# Patient Record
Sex: FEMALE | URBAN - METROPOLITAN AREA
[De-identification: names, ages, dates, MRNs, and addresses within clinical notes are randomized per-mention and may not be internally consistent; named-entity substitution may affect disease eponyms.]

---

## 2019-04-15 ENCOUNTER — PREP FOR SURGERY (OUTPATIENT)
Dept: OTHER | Facility: HOSPITAL | Age: 74
End: 2019-04-15

## 2019-08-07 ENCOUNTER — LAB REQUISITION (OUTPATIENT)
Dept: LAB | Facility: HOSPITAL | Age: 74
End: 2019-08-07

## 2019-08-07 DIAGNOSIS — Z00.00 ROUTINE GENERAL MEDICAL EXAMINATION AT A HEALTH CARE FACILITY: ICD-10-CM

## 2019-08-07 LAB
ALBUMIN SERPL-MCNC: 3.3 G/DL (ref 3.5–5.2)
ALBUMIN/GLOB SERPL: 0.9 G/DL
ALP SERPL-CCNC: 187 U/L (ref 39–117)
ALT SERPL W P-5'-P-CCNC: 13 U/L (ref 1–33)
ANION GAP SERPL CALCULATED.3IONS-SCNC: 15 MMOL/L (ref 5–15)
AST SERPL-CCNC: 20 U/L (ref 1–32)
BACTERIA UR QL AUTO: ABNORMAL /HPF
BASOPHILS # BLD AUTO: 0.07 10*3/MM3 (ref 0–0.2)
BASOPHILS NFR BLD AUTO: 0.2 % (ref 0–1.5)
BILIRUB SERPL-MCNC: 0.2 MG/DL (ref 0.2–1.2)
BILIRUB UR QL STRIP: NEGATIVE
BUN BLD-MCNC: 22 MG/DL (ref 8–23)
BUN/CREAT SERPL: 27.5 (ref 7–25)
CALCIUM SPEC-SCNC: 8.9 MG/DL (ref 8.6–10.5)
CHLORIDE SERPL-SCNC: 94 MMOL/L (ref 98–107)
CLARITY UR: CLEAR
CO2 SERPL-SCNC: 24 MMOL/L (ref 22–29)
COLOR UR: YELLOW
CREAT BLD-MCNC: 0.8 MG/DL (ref 0.57–1)
DEPRECATED RDW RBC AUTO: 47.1 FL (ref 37–54)
EOSINOPHIL # BLD AUTO: 0.06 10*3/MM3 (ref 0–0.4)
EOSINOPHIL NFR BLD AUTO: 0.2 % (ref 0.3–6.2)
ERYTHROCYTE [DISTWIDTH] IN BLOOD BY AUTOMATED COUNT: 15.6 % (ref 12.3–15.4)
GFR SERPL CREATININE-BSD FRML MDRD: 71 ML/MIN/1.73
GFR SERPL CREATININE-BSD FRML MDRD: 85 ML/MIN/1.73
GLOBULIN UR ELPH-MCNC: 3.7 GM/DL
GLUCOSE BLD-MCNC: 92 MG/DL (ref 65–99)
GLUCOSE UR STRIP-MCNC: NEGATIVE MG/DL
HCT VFR BLD AUTO: 33.4 % (ref 34–46.6)
HGB BLD-MCNC: 9.8 G/DL (ref 12–15.9)
HGB UR QL STRIP.AUTO: ABNORMAL
HYALINE CASTS UR QL AUTO: ABNORMAL /LPF
IMM GRANULOCYTES # BLD AUTO: 0.92 10*3/MM3 (ref 0–0.05)
IMM GRANULOCYTES NFR BLD AUTO: 2.5 % (ref 0–0.5)
KETONES UR QL STRIP: NEGATIVE
LEUKOCYTE ESTERASE UR QL STRIP.AUTO: ABNORMAL
LYMPHOCYTES # BLD AUTO: 2.18 10*3/MM3 (ref 0.7–3.1)
LYMPHOCYTES NFR BLD AUTO: 5.8 % (ref 19.6–45.3)
MCH RBC QN AUTO: 24 PG (ref 26.6–33)
MCHC RBC AUTO-ENTMCNC: 29.3 G/DL (ref 31.5–35.7)
MCV RBC AUTO: 81.9 FL (ref 79–97)
MONOCYTES # BLD AUTO: 2.1 10*3/MM3 (ref 0.1–0.9)
MONOCYTES NFR BLD AUTO: 5.6 % (ref 5–12)
NEUTROPHILS # BLD AUTO: 32.22 10*3/MM3 (ref 1.7–7)
NEUTROPHILS NFR BLD AUTO: 85.7 % (ref 42.7–76)
NITRITE UR QL STRIP: NEGATIVE
NRBC BLD AUTO-RTO: 0 /100 WBC (ref 0–0.2)
PH UR STRIP.AUTO: <=5 [PH] (ref 5–8)
PLATELET # BLD AUTO: 633 10*3/MM3 (ref 140–450)
PMV BLD AUTO: 9.1 FL (ref 6–12)
POTASSIUM BLD-SCNC: 3.3 MMOL/L (ref 3.5–5.2)
PROT SERPL-MCNC: 7 G/DL (ref 6–8.5)
PROT UR QL STRIP: ABNORMAL
RBC # BLD AUTO: 4.08 10*6/MM3 (ref 3.77–5.28)
RBC # UR: ABNORMAL /HPF
REF LAB TEST METHOD: ABNORMAL
SODIUM BLD-SCNC: 133 MMOL/L (ref 136–145)
SP GR UR STRIP: 1.02 (ref 1–1.03)
SQUAMOUS #/AREA URNS HPF: ABNORMAL /HPF
UROBILINOGEN UR QL STRIP: ABNORMAL
WBC NRBC COR # BLD: 37.55 10*3/MM3 (ref 3.4–10.8)
WBC UR QL AUTO: ABNORMAL /HPF
YEAST URNS QL MICRO: ABNORMAL /HPF

## 2019-08-07 PROCEDURE — 81001 URINALYSIS AUTO W/SCOPE: CPT

## 2019-08-07 PROCEDURE — 80053 COMPREHEN METABOLIC PANEL: CPT

## 2019-08-07 PROCEDURE — 85025 COMPLETE CBC W/AUTO DIFF WBC: CPT

## 2024-02-02 ENCOUNTER — HOSPITAL ENCOUNTER (OUTPATIENT)
Facility: HOSPITAL | Age: 79
Setting detail: OBSERVATION
Discharge: NURSING, REHAB OR LONG-TERM CARE | End: 2024-02-03
Attending: EMERGENCY MEDICINE | Admitting: INTERNAL MEDICINE
Payer: MEDICARE

## 2024-02-02 ENCOUNTER — APPOINTMENT (OUTPATIENT)
Dept: GENERAL RADIOLOGY | Facility: HOSPITAL | Age: 79
End: 2024-02-02
Payer: MEDICARE

## 2024-02-02 DIAGNOSIS — J44.1 COPD EXACERBATION: Primary | ICD-10-CM

## 2024-02-02 PROBLEM — K21.9 GERD WITHOUT ESOPHAGITIS: Status: ACTIVE | Noted: 2024-02-02

## 2024-02-02 PROBLEM — E83.51 HYPOCALCEMIA: Status: ACTIVE | Noted: 2024-02-02

## 2024-02-02 PROBLEM — E03.9 HYPOTHYROIDISM: Status: ACTIVE | Noted: 2024-02-02

## 2024-02-02 PROBLEM — I10 ESSENTIAL HYPERTENSION: Status: ACTIVE | Noted: 2024-02-02

## 2024-02-02 PROBLEM — J44.9 COPD (CHRONIC OBSTRUCTIVE PULMONARY DISEASE): Status: ACTIVE | Noted: 2024-02-02

## 2024-02-02 PROBLEM — D64.9 ANEMIA: Status: ACTIVE | Noted: 2024-02-02

## 2024-02-02 LAB
ALBUMIN SERPL-MCNC: 3.5 G/DL (ref 3.5–5.2)
ALBUMIN/GLOB SERPL: 1.1 G/DL
ALP SERPL-CCNC: 116 U/L (ref 39–117)
ALT SERPL W P-5'-P-CCNC: 11 U/L (ref 1–33)
ANION GAP SERPL CALCULATED.3IONS-SCNC: 8 MMOL/L (ref 5–15)
AST SERPL-CCNC: 24 U/L (ref 1–32)
BASOPHILS # BLD AUTO: 0.05 10*3/MM3 (ref 0–0.2)
BASOPHILS NFR BLD AUTO: 0.5 % (ref 0–1.5)
BILIRUB SERPL-MCNC: <0.2 MG/DL (ref 0–1.2)
BUN SERPL-MCNC: 19 MG/DL (ref 8–23)
BUN/CREAT SERPL: 26 (ref 7–25)
CALCIUM SPEC-SCNC: 8.5 MG/DL (ref 8.6–10.5)
CHLORIDE SERPL-SCNC: 101 MMOL/L (ref 98–107)
CO2 SERPL-SCNC: 33 MMOL/L (ref 22–29)
CREAT SERPL-MCNC: 0.73 MG/DL (ref 0.57–1)
D-LACTATE SERPL-SCNC: 0.8 MMOL/L (ref 0.5–2)
DEPRECATED RDW RBC AUTO: 47.7 FL (ref 37–54)
EGFRCR SERPLBLD CKD-EPI 2021: 84.8 ML/MIN/1.73
EOSINOPHIL # BLD AUTO: 0.48 10*3/MM3 (ref 0–0.4)
EOSINOPHIL NFR BLD AUTO: 4.5 % (ref 0.3–6.2)
ERYTHROCYTE [DISTWIDTH] IN BLOOD BY AUTOMATED COUNT: 12.9 % (ref 12.3–15.4)
FLUAV SUBTYP SPEC NAA+PROBE: NOT DETECTED
FLUBV RNA ISLT QL NAA+PROBE: NOT DETECTED
GLOBULIN UR ELPH-MCNC: 3.2 GM/DL
GLUCOSE SERPL-MCNC: 81 MG/DL (ref 65–99)
HCT VFR BLD AUTO: 33.6 % (ref 34–46.6)
HGB BLD-MCNC: 10 G/DL (ref 12–15.9)
HOLD SPECIMEN: NORMAL
IMM GRANULOCYTES # BLD AUTO: 0.05 10*3/MM3 (ref 0–0.05)
IMM GRANULOCYTES NFR BLD AUTO: 0.5 % (ref 0–0.5)
LYMPHOCYTES # BLD AUTO: 2.3 10*3/MM3 (ref 0.7–3.1)
LYMPHOCYTES NFR BLD AUTO: 21.6 % (ref 19.6–45.3)
MCH RBC QN AUTO: 29.9 PG (ref 26.6–33)
MCHC RBC AUTO-ENTMCNC: 29.8 G/DL (ref 31.5–35.7)
MCV RBC AUTO: 100.6 FL (ref 79–97)
MONOCYTES # BLD AUTO: 1.07 10*3/MM3 (ref 0.1–0.9)
MONOCYTES NFR BLD AUTO: 10 % (ref 5–12)
NEUTROPHILS NFR BLD AUTO: 6.71 10*3/MM3 (ref 1.7–7)
NEUTROPHILS NFR BLD AUTO: 62.9 % (ref 42.7–76)
NRBC BLD AUTO-RTO: 0 /100 WBC (ref 0–0.2)
NT-PROBNP SERPL-MCNC: 258.7 PG/ML (ref 0–1800)
PLATELET # BLD AUTO: 367 10*3/MM3 (ref 140–450)
PMV BLD AUTO: 9.4 FL (ref 6–12)
POTASSIUM SERPL-SCNC: 4.6 MMOL/L (ref 3.5–5.2)
PROT SERPL-MCNC: 6.7 G/DL (ref 6–8.5)
RBC # BLD AUTO: 3.34 10*6/MM3 (ref 3.77–5.28)
SARS-COV-2 RNA RESP QL NAA+PROBE: NOT DETECTED
SODIUM SERPL-SCNC: 142 MMOL/L (ref 136–145)
TROPONIN T SERPL HS-MCNC: 10 NG/L
WBC NRBC COR # BLD AUTO: 10.66 10*3/MM3 (ref 3.4–10.8)
WHOLE BLOOD HOLD COAG: NORMAL
WHOLE BLOOD HOLD SPECIMEN: NORMAL

## 2024-02-02 PROCEDURE — 94664 DEMO&/EVAL PT USE INHALER: CPT

## 2024-02-02 PROCEDURE — G0378 HOSPITAL OBSERVATION PER HR: HCPCS

## 2024-02-02 PROCEDURE — 93005 ELECTROCARDIOGRAM TRACING: CPT | Performed by: EMERGENCY MEDICINE

## 2024-02-02 PROCEDURE — 85045 AUTOMATED RETICULOCYTE COUNT: CPT | Performed by: PHYSICIAN ASSISTANT

## 2024-02-02 PROCEDURE — 94640 AIRWAY INHALATION TREATMENT: CPT

## 2024-02-02 PROCEDURE — 82607 VITAMIN B-12: CPT | Performed by: PHYSICIAN ASSISTANT

## 2024-02-02 PROCEDURE — 25810000003 SODIUM CHLORIDE 0.9 % SOLUTION 250 ML FLEX CONT

## 2024-02-02 PROCEDURE — 36415 COLL VENOUS BLD VENIPUNCTURE: CPT

## 2024-02-02 PROCEDURE — 85025 COMPLETE CBC W/AUTO DIFF WBC: CPT | Performed by: EMERGENCY MEDICINE

## 2024-02-02 PROCEDURE — 83880 ASSAY OF NATRIURETIC PEPTIDE: CPT | Performed by: EMERGENCY MEDICINE

## 2024-02-02 PROCEDURE — 84439 ASSAY OF FREE THYROXINE: CPT | Performed by: PHYSICIAN ASSISTANT

## 2024-02-02 PROCEDURE — 85025 COMPLETE CBC W/AUTO DIFF WBC: CPT | Performed by: PHYSICIAN ASSISTANT

## 2024-02-02 PROCEDURE — 84466 ASSAY OF TRANSFERRIN: CPT | Performed by: PHYSICIAN ASSISTANT

## 2024-02-02 PROCEDURE — 71045 X-RAY EXAM CHEST 1 VIEW: CPT

## 2024-02-02 PROCEDURE — 25010000002 METHYLPREDNISOLONE PER 125 MG

## 2024-02-02 PROCEDURE — 84443 ASSAY THYROID STIM HORMONE: CPT | Performed by: PHYSICIAN ASSISTANT

## 2024-02-02 PROCEDURE — 84484 ASSAY OF TROPONIN QUANT: CPT | Performed by: EMERGENCY MEDICINE

## 2024-02-02 PROCEDURE — 82330 ASSAY OF CALCIUM: CPT | Performed by: PHYSICIAN ASSISTANT

## 2024-02-02 PROCEDURE — 96374 THER/PROPH/DIAG INJ IV PUSH: CPT

## 2024-02-02 PROCEDURE — 99285 EMERGENCY DEPT VISIT HI MDM: CPT

## 2024-02-02 PROCEDURE — 94799 UNLISTED PULMONARY SVC/PX: CPT

## 2024-02-02 PROCEDURE — 82746 ASSAY OF FOLIC ACID SERUM: CPT | Performed by: PHYSICIAN ASSISTANT

## 2024-02-02 PROCEDURE — 99222 1ST HOSP IP/OBS MODERATE 55: CPT | Performed by: PHYSICIAN ASSISTANT

## 2024-02-02 PROCEDURE — 83605 ASSAY OF LACTIC ACID: CPT

## 2024-02-02 PROCEDURE — 82728 ASSAY OF FERRITIN: CPT | Performed by: PHYSICIAN ASSISTANT

## 2024-02-02 PROCEDURE — 80053 COMPREHEN METABOLIC PANEL: CPT | Performed by: EMERGENCY MEDICINE

## 2024-02-02 PROCEDURE — 83540 ASSAY OF IRON: CPT | Performed by: PHYSICIAN ASSISTANT

## 2024-02-02 PROCEDURE — 93005 ELECTROCARDIOGRAM TRACING: CPT

## 2024-02-02 PROCEDURE — 87636 SARSCOV2 & INF A&B AMP PRB: CPT

## 2024-02-02 PROCEDURE — 87040 BLOOD CULTURE FOR BACTERIA: CPT

## 2024-02-02 RX ORDER — ALBUTEROL SULFATE 90 UG/1
2 AEROSOL, METERED RESPIRATORY (INHALATION) EVERY 6 HOURS PRN
COMMUNITY

## 2024-02-02 RX ORDER — METHYLPREDNISOLONE SODIUM SUCCINATE 125 MG/2ML
125 INJECTION, POWDER, LYOPHILIZED, FOR SOLUTION INTRAMUSCULAR; INTRAVENOUS ONCE
Status: COMPLETED | OUTPATIENT
Start: 2024-02-02 | End: 2024-02-02

## 2024-02-02 RX ORDER — CHOLECALCIFEROL (VITAMIN D3) 125 MCG
5 CAPSULE ORAL NIGHTLY PRN
Status: DISCONTINUED | OUTPATIENT
Start: 2024-02-02 | End: 2024-02-03 | Stop reason: HOSPADM

## 2024-02-02 RX ORDER — LEVOTHYROXINE SODIUM 0.03 MG/1
25 TABLET ORAL
Status: DISCONTINUED | OUTPATIENT
Start: 2024-02-03 | End: 2024-02-03 | Stop reason: HOSPADM

## 2024-02-02 RX ORDER — ONDANSETRON 2 MG/ML
4 INJECTION INTRAMUSCULAR; INTRAVENOUS EVERY 6 HOURS PRN
Status: DISCONTINUED | OUTPATIENT
Start: 2024-02-02 | End: 2024-02-03 | Stop reason: HOSPADM

## 2024-02-02 RX ORDER — PANTOPRAZOLE SODIUM 40 MG/1
40 TABLET, DELAYED RELEASE ORAL DAILY
Status: DISCONTINUED | OUTPATIENT
Start: 2024-02-03 | End: 2024-02-03 | Stop reason: HOSPADM

## 2024-02-02 RX ORDER — ACETAMINOPHEN 325 MG/1
650 TABLET ORAL 2 TIMES DAILY
COMMUNITY

## 2024-02-02 RX ORDER — LANOLIN ALCOHOL/MO/W.PET/CERES
6 CREAM (GRAM) TOPICAL NIGHTLY
COMMUNITY

## 2024-02-02 RX ORDER — BACLOFEN 10 MG/1
10 TABLET ORAL 3 TIMES DAILY PRN
COMMUNITY

## 2024-02-02 RX ORDER — CARVEDILOL 12.5 MG/1
12.5 TABLET ORAL 2 TIMES DAILY WITH MEALS
COMMUNITY

## 2024-02-02 RX ORDER — SODIUM CHLORIDE 0.9 % (FLUSH) 0.9 %
10 SYRINGE (ML) INJECTION AS NEEDED
Status: DISCONTINUED | OUTPATIENT
Start: 2024-02-02 | End: 2024-02-03 | Stop reason: HOSPADM

## 2024-02-02 RX ORDER — ALPRAZOLAM 0.25 MG/1
0.25 TABLET ORAL 2 TIMES DAILY
COMMUNITY

## 2024-02-02 RX ORDER — ONDANSETRON 4 MG/1
4 TABLET, ORALLY DISINTEGRATING ORAL EVERY 6 HOURS PRN
Status: DISCONTINUED | OUTPATIENT
Start: 2024-02-02 | End: 2024-02-03 | Stop reason: HOSPADM

## 2024-02-02 RX ORDER — MIRTAZAPINE 15 MG/1
7.5 TABLET, FILM COATED ORAL NIGHTLY
COMMUNITY

## 2024-02-02 RX ORDER — VITAMIN E 268 MG
400 CAPSULE ORAL DAILY
COMMUNITY

## 2024-02-02 RX ORDER — FLUTICASONE PROPIONATE 50 MCG
2 SPRAY, SUSPENSION (ML) NASAL DAILY
COMMUNITY

## 2024-02-02 RX ORDER — IPRATROPIUM BROMIDE AND ALBUTEROL SULFATE 2.5; .5 MG/3ML; MG/3ML
3 SOLUTION RESPIRATORY (INHALATION)
Status: DISCONTINUED | OUTPATIENT
Start: 2024-02-02 | End: 2024-02-03 | Stop reason: HOSPADM

## 2024-02-02 RX ORDER — MELATONIN
2000 DAILY
COMMUNITY

## 2024-02-02 RX ORDER — SODIUM CHLORIDE 9 MG/ML
75 INJECTION, SOLUTION INTRAVENOUS CONTINUOUS
Status: DISCONTINUED | OUTPATIENT
Start: 2024-02-03 | End: 2024-02-03

## 2024-02-02 RX ORDER — SODIUM CHLORIDE 0.9 % (FLUSH) 0.9 %
10 SYRINGE (ML) INJECTION EVERY 12 HOURS SCHEDULED
Status: DISCONTINUED | OUTPATIENT
Start: 2024-02-03 | End: 2024-02-03 | Stop reason: HOSPADM

## 2024-02-02 RX ORDER — ASPIRIN 81 MG/1
81 TABLET ORAL DAILY
COMMUNITY

## 2024-02-02 RX ORDER — BUSPIRONE HYDROCHLORIDE 5 MG/1
7.5 TABLET ORAL 2 TIMES DAILY
Status: DISCONTINUED | OUTPATIENT
Start: 2024-02-03 | End: 2024-02-03

## 2024-02-02 RX ORDER — PANTOPRAZOLE SODIUM 40 MG/1
40 TABLET, DELAYED RELEASE ORAL DAILY
COMMUNITY

## 2024-02-02 RX ORDER — GABAPENTIN 300 MG/1
300 CAPSULE ORAL 3 TIMES DAILY
COMMUNITY

## 2024-02-02 RX ORDER — MAGNESIUM OXIDE 400 MG/1
400 TABLET ORAL DAILY
COMMUNITY

## 2024-02-02 RX ORDER — SERTRALINE HYDROCHLORIDE 100 MG/1
100 TABLET, FILM COATED ORAL DAILY
Status: DISCONTINUED | OUTPATIENT
Start: 2024-02-03 | End: 2024-02-03 | Stop reason: HOSPADM

## 2024-02-02 RX ORDER — ACETAMINOPHEN 325 MG/1
650 TABLET ORAL EVERY 4 HOURS PRN
Status: DISCONTINUED | OUTPATIENT
Start: 2024-02-02 | End: 2024-02-03 | Stop reason: HOSPADM

## 2024-02-02 RX ORDER — MIRTAZAPINE 15 MG/1
7.5 TABLET, FILM COATED ORAL NIGHTLY
Status: DISCONTINUED | OUTPATIENT
Start: 2024-02-03 | End: 2024-02-03 | Stop reason: HOSPADM

## 2024-02-02 RX ORDER — ASPIRIN 81 MG/1
81 TABLET ORAL DAILY
Status: DISCONTINUED | OUTPATIENT
Start: 2024-02-03 | End: 2024-02-03 | Stop reason: HOSPADM

## 2024-02-02 RX ORDER — IPRATROPIUM BROMIDE AND ALBUTEROL SULFATE 2.5; .5 MG/3ML; MG/3ML
3 SOLUTION RESPIRATORY (INHALATION) 3 TIMES DAILY PRN
COMMUNITY

## 2024-02-02 RX ORDER — TRAMADOL HYDROCHLORIDE 50 MG/1
50 TABLET ORAL EVERY 6 HOURS PRN
COMMUNITY

## 2024-02-02 RX ORDER — BUSPIRONE HYDROCHLORIDE 5 MG/1
7.5 TABLET ORAL 2 TIMES DAILY
Status: ON HOLD | COMMUNITY
End: 2024-02-03 | Stop reason: SDUPTHER

## 2024-02-02 RX ORDER — SODIUM CHLORIDE 9 MG/ML
40 INJECTION, SOLUTION INTRAVENOUS AS NEEDED
Status: DISCONTINUED | OUTPATIENT
Start: 2024-02-02 | End: 2024-02-03 | Stop reason: HOSPADM

## 2024-02-02 RX ORDER — CARVEDILOL 12.5 MG/1
12.5 TABLET ORAL 2 TIMES DAILY WITH MEALS
Status: DISCONTINUED | OUTPATIENT
Start: 2024-02-03 | End: 2024-02-03 | Stop reason: HOSPADM

## 2024-02-02 RX ORDER — FLUTICASONE FUROATE AND VILANTEROL 200; 25 UG/1; UG/1
1 POWDER RESPIRATORY (INHALATION)
COMMUNITY

## 2024-02-02 RX ORDER — LOPERAMIDE HYDROCHLORIDE 2 MG/1
2 CAPSULE ORAL 4 TIMES DAILY PRN
COMMUNITY

## 2024-02-02 RX ORDER — IPRATROPIUM BROMIDE AND ALBUTEROL SULFATE 2.5; .5 MG/3ML; MG/3ML
3 SOLUTION RESPIRATORY (INHALATION) ONCE
Status: COMPLETED | OUTPATIENT
Start: 2024-02-02 | End: 2024-02-02

## 2024-02-02 RX ORDER — SERTRALINE HYDROCHLORIDE 100 MG/1
100 TABLET, FILM COATED ORAL DAILY
COMMUNITY

## 2024-02-02 RX ORDER — ONDANSETRON 4 MG/1
4 TABLET, FILM COATED ORAL 4 TIMES DAILY PRN
COMMUNITY

## 2024-02-02 RX ORDER — DEXTRAN 70, GLYCERIN, HYPROMELLOSE 1; 2; 3 MG/ML; MG/ML; MG/ML
2 SOLUTION/ DROPS OPHTHALMIC 3 TIMES DAILY
COMMUNITY

## 2024-02-02 RX ORDER — LEVOTHYROXINE SODIUM 0.03 MG/1
25 TABLET ORAL
COMMUNITY

## 2024-02-02 RX ADMIN — IPRATROPIUM BROMIDE AND ALBUTEROL SULFATE 3 ML: .5; 2.5 SOLUTION RESPIRATORY (INHALATION) at 21:08

## 2024-02-02 RX ADMIN — METHYLPREDNISOLONE SODIUM SUCCINATE 125 MG: 125 INJECTION INTRAMUSCULAR; INTRAVENOUS at 21:31

## 2024-02-02 RX ADMIN — DOXYCYCLINE 100 MG: 100 INJECTION, POWDER, LYOPHILIZED, FOR SOLUTION INTRAVENOUS at 21:32

## 2024-02-02 NOTE — Clinical Note
Level of Care: Telemetry [5]   Diagnosis: COPD (chronic obstructive pulmonary disease) [649381]   Admitting Physician: PÉREZ GALEANO [[id number]]   Attending Physician: PÉREZ GALEANO [[id number]]

## 2024-02-03 VITALS
BODY MASS INDEX: 18.25 KG/M2 | OXYGEN SATURATION: 94 % | TEMPERATURE: 98.1 F | HEIGHT: 63 IN | SYSTOLIC BLOOD PRESSURE: 136 MMHG | HEART RATE: 93 BPM | WEIGHT: 103 LBS | RESPIRATION RATE: 16 BRPM | DIASTOLIC BLOOD PRESSURE: 87 MMHG

## 2024-02-03 PROBLEM — F41.0 PANIC ATTACK: Status: ACTIVE | Noted: 2024-02-03

## 2024-02-03 PROBLEM — R31.9 HEMATURIA: Status: ACTIVE | Noted: 2024-02-03

## 2024-02-03 LAB
ANION GAP SERPL CALCULATED.3IONS-SCNC: 9 MMOL/L (ref 5–15)
ANION GAP SERPL CALCULATED.3IONS-SCNC: 9 MMOL/L (ref 5–15)
BACTERIA UR QL AUTO: ABNORMAL /HPF
BASOPHILS # BLD AUTO: 0.01 10*3/MM3 (ref 0–0.2)
BASOPHILS # BLD AUTO: 0.03 10*3/MM3 (ref 0–0.2)
BASOPHILS NFR BLD AUTO: 0.1 % (ref 0–1.5)
BASOPHILS NFR BLD AUTO: 0.4 % (ref 0–1.5)
BILIRUB UR QL STRIP: NEGATIVE
BUN SERPL-MCNC: 16 MG/DL (ref 8–23)
BUN SERPL-MCNC: 19 MG/DL (ref 8–23)
BUN/CREAT SERPL: 26.2 (ref 7–25)
BUN/CREAT SERPL: 30.2 (ref 7–25)
CA-I SERPL ISE-MCNC: 1.22 MMOL/L (ref 1.12–1.32)
CALCIUM SPEC-SCNC: 8.6 MG/DL (ref 8.6–10.5)
CALCIUM SPEC-SCNC: 8.9 MG/DL (ref 8.6–10.5)
CHLORIDE SERPL-SCNC: 102 MMOL/L (ref 98–107)
CHLORIDE SERPL-SCNC: 103 MMOL/L (ref 98–107)
CLARITY UR: ABNORMAL
CO2 SERPL-SCNC: 29 MMOL/L (ref 22–29)
CO2 SERPL-SCNC: 30 MMOL/L (ref 22–29)
COLOR UR: YELLOW
CREAT SERPL-MCNC: 0.61 MG/DL (ref 0.57–1)
CREAT SERPL-MCNC: 0.63 MG/DL (ref 0.57–1)
DEPRECATED RDW RBC AUTO: 45.5 FL (ref 37–54)
DEPRECATED RDW RBC AUTO: 46.5 FL (ref 37–54)
EGFRCR SERPLBLD CKD-EPI 2021: 91.5 ML/MIN/1.73
EGFRCR SERPLBLD CKD-EPI 2021: 92.2 ML/MIN/1.73
EOSINOPHIL # BLD AUTO: 0 10*3/MM3 (ref 0–0.4)
EOSINOPHIL # BLD AUTO: 0.18 10*3/MM3 (ref 0–0.4)
EOSINOPHIL NFR BLD AUTO: 0 % (ref 0.3–6.2)
EOSINOPHIL NFR BLD AUTO: 2.2 % (ref 0.3–6.2)
ERYTHROCYTE [DISTWIDTH] IN BLOOD BY AUTOMATED COUNT: 12.9 % (ref 12.3–15.4)
ERYTHROCYTE [DISTWIDTH] IN BLOOD BY AUTOMATED COUNT: 13.1 % (ref 12.3–15.4)
FERRITIN SERPL-MCNC: 35.95 NG/ML (ref 13–150)
FOLATE SERPL-MCNC: 3.52 NG/ML (ref 4.78–24.2)
GLUCOSE SERPL-MCNC: 141 MG/DL (ref 65–99)
GLUCOSE SERPL-MCNC: 93 MG/DL (ref 65–99)
GLUCOSE UR STRIP-MCNC: NEGATIVE MG/DL
HBV SURFACE AG SERPL QL IA: NORMAL
HCT VFR BLD AUTO: 32.4 % (ref 34–46.6)
HCT VFR BLD AUTO: 33.4 % (ref 34–46.6)
HCV AB SER DONR QL: NORMAL
HGB BLD-MCNC: 10.3 G/DL (ref 12–15.9)
HGB BLD-MCNC: 9.9 G/DL (ref 12–15.9)
HGB UR QL STRIP.AUTO: ABNORMAL
HIV 1+2 AB+HIV1 P24 AG SERPL QL IA: NORMAL
HYALINE CASTS UR QL AUTO: ABNORMAL /LPF
IMM GRANULOCYTES # BLD AUTO: 0.02 10*3/MM3 (ref 0–0.05)
IMM GRANULOCYTES # BLD AUTO: 0.06 10*3/MM3 (ref 0–0.05)
IMM GRANULOCYTES NFR BLD AUTO: 0.2 % (ref 0–0.5)
IMM GRANULOCYTES NFR BLD AUTO: 0.7 % (ref 0–0.5)
IRON 24H UR-MRATE: 19 MCG/DL (ref 37–145)
IRON SATN MFR SERPL: 6 % (ref 20–50)
KETONES UR QL STRIP: ABNORMAL
LEUKOCYTE ESTERASE UR QL STRIP.AUTO: ABNORMAL
LYMPHOCYTES # BLD AUTO: 1.27 10*3/MM3 (ref 0.7–3.1)
LYMPHOCYTES # BLD AUTO: 1.63 10*3/MM3 (ref 0.7–3.1)
LYMPHOCYTES NFR BLD AUTO: 15.3 % (ref 19.6–45.3)
LYMPHOCYTES NFR BLD AUTO: 20 % (ref 19.6–45.3)
MCH RBC QN AUTO: 30.1 PG (ref 26.6–33)
MCH RBC QN AUTO: 30.3 PG (ref 26.6–33)
MCHC RBC AUTO-ENTMCNC: 30.6 G/DL (ref 31.5–35.7)
MCHC RBC AUTO-ENTMCNC: 30.8 G/DL (ref 31.5–35.7)
MCV RBC AUTO: 97.7 FL (ref 79–97)
MCV RBC AUTO: 99.1 FL (ref 79–97)
MONOCYTES # BLD AUTO: 0.04 10*3/MM3 (ref 0.1–0.9)
MONOCYTES # BLD AUTO: 0.25 10*3/MM3 (ref 0.1–0.9)
MONOCYTES NFR BLD AUTO: 0.5 % (ref 5–12)
MONOCYTES NFR BLD AUTO: 3.1 % (ref 5–12)
NEUTROPHILS NFR BLD AUTO: 6.06 10*3/MM3 (ref 1.7–7)
NEUTROPHILS NFR BLD AUTO: 6.91 10*3/MM3 (ref 1.7–7)
NEUTROPHILS NFR BLD AUTO: 74.1 % (ref 42.7–76)
NEUTROPHILS NFR BLD AUTO: 83.4 % (ref 42.7–76)
NITRITE UR QL STRIP: NEGATIVE
NRBC BLD AUTO-RTO: 0 /100 WBC (ref 0–0.2)
NRBC BLD AUTO-RTO: 0 /100 WBC (ref 0–0.2)
PH UR STRIP.AUTO: 7 [PH] (ref 5–8)
PLATELET # BLD AUTO: 319 10*3/MM3 (ref 140–450)
PLATELET # BLD AUTO: 332 10*3/MM3 (ref 140–450)
PMV BLD AUTO: 9.3 FL (ref 6–12)
PMV BLD AUTO: 9.4 FL (ref 6–12)
POTASSIUM SERPL-SCNC: 4.2 MMOL/L (ref 3.5–5.2)
POTASSIUM SERPL-SCNC: 4.2 MMOL/L (ref 3.5–5.2)
PROT UR QL STRIP: ABNORMAL
QT INTERVAL: 392 MS
QTC INTERVAL: 435 MS
RBC # BLD AUTO: 3.27 10*6/MM3 (ref 3.77–5.28)
RBC # BLD AUTO: 3.42 10*6/MM3 (ref 3.77–5.28)
RBC # UR STRIP: ABNORMAL /HPF
REF LAB TEST METHOD: ABNORMAL
RETICS # AUTO: 0.03 10*6/MM3 (ref 0.02–0.13)
RETICS/RBC NFR AUTO: 1.02 % (ref 0.7–1.9)
SODIUM SERPL-SCNC: 141 MMOL/L (ref 136–145)
SODIUM SERPL-SCNC: 141 MMOL/L (ref 136–145)
SP GR UR STRIP: 1.01 (ref 1–1.03)
SQUAMOUS #/AREA URNS HPF: ABNORMAL /HPF
T4 FREE SERPL-MCNC: 1.36 NG/DL (ref 0.93–1.7)
TIBC SERPL-MCNC: 308 MCG/DL (ref 298–536)
TRANSFERRIN SERPL-MCNC: 207 MG/DL (ref 200–360)
TSH SERPL DL<=0.05 MIU/L-ACNC: 0.79 UIU/ML (ref 0.27–4.2)
UROBILINOGEN UR QL STRIP: ABNORMAL
VIT B12 BLD-MCNC: 314 PG/ML (ref 211–946)
WBC # UR STRIP: ABNORMAL /HPF
WBC NRBC COR # BLD AUTO: 8.17 10*3/MM3 (ref 3.4–10.8)
WBC NRBC COR # BLD AUTO: 8.29 10*3/MM3 (ref 3.4–10.8)

## 2024-02-03 PROCEDURE — G0378 HOSPITAL OBSERVATION PER HR: HCPCS

## 2024-02-03 PROCEDURE — 63710000001 PREDNISONE PER 1 MG: Performed by: INTERNAL MEDICINE

## 2024-02-03 PROCEDURE — 80048 BASIC METABOLIC PNL TOTAL CA: CPT | Performed by: INTERNAL MEDICINE

## 2024-02-03 PROCEDURE — 94799 UNLISTED PULMONARY SVC/PX: CPT

## 2024-02-03 PROCEDURE — 94664 DEMO&/EVAL PT USE INHALER: CPT

## 2024-02-03 PROCEDURE — 96361 HYDRATE IV INFUSION ADD-ON: CPT

## 2024-02-03 PROCEDURE — 25810000003 SODIUM CHLORIDE 0.9 % SOLUTION: Performed by: PHYSICIAN ASSISTANT

## 2024-02-03 PROCEDURE — 81001 URINALYSIS AUTO W/SCOPE: CPT | Performed by: INTERNAL MEDICINE

## 2024-02-03 PROCEDURE — 94761 N-INVAS EAR/PLS OXIMETRY MLT: CPT

## 2024-02-03 PROCEDURE — 87086 URINE CULTURE/COLONY COUNT: CPT | Performed by: INTERNAL MEDICINE

## 2024-02-03 PROCEDURE — 99239 HOSP IP/OBS DSCHRG MGMT >30: CPT | Performed by: INTERNAL MEDICINE

## 2024-02-03 PROCEDURE — 85025 COMPLETE CBC W/AUTO DIFF WBC: CPT | Performed by: INTERNAL MEDICINE

## 2024-02-03 RX ORDER — BUSPIRONE HYDROCHLORIDE 5 MG/1
10 TABLET ORAL 2 TIMES DAILY
Qty: 60 TABLET | Refills: 0 | Status: SHIPPED | OUTPATIENT
Start: 2024-02-03 | End: 2024-02-03 | Stop reason: SDUPTHER

## 2024-02-03 RX ORDER — BUSPIRONE HYDROCHLORIDE 5 MG/1
10 TABLET ORAL 2 TIMES DAILY
Qty: 60 TABLET | Refills: 0 | Status: SHIPPED | OUTPATIENT
Start: 2024-02-03

## 2024-02-03 RX ORDER — ALPRAZOLAM 0.25 MG/1
0.25 TABLET ORAL 2 TIMES DAILY PRN
Status: DISCONTINUED | OUTPATIENT
Start: 2024-02-03 | End: 2024-02-03 | Stop reason: HOSPADM

## 2024-02-03 RX ORDER — BUSPIRONE HYDROCHLORIDE 10 MG/1
10 TABLET ORAL EVERY 8 HOURS SCHEDULED
Status: DISCONTINUED | OUTPATIENT
Start: 2024-02-03 | End: 2024-02-03 | Stop reason: HOSPADM

## 2024-02-03 RX ORDER — NITROFURANTOIN 25; 75 MG/1; MG/1
100 CAPSULE ORAL 2 TIMES DAILY
Qty: 6 CAPSULE | Refills: 0 | Status: SHIPPED | OUTPATIENT
Start: 2024-02-03 | End: 2024-02-06

## 2024-02-03 RX ORDER — GABAPENTIN 300 MG/1
300 CAPSULE ORAL 3 TIMES DAILY
Status: DISCONTINUED | OUTPATIENT
Start: 2024-02-03 | End: 2024-02-03 | Stop reason: HOSPADM

## 2024-02-03 RX ORDER — PREDNISONE 20 MG/1
40 TABLET ORAL
Status: DISCONTINUED | OUTPATIENT
Start: 2024-02-03 | End: 2024-02-03

## 2024-02-03 RX ORDER — DOXYCYCLINE 100 MG/1
100 CAPSULE ORAL EVERY 12 HOURS SCHEDULED
Qty: 10 CAPSULE | Refills: 0 | Status: DISCONTINUED | OUTPATIENT
Start: 2024-02-03 | End: 2024-02-03

## 2024-02-03 RX ORDER — FERROUS SULFATE 325(65) MG
325 TABLET ORAL
Status: DISCONTINUED | OUTPATIENT
Start: 2024-02-03 | End: 2024-02-03 | Stop reason: HOSPADM

## 2024-02-03 RX ADMIN — IPRATROPIUM BROMIDE AND ALBUTEROL SULFATE 3 ML: 2.5; .5 SOLUTION RESPIRATORY (INHALATION) at 03:38

## 2024-02-03 RX ADMIN — SERTRALINE HYDROCHLORIDE 100 MG: 100 TABLET ORAL at 09:06

## 2024-02-03 RX ADMIN — PANTOPRAZOLE SODIUM 40 MG: 40 TABLET, DELAYED RELEASE ORAL at 09:05

## 2024-02-03 RX ADMIN — FERROUS SULFATE TAB 325 MG (65 MG ELEMENTAL FE) 325 MG: 325 (65 FE) TAB at 09:06

## 2024-02-03 RX ADMIN — MIRTAZAPINE 7.5 MG: 15 TABLET, FILM COATED ORAL at 00:14

## 2024-02-03 RX ADMIN — ALPRAZOLAM 0.25 MG: 0.25 TABLET ORAL at 11:58

## 2024-02-03 RX ADMIN — PREDNISONE 40 MG: 20 TABLET ORAL at 09:05

## 2024-02-03 RX ADMIN — LEVOTHYROXINE SODIUM 25 MCG: 25 TABLET ORAL at 05:22

## 2024-02-03 RX ADMIN — Medication 10 ML: at 00:13

## 2024-02-03 RX ADMIN — IPRATROPIUM BROMIDE AND ALBUTEROL SULFATE 3 ML: 2.5; .5 SOLUTION RESPIRATORY (INHALATION) at 07:13

## 2024-02-03 RX ADMIN — ACETAMINOPHEN 650 MG: 325 TABLET ORAL at 10:31

## 2024-02-03 RX ADMIN — Medication 10 ML: at 09:06

## 2024-02-03 RX ADMIN — CARVEDILOL 12.5 MG: 12.5 TABLET, FILM COATED ORAL at 09:06

## 2024-02-03 RX ADMIN — DOXYCYCLINE 100 MG: 100 CAPSULE ORAL at 09:06

## 2024-02-03 RX ADMIN — SODIUM CHLORIDE 75 ML/HR: 9 INJECTION, SOLUTION INTRAVENOUS at 00:14

## 2024-02-03 RX ADMIN — BUSPIRONE HYDROCHLORIDE 7.5 MG: 5 TABLET ORAL at 00:14

## 2024-02-03 RX ADMIN — IPRATROPIUM BROMIDE AND ALBUTEROL SULFATE 3 ML: 2.5; .5 SOLUTION RESPIRATORY (INHALATION) at 12:30

## 2024-02-03 RX ADMIN — ASPIRIN 81 MG: 81 TABLET, COATED ORAL at 09:05

## 2024-02-03 RX ADMIN — GABAPENTIN 300 MG: 300 CAPSULE ORAL at 09:14

## 2024-02-03 NOTE — ED PROVIDER NOTES
Subjective   History of Present Illness patient is a 78-year-old female who presents via EMS from her living arrangement at a medical shelter facility where she has lived for nearly 10 years, and complained of today becoming increasingly short of breath and needing oxygen but unable to receive that at the facility she lives.  Patient reports she has COPD presumably from growing up in a home with secondhand smoke, smoking herself until her early 30s but has not since then.  Patient reports she has a rescue inhaler which she used last yesterday, and that the facility may have difficulty administering oxygen to patient's when it needed or breathing treatments.    Review of Systems   Constitutional: Negative.    HENT: Negative.     Respiratory:  Positive for shortness of breath.    Cardiovascular:  Negative for chest pain.   Gastrointestinal: Negative.    Genitourinary: Negative.    Musculoskeletal: Negative.    Skin: Negative.    Neurological: Negative.    Psychiatric/Behavioral:  The patient is nervous/anxious.        No past medical history on file.    No Known Allergies    No past surgical history on file.    No family history on file.    Social History     Socioeconomic History    Marital status: Unknown           Objective   Physical Exam  Constitutional:       Appearance: She is well-developed. She is ill-appearing.   HENT:      Head: Normocephalic and atraumatic.   Eyes:      Extraocular Movements: Extraocular movements intact.      Pupils: Pupils are equal, round, and reactive to light.   Cardiovascular:      Rate and Rhythm: Normal rate.   Pulmonary:      Effort: Tachypnea present.      Breath sounds: Examination of the left-lower field reveals decreased breath sounds.   Chest:      Chest wall: No mass, deformity or tenderness.   Musculoskeletal:         General: Normal range of motion.      Cervical back: Normal range of motion.   Skin:     General: Skin is warm.      Capillary Refill: Capillary refill takes  less than 2 seconds.      Findings: Erythema present.      Comments: She has an erythematous area overlying her fourth distal toe, with a little bit of dried blood which she attributes to uncomfortable shoes and has a similar pattern on her left fourth toe.   Neurological:      General: No focal deficit present.      Mental Status: She is alert.   Psychiatric:         Mood and Affect: Mood is anxious.         Procedures           ED Course  ED Course as of 02/02/24 2128 Fri Feb 02, 2024 2007 Patient initially evaluated ED room 2. []   2128 Hospitalist accepted patient for admission. []      ED Course User Index  [] Yusuf Kent APRN                                             Medical Decision Making  Given the patient's presenting symptoms and reported history, differential diagnosis including abdomen include COPD exacerbation, cannot exclude pneumonia, acute coronary syndrome, congestive heart failure exacerbation.  Patient was serum screening labs, plain film imaging of her chest, twelve-lead EKG.  Results to be communicated disposition considered.    Problems Addressed:  COPD exacerbation: complicated acute illness or injury    Amount and/or Complexity of Data Reviewed  Labs: ordered.  Radiology: ordered.  ECG/medicine tests: ordered.    Risk  Prescription drug management.  Decision regarding hospitalization.        Final diagnoses:   COPD exacerbation       ED Disposition  ED Disposition       ED Disposition   Decision to Admit    Condition   --    Comment   Level of Care: Telemetry [5]   Diagnosis: COPD (chronic obstructive pulmonary disease) [824257]   Admitting Physician: PÉREZ GALEANO [[id number]]   Attending Physician: PÉREZ GALEANO [[id number]]                 No follow-up provider specified.       Medication List      No changes were made to your prescriptions during this visit.            Yusuf Kent APRN  02/02/24 [id number]

## 2024-02-03 NOTE — CASE MANAGEMENT/SOCIAL WORK
Case Management Discharge Note      Final Note: I spoke with the daughter of this patient regarding her being discharged back to the [facility] today. She was in agreement with this plan. [facility] has been scheduled to transport her today at 1300. Please escribe pertinent medications to [facility] in [facility]. CM will fax the D/C Summary when it becomes available. RN to please call report to . The daughter denies having any further discharge planning needs at this time. MD and RN aware of the discharge.  [facility] called, and they will be about 30-45 minutes later for transport. RN notified.         Selected Continued Care - Admitted Since 2/2/2024       Destination Coordination complete.      Service Provider Selected Services Address Phone Fax Patient Preferred    [facility] [place] [address] [phone] --              Durable Medical Equipment    No services have been selected for the patient.                Dialysis/Infusion    No services have been selected for the patient.                Home Medical Care    No services have been selected for the patient.                Therapy    No services have been selected for the patient.                Community Resources    No services have been selected for the patient.                Community & DME    No services have been selected for the patient.                         Final Discharge Disposition Code: 04 - intermediate care facility

## 2024-02-03 NOTE — H&P
[facility] Medicine Services  HISTORY AND PHYSICAL    Patient Name: Beckie Collazo  : 1945  MRN: [id number]  Primary Care Physician: Provider, No Known  Date of admission: 2024    Subjective   Subjective     Chief Complaint:  SOB    HPI:  Beckie Collazo is a 78 y.o. female with a history of COPD (on 2L NC chronically), HTN, anxiety/depression, GERD, and hypothyroidism who presents to [facility] ED from [facility] for complaint of worsening shortness of breath. She states that she has been short of breath at baseline for a very long time, however over the last few days it seems to be more severe. She denies fever, chills, chest pain, cough, nausea or vomiting.         Review of Systems   Constitutional:  Positive for fatigue. Negative for chills, fever and unexpected weight change.   HENT:  Negative for nosebleeds, sore throat and trouble swallowing.    Eyes:  Negative for photophobia and visual disturbance.   Respiratory:  Positive for shortness of breath. Negative for cough and wheezing.    Cardiovascular:  Negative for chest pain and palpitations.   Gastrointestinal:  Negative for abdominal pain, diarrhea, nausea and vomiting.   Genitourinary:  Negative for dysuria and hematuria.   Musculoskeletal:  Negative for arthralgias and myalgias.   Skin: Negative.    Neurological:  Positive for weakness (Generalized). Negative for tremors, syncope and speech difficulty.   Psychiatric/Behavioral:  Negative for confusion. The patient is not nervous/anxious.               Personal History     Past Medical History:   Diagnosis Date    Age-related physical debility     Allergic rhinitis     Anemia     Atherosclerotic heart disease of native coronary artery without angina pectoris     Cognitive communication deficit     Contact with and (suspected) exposure to covid-19     COPD (chronic obstructive pulmonary disease)     Cough     Depression     Diarrhea, unspecified     Disease  of thyroid gland     Flatulence     GERD (gastroesophageal reflux disease)     Hyperlipidemia     Hypertension     Insomnia     Major depressive disorder with single episode     Nausea with vomiting     Other specified anxiety disorders     Paroxysmal A-fib     Polyneuropathy     Sciatica of left side     Sciatica of right side     Unspecified protein-calorie malnutrition              History reviewed. No pertinent surgical history.    Family History:  family history includes No Known Problems in her father and mother.     Social History:  reports that she has quit smoking. Her smoking use included cigarettes. She has never used smokeless tobacco. She reports that she does not drink alcohol and does not use drugs.  Social History     Social History Narrative    Not on file       Medications:  ALPRAZolam, Diclofenac Sodium, Fluticasone Furoate-Vilanterol, GenTeal Tears, Loratadine, Potassium & Sodium Phosphates, acetaminophen, albuterol sulfate HFA, aspirin, baclofen, busPIRone, carvedilol, cholecalciferol, fluticasone, gabapentin, guaiFENesin, ipratropium-albuterol, levothyroxine, loperamide, magnesium oxide, melatonin, mirtazapine, ondansetron, pantoprazole, sertraline, traMADol, and vitamin E    Allergies   Allergen Reactions    Codeine Unknown - Low Severity    Zanaflex [Tizanidine Hcl] Other (See Comments)     Low blood pressure       Objective   Objective     Vital Signs:   Temp:  [98 °F (36.7 °C)] 98 °F (36.7 °C)  Heart Rate:  [73-92] 92  Resp:  [16-22] 16  BP: (123-153)/(53-89) 123/75  Flow (L/min):  [2] 2    Physical Exam  Constitutional:       General: She is not in acute distress.     Appearance: Normal appearance.   HENT:      Head: Atraumatic.      Right Ear: External ear normal.      Left Ear: External ear normal.      Nose: Nose normal.   Eyes:      Extraocular Movements: Extraocular movements intact.      Conjunctiva/sclera: Conjunctivae normal.      Pupils: Pupils are equal, round, and reactive to  light.   Cardiovascular:      Rate and Rhythm: Normal rate and regular rhythm.      Pulses: Normal pulses.      Heart sounds: Normal heart sounds. No murmur heard.  Pulmonary:      Effort: Pulmonary effort is normal. No respiratory distress.      Breath sounds: Normal breath sounds. Wheezing (Faint wheeze heard bilaterally.) present. No rhonchi or rales.   Abdominal:      General: Bowel sounds are normal. There is no distension.      Tenderness: There is no abdominal tenderness. There is no guarding or rebound.   Musculoskeletal:         General: Normal range of motion.      Cervical back: No rigidity.      Right lower leg: No edema.      Left lower leg: No edema.   Skin:     General: Skin is warm and dry.      Coloration: Skin is not jaundiced.      Findings: No lesion or rash.   Neurological:      General: No focal deficit present.      Mental Status: She is alert.      Comments: Alert. Oriented to self and location. Confused on date and birthday.   Psychiatric:         Attention and Perception: Attention normal.         Mood and Affect: Mood normal.         Behavior: Behavior normal.         Thought Content: Thought content normal.          Result Review:  I have personally reviewed the results from the time of this admission to 2/2/2024 23:12 EST and agree with these findings:  [x]  Laboratory list / accordion  []  Microbiology  [x]  Radiology  [x]  EKG/Telemetry   []  Cardiology/Vascular   []  Pathology  [x]  Old records  []  Other:      LAB RESULTS:      Lab 02/02/24 1938   WBC 10.66   HEMOGLOBIN 10.0*   HEMATOCRIT 33.6*   PLATELETS 367   NEUTROS ABS 6.71   IMMATURE GRANS (ABS) 0.05   LYMPHS ABS 2.30   MONOS ABS 1.07*   EOS ABS 0.48*   .6*   LACTATE 0.8         Lab 02/02/24 1938   SODIUM 142   POTASSIUM 4.6   CHLORIDE 101   CO2 33.0*   ANION GAP 8.0   BUN 19   CREATININE 0.73   EGFR 84.8   GLUCOSE 81   CALCIUM 8.5*         Lab 02/02/24 1938   TOTAL PROTEIN 6.7   ALBUMIN 3.5   GLOBULIN 3.2   ALT  (SGPT) 11   AST (SGOT) 24   BILIRUBIN <0.2   ALK PHOS 116         Lab 02/02/24  1938   PROBNP 258.7   HSTROP T 10                 Brief Urine Lab Results       None          Microbiology Results (last 10 days)       Procedure Component Value - Date/Time    COVID-19 and FLU A/B PCR, 1 HR TAT - Swab, Nasopharynx [[id number]]  (Normal) Collected: 02/02/24 2000    Lab Status: Final result Specimen: Swab from Nasopharynx Updated: 02/02/24 2033     COVID19 Not Detected     Influenza A PCR Not Detected     Influenza B PCR Not Detected    Narrative:      Fact sheet for providers: https://www.fda.gov/media/619967/download    Fact sheet for patients: https://www.fda.gov/media/435161/download    Test performed by PCR.            XR Chest 1 View    Result Date: 2/2/2024  XR CHEST 1 VW Date of Exam: 2/2/2024 7:11 PM EST Indication: SOA triage protocol Comparison: None available. Findings: Chronic emphysematous changes are apparent, with some asymmetric opacities noted in the left perihilar region concerning for underlying airspace disease. Suspect trace left pleural effusion. There is no distinct pneumothorax. Unremarkable heart and mediastinal contours.     Impression: Impression: Opacities are present on the left which could represent acute airspace disease or some chronic interstitial process. Electronically Signed: Brandt Webster MD  2/2/2024 7:42 PM EST  Workstation ID: [id number]         Assessment & Plan   Assessment & Plan       COPD (chronic obstructive pulmonary disease)    Essential hypertension    Hypocalcemia    Anemia    Hypothyroidism    GERD without esophagitis      Beckie Collazo is a 78 y.o. female with a history of COPD (on 2L NC chronically), HTN, anxiety/depression, GERD, and hypothyroidism who presents to [facility] ED from [facility] for complaint of worsening shortness of breath.     COPD w/ acute exacerbation  Chronic Respiratory Failure w/ Hypoxia  -Currently on 2L NC (her baseline).   -CXR shows  opacities are present on the left which could represent acute airspace disease or some chronic interstitial process.  -Resp panel PCR pending  -Duo Nebs q4  -Received dose of Solumedrol in the ED, which seems to have provided some relief.   -Empiric Doxycycline for now.   -Incentive spirometer    HTN  -Takes Coreg. Continue    Hypothyroidism  -Check TSH, T4  -Continue home synthroid    Hypocalcemia  -Ca2+ 8.5  -Check ionized calcium  -Repeat bmp in the am    Anemia  -Hgb 10.0, Hct 33.6  -Check B12, Folate, Ferratin, Retic count, and iron studies  -Repeat cbc in the am        DVT prophylaxis:  SCDS    CODE STATUS:  Full Code per patient.        Expected Discharge 1-2 days       This note has been completed as part of a split-shared workflow.     Signature: Electronically signed by Anne Ace PA-C, 02/02/24, 11:17 PM EST

## 2024-02-03 NOTE — ED NOTES
Beckie Collazo    Nursing Report ED to Floor:  Mental status: A/Ox3  Ambulatory status: Assist x2  Oxygen Therapy:  2L NC   Cardiac Rhythm: NSR  Admitted from: ED- [facility]   Safety Concerns:  Fall risk   Social Issues: NA  ED Room #:  2    ED Nurse Phone Extension - [phone] or may call [phone].      HPI:   Chief Complaint   Patient presents with    Shortness of Breath       Past Medical History:  No past medical history on file.     Past Surgical History:  No past surgical history on file.     Admitting Doctor:   Otoniel Modi MD    Consulting Provider(s):  Consults       No orders found from 1/4/2024 to 2/3/2024.             Admitting Diagnosis:   The encounter diagnosis was COPD exacerbation.    Most Recent Vitals:   Vitals:    02/02/24 2028 02/02/24 2101 02/02/24 2108 02/02/24 2132   BP: 153/89 133/62  123/75   BP Location:       Patient Position:       Pulse: 86 75 86 92   Resp:   16    Temp:       TempSrc:       SpO2: 99% 99% 96% 94%   Weight:       Height:           Active LDAs/IV Access:   Lines, Drains & Airways       Active LDAs       Name Placement date Placement time Site Days    Peripheral IV 02/02/24 Anterior;Left Forearm 02/02/24  --  Forearm  less than 1                    Labs (abnormal labs have a star):   Labs Reviewed   COMPREHENSIVE METABOLIC PANEL - Abnormal; Notable for the following components:       Result Value    CO2 33.0 (*)     Calcium 8.5 (*)     BUN/Creatinine Ratio 26.0 (*)     All other components within normal limits    Narrative:     GFR Normal >60  Chronic Kidney Disease <60  Kidney Failure <15    The GFR formula is only valid for adults with stable renal function between ages 18 and 70.   CBC WITH AUTO DIFFERENTIAL - Abnormal; Notable for the following components:    RBC 3.34 (*)     Hemoglobin 10.0 (*)     Hematocrit 33.6 (*)     .6 (*)     MCHC 29.8 (*)     Monocytes, Absolute 1.07 (*)     Eosinophils, Absolute 0.48 (*)     All other components within normal limits    COVID-19 AND FLU A/B, NP SWAB IN TRANSPORT MEDIA 1 HR TAT - Normal    Narrative:     Fact sheet for providers: https://www.fda.gov/media/825499/download    Fact sheet for patients: https://www.fda.gov/media/712943/download    Test performed by PCR.   BNP (IN-HOUSE) - Normal    Narrative:     This assay is used as an aid in the diagnosis of individuals suspected of having heart failure. It can be used as an aid in the diagnosis of acute decompensated heart failure (ADHF) in patients presenting with signs and symptoms of ADHF to the emergency department (ED). In addition, NT-proBNP of <300 pg/mL indicates ADHF is not likely.    Age Range Result Interpretation  NT-proBNP Concentration (pg/mL:      <50             Positive            >450                   Gray                 300-450                    Negative             <300    50-75           Positive            >900                  Gray                300-900                  Negative            <300      >75             Positive            >1800                  Gray                300-1800                  Negative            <300   SINGLE HSTROPONIN T - Normal    Narrative:     High Sensitive Troponin T Reference Range:  <14.0 ng/L- Negative Female for AMI  <22.0 ng/L- Negative Male for AMI  >=14 - Abnormal Female indicating possible myocardial injury.  >=22 - Abnormal Male indicating possible myocardial injury.   Clinicians would have to utilize clinical acumen, EKG, Troponin, and serial changes to determine if it is an Acute Myocardial Infarction or myocardial injury due to an underlying chronic condition.        LACTIC ACID, PLASMA - Normal   BLOOD CULTURE   BLOOD CULTURE   RAINBOW DRAW    Narrative:     The following orders were created for panel order [place] Draw.  Procedure                               Abnormality         Status                     ---------                               -----------         ------                     [facility]  (Gel)[[id number]]                                  Final result               Lavender Top[[id number]]                                     Final result               Gold Top - SST[[id number]]                                   Final result               Gray Top[[id number]]                                         In process                 Light Blue Top[[id number]]                                   Final result                 Please view results for these tests on the individual orders.   CBC AND DIFFERENTIAL    Narrative:     The following orders were created for panel order CBC & Differential.  Procedure                               Abnormality         Status                     ---------                               -----------         ------                     CBC Auto Differential[[id number]]        Abnormal            Final result                 Please view results for these tests on the individual orders.   GREEN TOP   LAVENDER TOP   GOLD TOP - SST   LIGHT BLUE TOP   GRAY TOP       Meds Given in ED:   Medications   sodium chloride 0.9 % flush 10 mL (has no administration in time range)   doxycycline (VIBRAMYCIN) 100 mg in sodium chloride 0.9 % 100 mL IVPB (100 mg Intravenous New Bag 2/2/24 2132)   methylPREDNISolone sodium succinate (SOLU-Medrol) injection 125 mg (125 mg Intravenous Given 2/2/24 2131)   ipratropium-albuterol (DUO-NEB) nebulizer solution 3 mL (3 mL Nebulization Given 2/2/24 2108)

## 2024-02-03 NOTE — NURSING NOTE
I contacted the [facility] at [facility] to give report on the patient. I gave a full report to Zaira, a nurse at the facility. Patient is off the floor and in route to the [facility].

## 2024-02-03 NOTE — DISCHARGE PLACEMENT REQUEST
"Beckie Johnson (78 y.o. Female)   [facility]  [facility]       Date of Birth   1945    Social Security Number       Address   [address] [facility] [address]    Home Phone   [phone]    MRN   [id number]       Yarsani   None    Marital Status   Unknown                            Admission Date   2/2/24    Admission Type   Emergency    Admitting Provider   Florence Davis MD    Attending Provider   Florence Davis MD    Department, Room/Bed   [address], S524/1       Discharge Date       Discharge Disposition   Long Term Care (DC - External)    Discharge Destination                                 Attending Provider: Florence Davis MD    Allergies: Codeine, Zanaflex [Tizanidine Hcl]    Isolation: None   Infection: COVID (rule out) (02/02/24)   Code Status: CPR    Ht: 160 cm (63\")   Wt: 46.7 kg (103 lb)    Admission Cmt: None   Principal Problem: Panic attack [F41.0]                   Active Insurance as of 2/2/2024       Primary Coverage       Payor Plan Insurance Group Employer/Plan Group    MEDICARE MEDICARE A & B        Payor Plan Address Payor Plan Phone Number Payor Plan Fax Number Effective Dates    [address] [phone]  11/1/2012 - None Entered    [facility] [id number]         Subscriber Name Subscriber Birth Date Member ID       BECKIE JOHNSON 1945 [id number]               Secondary Coverage       Payor Plan Insurance Group Employer/Plan Group    [place]        Payor Plan Address Payor Plan Phone Number Payor Plan Fax Number Effective Dates    [address] [phone]  1/1/2024 - None Entered    [place] [address]         Subscriber Name Subscriber Birth Date Member ID       BECKIE JOHNSON 1945 [id number]                     Emergency Contacts        (Rel.) Home Phone Work Phone Mobile Phone    Jessica Marlow (Daughter) -- -- [phone]              Insurance Information                  " MEDICARE/MEDICARE A & B Phone: [phone]    Subscriber: Beckie Collazo Subscriber#: [id number]    Group#: -- Precert#: --        [place] Phone: [phone]    Subscriber: Beckie Collazo Subscriber#: [id number]    Group#: -- Precert#: --             Discharge Summary        Florence Davis MD at 24 0908              [facility]  DISCHARGE SUMMARY    Patient Name: Beckie Collazo  : 1945  MRN: [id number]    Date of Admission: 2024  7:03 PM  Date of Discharge:  2/3  Primary Care Physician: Provider, No Known    Consults       No orders found for last 30 day(s).            Hospital Course     Presenting Problem: panic attack    Active Hospital Problems    Diagnosis  POA    **Panic attack [F41.0]  Yes    Hematuria [R31.9]  Yes    COPD (chronic obstructive pulmonary disease) [J44.9]  Yes    Hypocalcemia [E83.51]  Yes    Anemia [D64.9]  Yes    Hypothyroidism [E03.9]  Yes    Essential hypertension [I10]  Yes    GERD without esophagitis [K21.9]  Yes      Resolved Hospital Problems   No resolved problems to display.          Hospital Course:  Beckie Collazo is a 78 y.o. female w COPD on 2 liters n/c, uncontrolled anxiety who presented from the [place] with worsening shortness of breath and symptoms such as a panic attack. Neighbor called 911 before reported to staff in [facility] and presented to our ER.    Patient reports this was c/w prior panic attacks with uncontrolled anxiety. D/w [place] and patient being seen regularly by psychiatry there with titration of medications. Seen as recently as 3 weeks ago.     No cough, no increased dyspnea now on 2 liters n/c outside of during panic attack as above so will stop COPD exacerbation treatments given this    D/w patient, daughter and [place] - will return back today. Will increase buspar to 10 mg BID and then let further follow up with OP psychiatrist.    Gross hematuria  reported to nursing after my exam - u/a performed, will f/u culture but treat empirically w macrobid for now despite minimal symptoms. Recommend follow-up u/a outpatient w evaluation of hematuria if persistent    Discharge Follow Up Recommendations for outpatient labs/diagnostics:   F/u psychiatrist as already scheduled   F/u PCP 1-2 weeks with repeat u/a, +RBCs    Day of Discharge     HPI:   Patient feeling fine, no issues with breathing  Complaints about her living situation but denies abuse or feeling unsafe    Vital Signs:   Temp:  [97.7 °F (36.5 °C)-98 °F (36.7 °C)] 97.9 °F (36.6 °C)  Heart Rate:  [73-96] 78  Resp:  [16-22] 16  BP: (123-159)/(53-89) 159/70  Flow (L/min):  [1-2] 1      Physical Exam:  Constitutional: No acute distress, awake, alert frail older female sitting in bed  HENT: NCAT, mucous membranes moist, poor dentition  Respiratory: Clear to auscultation bilaterally, respiratory effort normal normal sats on 2 liters n/c (home amount)  Cardiovascular: RRR, no murmurs, rubs, or gallops  Gastrointestinal: Soft, nontender, nondistended  Musculoskeletal: Muscle tone within normal limits, no joint effusions appreciated  Psychiatric: Anxious affect, cooperative. Denies SI  Neurologic: Alert and oriented, facial movements symmetric and spontaneous movement of all 4 extremities grossly equal bilaterally, speech clear  Skin: No rashes    Pertinent  and/or Most Recent Results     LAB RESULTS:      Lab 02/03/24  0742 02/02/24 2358 02/02/24 1938   WBC 8.29 8.17 10.66   HEMOGLOBIN 10.3* 9.9* 10.0*   HEMATOCRIT 33.4* 32.4* 33.6*   PLATELETS 332 319 367   NEUTROS ABS 6.91 6.06 6.71   IMMATURE GRANS (ABS) 0.06* 0.02 0.05   LYMPHS ABS 1.27 1.63 2.30   MONOS ABS 0.04* 0.25 1.07*   EOS ABS 0.00 0.18 0.48*   MCV 97.7* 99.1* 100.6*   LACTATE  --   --  0.8         Lab 02/03/24  0742 02/02/24 2358 02/02/24 1938   SODIUM 141 141 142   POTASSIUM 4.2 4.2 4.6   CHLORIDE 103 102 101   CO2 29.0 30.0* 33.0*   ANION GAP 9.0  9.0 8.0   BUN 16 19 19   CREATININE 0.61 0.63 0.73   EGFR 92.2 91.5 84.8   GLUCOSE 141* 93 81   CALCIUM 8.9 8.6 8.5*   IONIZED CALCIUM  --  1.22  --    TSH  --  0.789  --          Lab 02/02/24 1938   TOTAL PROTEIN 6.7   ALBUMIN 3.5   GLOBULIN 3.2   ALT (SGPT) 11   AST (SGOT) 24   BILIRUBIN <0.2   ALK PHOS 116         Lab 02/02/24  1938   PROBNP 258.7   HSTROP T 10             Lab 02/02/24  2358   IRON 19*   IRON SATURATION (TSAT) 6*   TIBC 308   TRANSFERRIN 207   FERRITIN 35.95         Brief Urine Lab Results  (Last result in the past 365 days)        Color   Clarity   Blood   Leuk Est   Nitrite   Protein   CREAT   Urine HCG        02/03/24 1031 Yellow   Cloudy   Large (3+)   Large (3+)   Negative   30 mg/dL (1+)                 Microbiology Results (last 10 days)       Procedure Component Value - Date/Time    COVID-19 and FLU A/B PCR, 1 HR TAT - Swab, Nasopharynx [[id number]]  (Normal) Collected: 02/02/24 2000    Lab Status: Final result Specimen: Swab from Nasopharynx Updated: 02/02/24 2033     COVID19 Not Detected     Influenza A PCR Not Detected     Influenza B PCR Not Detected    Narrative:      Fact sheet for providers: https://www.fda.gov/media/328973/download    Fact sheet for patients: https://www.fda.gov/media/499977/download    Test performed by PCR.            XR Chest 1 View    Result Date: 2/2/2024  XR CHEST 1 VW Date of Exam: 2/2/2024 7:11 PM EST Indication: SOA triage protocol Comparison: None available. Findings: Chronic emphysematous changes are apparent, with some asymmetric opacities noted in the left perihilar region concerning for underlying airspace disease. Suspect trace left pleural effusion. There is no distinct pneumothorax. Unremarkable heart and mediastinal contours.     Impression: Opacities are present on the left which could represent acute airspace disease or some chronic interstitial process. Electronically Signed: Brandt Webster MD  2/2/2024 7:42 PM EST  Workstation ID: [id number]                  Plan for Follow-up of Pending Labs/Results: ngtd  Pending Labs       Order Current Status    Blood Culture - Blood, Arm, Right In process    Blood Culture - Blood, Wrist, Right In process    Folate In process    Urine Culture - Urine, Straight Cath In process    Vitamin B12 In process          Discharge Details        Discharge Medications        New Medications        Instructions Start Date   nitrofurantoin (macrocrystal-monohydrate) 100 MG capsule  Commonly known as: Macrobid   100 mg, Oral, 2 Times Daily             Changes to Medications        Instructions Start Date   busPIRone 5 MG tablet  Commonly known as: BUSPAR  What changed: how much to take   10 mg, Oral, 2 Times Daily             Continue These Medications        Instructions Start Date   acetaminophen 325 MG tablet  Commonly known as: TYLENOL   650 mg, Oral, 2 Times Daily      albuterol sulfate  (90 Base) MCG/ACT inhaler  Commonly known as: PROVENTIL HFA;VENTOLIN HFA;PROAIR HFA   2 puffs, Inhalation, Every 6 Hours PRN      ALPRAZolam 0.25 MG tablet  Commonly known as: XANAX   0.25 mg, Oral, 2 Times Daily      aspirin 81 MG EC tablet   81 mg, Oral, Daily      baclofen 10 MG tablet  Commonly known as: LIORESAL   10 mg, Oral, 3 Times Daily PRN      Breo Ellipta 200-25 MCG/ACT inhaler  Generic drug: Fluticasone Furoate-Vilanterol   1 puff, Inhalation, Daily - RT      carvedilol 12.5 MG tablet  Commonly known as: COREG   12.5 mg, Oral, 2 Times Daily With Meals, Hold if BP less than 110/60 or HR <60/      cholecalciferol 25 MCG (1000 UT) tablet  Commonly known as: VITAMIN D3   2,000 Units, Oral, Daily      CLARITIN PO   10 mg, Oral, Nightly      Diclofenac Sodium 1 % gel gel  Commonly known as: VOLTAREN   2 g, Topical, 4 Times Daily PRN      fluticasone 50 MCG/ACT nasal spray  Commonly known as: FLONASE   2 sprays, Nasal, Daily      gabapentin 300 MG capsule  Commonly known as: NEURONTIN   300 mg, Oral, 3 Times Daily      GenTeal  Tears 0.1-0.2-0.3 % solution   2 drops, Ophthalmic, 3 Times Daily, 1-2 drops in bilateral eyes      GUAIFENESIN ER PO   600 mg, Oral, 2 Times Daily      ipratropium-albuterol 0.5-2.5 mg/3 ml nebulizer  Commonly known as: DUO-NEB   3 mL, Nebulization, 3 Times Daily PRN      levothyroxine 25 MCG tablet  Commonly known as: SYNTHROID, LEVOTHROID   25 mcg, Oral, Every Early Morning      loperamide 2 MG capsule  Commonly known as: IMODIUM   2 mg, Oral, 4 Times Daily PRN, Give 2 tabs after 1st loose stool; give 1 tablet for any loose stools after. Max dose 4 tablets within a 24 hr period.      magnesium oxide 400 MG tablet  Commonly known as: MAG-OX   400 mg, Oral, Daily      melatonin 3 MG tablet   6 mg, Oral, Nightly      mirtazapine 15 MG tablet  Commonly known as: REMERON   7.5 mg, Oral, Nightly      ondansetron 4 MG tablet  Commonly known as: ZOFRAN   4 mg, Oral, 4 Times Daily PRN      pantoprazole 40 MG EC tablet  Commonly known as: PROTONIX   40 mg, Oral, Daily      POTASSIUM & SODIUM PHOSPHATES PO   250 mg, Oral, Daily, 250mg - 45 mg - 298 mg tablet      sertraline 100 MG tablet  Commonly known as: ZOLOFT   100 mg, Oral, Daily      traMADol 50 MG tablet  Commonly known as: ULTRAM   50 mg, Oral, Every 6 Hours PRN      VITAMIN E 400 UNIT capsule  Generic drug: vitamin E   400 Units, Oral, Daily               Allergies   Allergen Reactions    Codeine Unknown - Low Severity    Zanaflex [Tizanidine Hcl] Other (See Comments)     Low blood pressure         Discharge Disposition: back to [facility]  Long [place] Care ([place] - External)    Diet:  Hospital:  Diet Order   Procedures    Diet: Cardiac Diets; Healthy Heart (2-3 Na+); Texture: Regular Texture (IDDSI 7); Fluid Consistency: Thin (IDDSI 0)            Activity:  prior level    Restrictions or Other Recommendations:  none       CODE STATUS:    Code Status and Medical Interventions:   Ordered at: 02/02/24 7080     Code Status (Patient has no pulse  and is not breathing):    CPR (Attempt to Resuscitate)     Medical Interventions (Patient has pulse or is breathing):    Full Support       No future appointments.    Additional Instructions for the Follow-ups that You Need to Schedule       Discharge Follow-up with Specified Provider: OP psychiatrist as planned   As directed      To: OP psychiatrist as planned                Florence Davis MD  02/03/24      Time Spent on Discharge:  I spent 40 minutes on this discharge activity which included: face-to-face encounter with the patient, reviewing the data in the system, coordination of the care with the nursing staff as well as consultants, documentation, and entering orders.            Electronically signed by Florence Davis MD at 02/03/24 1111

## 2024-02-03 NOTE — DISCHARGE SUMMARY
[facility]  DISCHARGE SUMMARY    Patient Name: Beckie Collazo  : 1945  MRN: [id number]    Date of Admission: 2024  7:03 PM  Date of Discharge:  2/3  Primary Care Physician: Provider, No Known    Consults       No orders found for last 30 day(s).            Hospital Course     Presenting Problem: panic attack    Active Hospital Problems    Diagnosis  POA    **Panic attack [F41.0]  Yes    Hematuria [R31.9]  Yes    COPD (chronic obstructive pulmonary disease) [J44.9]  Yes    Hypocalcemia [E83.51]  Yes    Anemia [D64.9]  Yes    Hypothyroidism [E03.9]  Yes    Essential hypertension [I10]  Yes    GERD without esophagitis [K21.9]  Yes      Resolved Hospital Problems   No resolved problems to display.          Hospital Course:  Beckie Collazo is a 78 y.o. female w COPD on 2 liters n/c, uncontrolled anxiety who presented from the [place] with worsening shortness of breath and symptoms such as a panic attack. Neighbor called 911 before reported to staff in [facility] and presented to our ER.    Patient reports this was c/w prior panic attacks with uncontrolled anxiety. D/w [place] and patient being seen regularly by psychiatry there with titration of medications. Seen as recently as 3 weeks ago.     No cough, no increased dyspnea now on 2 liters n/c outside of during panic attack as above so will stop COPD exacerbation treatments given this    D/w patient, daughter and [place] - will return back today. Will increase buspar to 10 mg BID and then let further follow up with OP psychiatrist.    Gross hematuria reported to nursing after my exam - u/a performed, will f/u culture but treat empirically w macrobid for now despite minimal symptoms. Recommend follow-up u/a outpatient w evaluation of hematuria if persistent    Discharge Follow Up Recommendations for outpatient labs/diagnostics:   F/u psychiatrist as already scheduled   F/u PCP 1-2 weeks with repeat u/a,  +RBCs    Day of Discharge     HPI:   Patient feeling fine, no issues with breathing  Complaints about her living situation but denies abuse or feeling unsafe    Vital Signs:   Temp:  [97.7 °F (36.5 °C)-98 °F (36.7 °C)] 97.9 °F (36.6 °C)  Heart Rate:  [73-96] 78  Resp:  [16-22] 16  BP: (123-159)/(53-89) 159/70  Flow (L/min):  [1-2] 1      Physical Exam:  Constitutional: No acute distress, awake, alert frail older female sitting in bed  HENT: NCAT, mucous membranes moist, poor dentition  Respiratory: Clear to auscultation bilaterally, respiratory effort normal normal sats on 2 liters n/c (home amount)  Cardiovascular: RRR, no murmurs, rubs, or gallops  Gastrointestinal: Soft, nontender, nondistended  Musculoskeletal: Muscle tone within normal limits, no joint effusions appreciated  Psychiatric: Anxious affect, cooperative. Denies SI  Neurologic: Alert and oriented, facial movements symmetric and spontaneous movement of all 4 extremities grossly equal bilaterally, speech clear  Skin: No rashes    Pertinent  and/or Most Recent Results     LAB RESULTS:      Lab 02/03/24  0742 02/02/24 2358 02/02/24 1938   WBC 8.29 8.17 10.66   HEMOGLOBIN 10.3* 9.9* 10.0*   HEMATOCRIT 33.4* 32.4* 33.6*   PLATELETS 332 319 367   NEUTROS ABS 6.91 6.06 6.71   IMMATURE GRANS (ABS) 0.06* 0.02 0.05   LYMPHS ABS 1.27 1.63 2.30   MONOS ABS 0.04* 0.25 1.07*   EOS ABS 0.00 0.18 0.48*   MCV 97.7* 99.1* 100.6*   LACTATE  --   --  0.8         Lab 02/03/24  0742 02/02/24 2358 02/02/24 1938   SODIUM 141 141 142   POTASSIUM 4.2 4.2 4.6   CHLORIDE 103 102 101   CO2 29.0 30.0* 33.0*   ANION GAP 9.0 9.0 8.0   BUN 16 19 19   CREATININE 0.61 0.63 0.73   EGFR 92.2 91.5 84.8   GLUCOSE 141* 93 81   CALCIUM 8.9 8.6 8.5*   IONIZED CALCIUM  --  1.22  --    TSH  --  0.789  --          Lab 02/02/24 1938   TOTAL PROTEIN 6.7   ALBUMIN 3.5   GLOBULIN 3.2   ALT (SGPT) 11   AST (SGOT) 24   BILIRUBIN <0.2   ALK PHOS 116         Lab 02/02/24 1938   PROBNP 258.7    HSTROP T 10             Lab 02/02/24  2358   IRON 19*   IRON SATURATION (TSAT) 6*   TIBC 308   TRANSFERRIN 207   FERRITIN 35.95         Brief Urine Lab Results  (Last result in the past 365 days)        Color   Clarity   Blood   Leuk Est   Nitrite   Protein   CREAT   Urine HCG        02/03/24 1031 Yellow   Cloudy   Large (3+)   Large (3+)   Negative   30 mg/dL (1+)                 Microbiology Results (last 10 days)       Procedure Component Value - Date/Time    COVID-19 and FLU A/B PCR, 1 HR TAT - Swab, Nasopharynx [[id number]]  (Normal) Collected: 02/02/24 2000    Lab Status: Final result Specimen: Swab from Nasopharynx Updated: 02/02/24 2033     COVID19 Not Detected     Influenza A PCR Not Detected     Influenza B PCR Not Detected    Narrative:      Fact sheet for providers: https://www.fda.gov/media/142764/download    Fact sheet for patients: https://www.fda.gov/media/877335/download    Test performed by PCR.            XR Chest 1 View    Result Date: 2/2/2024  XR CHEST 1 VW Date of Exam: 2/2/2024 7:11 PM EST Indication: SOA triage protocol Comparison: None available. Findings: Chronic emphysematous changes are apparent, with some asymmetric opacities noted in the left perihilar region concerning for underlying airspace disease. Suspect trace left pleural effusion. There is no distinct pneumothorax. Unremarkable heart and mediastinal contours.     Impression: Opacities are present on the left which could represent acute airspace disease or some chronic interstitial process. Electronically Signed: Brandt Webster MD  2/2/2024 7:42 PM EST  Workstation ID: [id number]                 Plan for Follow-up of Pending Labs/Results: ngtd  Pending Labs       Order Current Status    Blood Culture - Blood, Arm, Right In process    Blood Culture - Blood, Wrist, Right In process    Folate In process    Urine Culture - Urine, Straight Cath In process    Vitamin B12 In process          Discharge Details        Discharge  Medications        New Medications        Instructions Start Date   nitrofurantoin (macrocrystal-monohydrate) 100 MG capsule  Commonly known as: Macrobid   100 mg, Oral, 2 Times Daily             Changes to Medications        Instructions Start Date   busPIRone 5 MG tablet  Commonly known as: BUSPAR  What changed: how much to take   10 mg, Oral, 2 Times Daily             Continue These Medications        Instructions Start Date   acetaminophen 325 MG tablet  Commonly known as: TYLENOL   650 mg, Oral, 2 Times Daily      albuterol sulfate  (90 Base) MCG/ACT inhaler  Commonly known as: PROVENTIL HFA;VENTOLIN HFA;PROAIR HFA   2 puffs, Inhalation, Every 6 Hours PRN      ALPRAZolam 0.25 MG tablet  Commonly known as: XANAX   0.25 mg, Oral, 2 Times Daily      aspirin 81 MG EC tablet   81 mg, Oral, Daily      baclofen 10 MG tablet  Commonly known as: LIORESAL   10 mg, Oral, 3 Times Daily PRN      Breo Ellipta 200-25 MCG/ACT inhaler  Generic drug: Fluticasone Furoate-Vilanterol   1 puff, Inhalation, Daily - RT      carvedilol 12.5 MG tablet  Commonly known as: COREG   12.5 mg, Oral, 2 Times Daily With Meals, Hold if BP less than 110/60 or HR <60/      cholecalciferol 25 MCG (1000 UT) tablet  Commonly known as: VITAMIN D3   2,000 Units, Oral, Daily      CLARITIN PO   10 mg, Oral, Nightly      Diclofenac Sodium 1 % gel gel  Commonly known as: VOLTAREN   2 g, Topical, 4 Times Daily PRN      fluticasone 50 MCG/ACT nasal spray  Commonly known as: FLONASE   2 sprays, Nasal, Daily      gabapentin 300 MG capsule  Commonly known as: NEURONTIN   300 mg, Oral, 3 Times Daily      GenTeal Tears 0.1-0.2-0.3 % solution   2 drops, Ophthalmic, 3 Times Daily, 1-2 drops in bilateral eyes      GUAIFENESIN ER PO   600 mg, Oral, 2 Times Daily      ipratropium-albuterol 0.5-2.5 mg/3 ml nebulizer  Commonly known as: DUO-NEB   3 mL, Nebulization, 3 Times Daily PRN      levothyroxine 25 MCG tablet  Commonly known as: SYNTHROID, LEVOTHROID   25  mcg, Oral, Every Early Morning      loperamide 2 MG capsule  Commonly known as: IMODIUM   2 mg, Oral, 4 Times Daily PRN, Give 2 tabs after 1st loose stool; give 1 tablet for any loose stools after. Max dose 4 tablets within a 24 hr period.      magnesium oxide 400 MG tablet  Commonly known as: MAG-OX   400 mg, Oral, Daily      melatonin 3 MG tablet   6 mg, Oral, Nightly      mirtazapine 15 MG tablet  Commonly known as: REMERON   7.5 mg, Oral, Nightly      ondansetron 4 MG tablet  Commonly known as: ZOFRAN   4 mg, Oral, 4 Times Daily PRN      pantoprazole 40 MG EC tablet  Commonly known as: PROTONIX   40 mg, Oral, Daily      POTASSIUM & SODIUM PHOSPHATES PO   250 mg, Oral, Daily, 250mg - 45 mg - 298 mg tablet      sertraline 100 MG tablet  Commonly known as: ZOLOFT   100 mg, Oral, Daily      traMADol 50 MG tablet  Commonly known as: ULTRAM   50 mg, Oral, Every 6 Hours PRN      VITAMIN E 400 UNIT capsule  Generic drug: vitamin E   400 Units, Oral, Daily               Allergies   Allergen Reactions    Codeine Unknown - Low Severity    Zanaflex [Tizanidine Hcl] Other (See Comments)     Low blood pressure         Discharge Disposition: back to [facility]  Long Term Care ([place] - External)    Diet:  Hospital:  Diet Order   Procedures    Diet: Cardiac Diets; Healthy Heart (2-3 Na+); Texture: Regular Texture (IDDSI 7); Fluid Consistency: Thin (IDDSI 0)            Activity:  prior level    Restrictions or Other Recommendations:  none       CODE STATUS:    Code Status and Medical Interventions:   Ordered at: 02/02/24 9899     Code Status (Patient has no pulse and is not breathing):    CPR (Attempt to Resuscitate)     Medical Interventions (Patient has pulse or is breathing):    Full Support       No future appointments.    Additional Instructions for the Follow-ups that You Need to Schedule       Discharge Follow-up with Specified Provider: OP psychiatrist as planned   As directed      To: OP  psychiatrist as planned                Florence Davis MD  02/03/24      Time Spent on Discharge:  I spent 40 minutes on this discharge activity which included: face-to-face encounter with the patient, reviewing the data in the system, coordination of the care with the nursing staff as well as consultants, documentation, and entering orders.

## 2024-02-03 NOTE — PLAN OF CARE
Problem: Adult Inpatient Plan of Care  Goal: Plan of Care Review  Outcome: Ongoing, Progressing  Goal: Patient-Specific Goal (Individualized)  Outcome: Ongoing, Progressing  Goal: Absence of Hospital-Acquired Illness or Injury  Outcome: Ongoing, Progressing  Intervention: Identify and Manage Fall Risk  Recent Flowsheet Documentation  Taken 2/3/2024 0600 by America Pires RN  Safety Promotion/Fall Prevention:   assistive device/personal items within reach   clutter free environment maintained   fall prevention program maintained   lighting adjusted   room organization consistent   safety round/check completed  Taken 2/3/2024 0400 by America Pires RN  Safety Promotion/Fall Prevention:   assistive device/personal items within reach   clutter free environment maintained   fall prevention program maintained   lighting adjusted   room organization consistent   safety round/check completed  Taken 2/3/2024 0200 by America Pires RN  Safety Promotion/Fall Prevention:   assistive device/personal items within reach   clutter free environment maintained   fall prevention program maintained   lighting adjusted   room organization consistent   safety round/check completed  Taken 2/3/2024 0000 by America Pires RN  Safety Promotion/Fall Prevention:   assistive device/personal items within reach   clutter free environment maintained   fall prevention program maintained   lighting adjusted   room organization consistent   safety round/check completed  Intervention: Prevent Skin Injury  Recent Flowsheet Documentation  Taken 2/3/2024 0600 by America Pires RN  Body Position: position changed independently  Taken 2/3/2024 0400 by America Pires RN  Body Position: position changed independently  Skin Protection:   adhesive use limited   incontinence pads utilized   tubing/devices free from skin contact   transparent dressing maintained  Taken 2/3/2024 0200 by America Pires RN  Body Position: position changed  independently  Taken 2/3/2024 0000 by America Pires RN  Body Position: position changed independently  Skin Protection:   adhesive use limited   incontinence pads utilized   tubing/devices free from skin contact   transparent dressing maintained  Intervention: Prevent and Manage VTE (Venous Thromboembolism) Risk  Recent Flowsheet Documentation  Taken 2/3/2024 0600 by America Pires, RN  Activity Management: activity encouraged  Taken 2/3/2024 0400 by America Pires RN  Activity Management: activity minimized  Taken 2/3/2024 0200 by America Pires RN  Activity Management: activity minimized  Taken 2/3/2024 0000 by America Pires RN  Activity Management: activity minimized  Goal: Optimal Comfort and Wellbeing  Outcome: Ongoing, Progressing  Intervention: Provide Person-Centered Care  Recent Flowsheet Documentation  Taken 2/3/2024 0000 by America Pires RN  Trust Relationship/Rapport:   care explained   choices provided   emotional support provided  Goal: Readiness for Transition of Care  Outcome: Ongoing, Progressing  Intervention: Mutually Develop Transition Plan  Recent Flowsheet Documentation  Taken 2/2/2024 2239 by America Pires RN  Transportation Anticipated: agency  Patient/Family Anticipated Services at Transition:   Patient/Family Anticipates Transition to:   home with family   home with help/services  Taken 2/2/2024 2234 by America Pires RN  Equipment Currently Used at Home: wheelchair     Problem: COPD (Chronic Obstructive Pulmonary Disease) Comorbidity  Goal: Maintenance of COPD Symptom Control  Outcome: Ongoing, Progressing  Intervention: Maintain COPD-Symptom Control  Recent Flowsheet Documentation  Taken 2/3/2024 0600 by America Pires, RN  Medication Review/Management: medications reviewed  Taken 2/3/2024 0000 by America Pires RN  Medication Review/Management: medications reviewed     Problem: Skin Injury Risk Increased  Goal: Skin Health and  Integrity  Outcome: Ongoing, Progressing  Intervention: Optimize Skin Protection  Recent Flowsheet Documentation  Taken 2/3/2024 0600 by America Pires RN  Head of Bed ([facility]) Positioning: HOB elevated  Taken 2/3/2024 0400 by America Pires RN  Pressure Reduction Techniques:   frequent weight shift encouraged   weight shift assistance provided  Head of Bed ([facility]) Positioning: HOB elevated  Pressure Reduction Devices:   positioning supports utilized   pressure-redistributing mattress utilized  Skin Protection:   adhesive use limited   incontinence pads utilized   tubing/devices free from skin contact   transparent dressing maintained  Taken 2/3/2024 0200 by America Pires RN  Head of Bed ([facility]) Positioning: HOB elevated  Taken 2/3/2024 0000 by America Pires RN  Pressure Reduction Techniques:   frequent weight shift encouraged   weight shift assistance provided  Head of Bed ([facility]) Positioning: [facility] elevated  Pressure Reduction Devices:   positioning supports utilized   pressure-redistributing mattress utilized  Skin Protection:   adhesive use limited   incontinence pads utilized   tubing/devices free from skin contact   transparent dressing maintained

## 2024-02-04 LAB — BACTERIA SPEC AEROBE CULT: NO GROWTH

## 2024-02-07 LAB
BACTERIA SPEC AEROBE CULT: NORMAL
BACTERIA SPEC AEROBE CULT: NORMAL

## 2024-03-23 ENCOUNTER — APPOINTMENT (OUTPATIENT)
Dept: GENERAL RADIOLOGY | Facility: HOSPITAL | Age: 79
End: 2024-03-23
Payer: MEDICARE

## 2024-03-23 ENCOUNTER — HOSPITAL ENCOUNTER (INPATIENT)
Facility: HOSPITAL | Age: 79
LOS: 3 days | Discharge: NURSING, REHAB OR LONG-TERM CARE | End: 2024-03-26
Attending: STUDENT IN AN ORGANIZED HEALTH CARE EDUCATION/TRAINING PROGRAM | Admitting: FAMILY MEDICINE
Payer: MEDICARE

## 2024-03-23 ENCOUNTER — APPOINTMENT (OUTPATIENT)
Dept: CT IMAGING | Facility: HOSPITAL | Age: 79
End: 2024-03-23
Payer: MEDICARE

## 2024-03-23 DIAGNOSIS — J18.9 PNEUMONIA OF RIGHT UPPER LOBE DUE TO INFECTIOUS ORGANISM: ICD-10-CM

## 2024-03-23 DIAGNOSIS — G89.29 OTHER CHRONIC PAIN: ICD-10-CM

## 2024-03-23 DIAGNOSIS — E87.29 RESPIRATORY ACIDOSIS: ICD-10-CM

## 2024-03-23 DIAGNOSIS — J44.1 COPD WITH ACUTE EXACERBATION: ICD-10-CM

## 2024-03-23 DIAGNOSIS — J96.02 ACUTE RESPIRATORY FAILURE WITH HYPOXIA AND HYPERCAPNIA: Primary | ICD-10-CM

## 2024-03-23 DIAGNOSIS — F41.0 PANIC ATTACK: ICD-10-CM

## 2024-03-23 DIAGNOSIS — J96.01 ACUTE RESPIRATORY FAILURE WITH HYPOXIA AND HYPERCAPNIA: Primary | ICD-10-CM

## 2024-03-23 DIAGNOSIS — R91.1 LUNG NODULE SEEN ON IMAGING STUDY: ICD-10-CM

## 2024-03-23 PROBLEM — F06.4 ANXIETY DISORDER DUE TO KNOWN PHYSIOLOGICAL CONDITION: Status: ACTIVE | Noted: 2024-03-23

## 2024-03-23 LAB
ALBUMIN SERPL-MCNC: 3.1 G/DL (ref 3.5–5.2)
ALBUMIN/GLOB SERPL: 0.9 G/DL
ALP SERPL-CCNC: 121 U/L (ref 39–117)
ALT SERPL W P-5'-P-CCNC: 13 U/L (ref 1–33)
ANION GAP SERPL CALCULATED.3IONS-SCNC: 3 MMOL/L (ref 5–15)
ARTERIAL PATENCY WRIST A: ABNORMAL
AST SERPL-CCNC: 22 U/L (ref 1–32)
ATMOSPHERIC PRESS: ABNORMAL MM[HG]
B PARAPERT DNA SPEC QL NAA+PROBE: NOT DETECTED
B PERT DNA SPEC QL NAA+PROBE: NOT DETECTED
BACTERIA UR QL AUTO: ABNORMAL /HPF
BASE EXCESS BLDA CALC-SCNC: 7.2 MMOL/L (ref 0–2)
BASOPHILS # BLD AUTO: 0.04 10*3/MM3 (ref 0–0.2)
BASOPHILS NFR BLD AUTO: 0.2 % (ref 0–1.5)
BDY SITE: ABNORMAL
BILIRUB SERPL-MCNC: 0.2 MG/DL (ref 0–1.2)
BILIRUB UR QL STRIP: NEGATIVE
BODY TEMPERATURE: 37
BUN SERPL-MCNC: 36 MG/DL (ref 8–23)
BUN/CREAT SERPL: 50 (ref 7–25)
C PNEUM DNA NPH QL NAA+NON-PROBE: NOT DETECTED
CALCIUM SPEC-SCNC: 9.2 MG/DL (ref 8.6–10.5)
CHLORIDE SERPL-SCNC: 96 MMOL/L (ref 98–107)
CLARITY UR: ABNORMAL
CO2 BLDA-SCNC: 36.7 MMOL/L (ref 22–33)
CO2 SERPL-SCNC: 37 MMOL/L (ref 22–29)
COHGB MFR BLD: 1.2 % (ref 0–2)
COLOR UR: YELLOW
CREAT SERPL-MCNC: 0.72 MG/DL (ref 0.57–1)
D-LACTATE SERPL-SCNC: 1 MMOL/L (ref 0.5–2)
DEPRECATED RDW RBC AUTO: 51.4 FL (ref 37–54)
EGFRCR SERPLBLD CKD-EPI 2021: 86.2 ML/MIN/1.73
EOSINOPHIL # BLD AUTO: 0.33 10*3/MM3 (ref 0–0.4)
EOSINOPHIL NFR BLD AUTO: 1.4 % (ref 0.3–6.2)
EPAP: 0
ERYTHROCYTE [DISTWIDTH] IN BLOOD BY AUTOMATED COUNT: 13.7 % (ref 12.3–15.4)
FLUAV SUBTYP SPEC NAA+PROBE: NOT DETECTED
FLUBV RNA ISLT QL NAA+PROBE: NOT DETECTED
GLOBULIN UR ELPH-MCNC: 3.4 GM/DL
GLUCOSE SERPL-MCNC: 106 MG/DL (ref 65–99)
GLUCOSE UR STRIP-MCNC: NEGATIVE MG/DL
HADV DNA SPEC NAA+PROBE: NOT DETECTED
HCO3 BLDA-SCNC: 34.7 MMOL/L (ref 20–26)
HCOV 229E RNA SPEC QL NAA+PROBE: NOT DETECTED
HCOV HKU1 RNA SPEC QL NAA+PROBE: NOT DETECTED
HCOV NL63 RNA SPEC QL NAA+PROBE: NOT DETECTED
HCOV OC43 RNA SPEC QL NAA+PROBE: NOT DETECTED
HCT VFR BLD AUTO: 29.9 % (ref 34–46.6)
HCT VFR BLD CALC: 28.6 % (ref 38–51)
HGB BLD-MCNC: 8.7 G/DL (ref 12–15.9)
HGB BLDA-MCNC: 9.3 G/DL (ref 14–18)
HGB UR QL STRIP.AUTO: ABNORMAL
HMPV RNA NPH QL NAA+NON-PROBE: NOT DETECTED
HOLD SPECIMEN: NORMAL
HPIV1 RNA ISLT QL NAA+PROBE: NOT DETECTED
HPIV2 RNA SPEC QL NAA+PROBE: NOT DETECTED
HPIV3 RNA NPH QL NAA+PROBE: NOT DETECTED
HPIV4 P GENE NPH QL NAA+PROBE: NOT DETECTED
HYALINE CASTS UR QL AUTO: ABNORMAL /LPF
IMM GRANULOCYTES # BLD AUTO: 0.26 10*3/MM3 (ref 0–0.05)
IMM GRANULOCYTES NFR BLD AUTO: 1.1 % (ref 0–0.5)
INHALED O2 CONCENTRATION: 28 %
IPAP: 0
KETONES UR QL STRIP: NEGATIVE
LEUKOCYTE ESTERASE UR QL STRIP.AUTO: ABNORMAL
LYMPHOCYTES # BLD AUTO: 1.32 10*3/MM3 (ref 0.7–3.1)
LYMPHOCYTES NFR BLD AUTO: 5.4 % (ref 19.6–45.3)
M PNEUMO IGG SER IA-ACNC: NOT DETECTED
MAGNESIUM SERPL-MCNC: 2.1 MG/DL (ref 1.6–2.4)
MCH RBC QN AUTO: 29.6 PG (ref 26.6–33)
MCHC RBC AUTO-ENTMCNC: 29.1 G/DL (ref 31.5–35.7)
MCV RBC AUTO: 101.7 FL (ref 79–97)
METHGB BLD QL: 0 % (ref 0–1.5)
MODALITY: ABNORMAL
MONOCYTES # BLD AUTO: 1.53 10*3/MM3 (ref 0.1–0.9)
MONOCYTES NFR BLD AUTO: 6.3 % (ref 5–12)
MRSA DNA SPEC QL NAA+PROBE: NEGATIVE
NEUTROPHILS NFR BLD AUTO: 20.82 10*3/MM3 (ref 1.7–7)
NEUTROPHILS NFR BLD AUTO: 85.6 % (ref 42.7–76)
NITRITE UR QL STRIP: NEGATIVE
NRBC BLD AUTO-RTO: 0 /100 WBC (ref 0–0.2)
NT-PROBNP SERPL-MCNC: 1082 PG/ML (ref 0–1800)
OXYHGB MFR BLDV: 92.6 % (ref 94–99)
PAW @ PEAK INSP FLOW SETTING VENT: 0 CMH2O
PCO2 BLDA: 66.7 MM HG (ref 35–45)
PCO2 TEMP ADJ BLD: 66.7 MM HG (ref 35–45)
PH BLDA: 7.32 PH UNITS (ref 7.35–7.45)
PH UR STRIP.AUTO: 6 [PH] (ref 5–8)
PH, TEMP CORRECTED: 7.32 PH UNITS
PLATELET # BLD AUTO: 314 10*3/MM3 (ref 140–450)
PMV BLD AUTO: 9.6 FL (ref 6–12)
PO2 BLDA: 70.3 MM HG (ref 83–108)
PO2 TEMP ADJ BLD: 70.3 MM HG (ref 83–108)
POTASSIUM SERPL-SCNC: 3.7 MMOL/L (ref 3.5–5.2)
PROCALCITONIN SERPL-MCNC: 0.43 NG/ML (ref 0–0.25)
PROT SERPL-MCNC: 6.5 G/DL (ref 6–8.5)
PROT UR QL STRIP: ABNORMAL
RBC # BLD AUTO: 2.94 10*6/MM3 (ref 3.77–5.28)
RBC # UR STRIP: ABNORMAL /HPF
REF LAB TEST METHOD: ABNORMAL
RHINOVIRUS RNA SPEC NAA+PROBE: NOT DETECTED
RSV RNA NPH QL NAA+NON-PROBE: NOT DETECTED
SARS-COV-2 RNA NPH QL NAA+NON-PROBE: NOT DETECTED
SODIUM SERPL-SCNC: 136 MMOL/L (ref 136–145)
SP GR UR STRIP: 1.02 (ref 1–1.03)
SQUAMOUS #/AREA URNS HPF: ABNORMAL /HPF
T4 FREE SERPL-MCNC: 1.26 NG/DL (ref 0.92–1.68)
TOTAL RATE: 0 BREATHS/MINUTE
TROPONIN T SERPL HS-MCNC: 7 NG/L
TSH SERPL DL<=0.05 MIU/L-ACNC: 0.98 UIU/ML (ref 0.27–4.2)
UROBILINOGEN UR QL STRIP: ABNORMAL
WBC # UR STRIP: ABNORMAL /HPF
WBC NRBC COR # BLD AUTO: 24.3 10*3/MM3 (ref 3.4–10.8)
WHOLE BLOOD HOLD COAG: NORMAL
WHOLE BLOOD HOLD SPECIMEN: NORMAL

## 2024-03-23 PROCEDURE — 71250 CT THORAX DX C-: CPT

## 2024-03-23 PROCEDURE — 94640 AIRWAY INHALATION TREATMENT: CPT

## 2024-03-23 PROCEDURE — 25810000003 LACTATED RINGERS PER 1000 ML: Performed by: HOSPITALIST

## 2024-03-23 PROCEDURE — 82375 ASSAY CARBOXYHB QUANT: CPT

## 2024-03-23 PROCEDURE — 87040 BLOOD CULTURE FOR BACTERIA: CPT | Performed by: STUDENT IN AN ORGANIZED HEALTH CARE EDUCATION/TRAINING PROGRAM

## 2024-03-23 PROCEDURE — 83735 ASSAY OF MAGNESIUM: CPT | Performed by: STUDENT IN AN ORGANIZED HEALTH CARE EDUCATION/TRAINING PROGRAM

## 2024-03-23 PROCEDURE — 36600 WITHDRAWAL OF ARTERIAL BLOOD: CPT

## 2024-03-23 PROCEDURE — 81001 URINALYSIS AUTO W/SCOPE: CPT | Performed by: STUDENT IN AN ORGANIZED HEALTH CARE EDUCATION/TRAINING PROGRAM

## 2024-03-23 PROCEDURE — 99285 EMERGENCY DEPT VISIT HI MDM: CPT

## 2024-03-23 PROCEDURE — 84484 ASSAY OF TROPONIN QUANT: CPT | Performed by: STUDENT IN AN ORGANIZED HEALTH CARE EDUCATION/TRAINING PROGRAM

## 2024-03-23 PROCEDURE — 36415 COLL VENOUS BLD VENIPUNCTURE: CPT

## 2024-03-23 PROCEDURE — 87086 URINE CULTURE/COLONY COUNT: CPT | Performed by: INTERNAL MEDICINE

## 2024-03-23 PROCEDURE — 25010000002 METHYLPREDNISOLONE PER 125 MG: Performed by: STUDENT IN AN ORGANIZED HEALTH CARE EDUCATION/TRAINING PROGRAM

## 2024-03-23 PROCEDURE — 82805 BLOOD GASES W/O2 SATURATION: CPT

## 2024-03-23 PROCEDURE — 94799 UNLISTED PULMONARY SVC/PX: CPT

## 2024-03-23 PROCEDURE — 25810000003 SODIUM CHLORIDE 0.9 % SOLUTION: Performed by: HOSPITALIST

## 2024-03-23 PROCEDURE — 83050 HGB METHEMOGLOBIN QUAN: CPT

## 2024-03-23 PROCEDURE — 84439 ASSAY OF FREE THYROXINE: CPT | Performed by: STUDENT IN AN ORGANIZED HEALTH CARE EDUCATION/TRAINING PROGRAM

## 2024-03-23 PROCEDURE — 94660 CPAP INITIATION&MGMT: CPT

## 2024-03-23 PROCEDURE — 71045 X-RAY EXAM CHEST 1 VIEW: CPT

## 2024-03-23 PROCEDURE — 99223 1ST HOSP IP/OBS HIGH 75: CPT | Performed by: HOSPITALIST

## 2024-03-23 PROCEDURE — 71046 X-RAY EXAM CHEST 2 VIEWS: CPT

## 2024-03-23 PROCEDURE — 25010000002 PIPERACILLIN SOD-TAZOBACTAM PER 1 G: Performed by: STUDENT IN AN ORGANIZED HEALTH CARE EDUCATION/TRAINING PROGRAM

## 2024-03-23 PROCEDURE — 25010000002 MAGNESIUM SULFATE IN D5W 1G/100ML (PREMIX) 1-5 GM/100ML-% SOLUTION: Performed by: STUDENT IN AN ORGANIZED HEALTH CARE EDUCATION/TRAINING PROGRAM

## 2024-03-23 PROCEDURE — 83605 ASSAY OF LACTIC ACID: CPT | Performed by: STUDENT IN AN ORGANIZED HEALTH CARE EDUCATION/TRAINING PROGRAM

## 2024-03-23 PROCEDURE — 25810000003 SODIUM CHLORIDE 0.9 % SOLUTION: Performed by: STUDENT IN AN ORGANIZED HEALTH CARE EDUCATION/TRAINING PROGRAM

## 2024-03-23 PROCEDURE — 0202U NFCT DS 22 TRGT SARS-COV-2: CPT | Performed by: STUDENT IN AN ORGANIZED HEALTH CARE EDUCATION/TRAINING PROGRAM

## 2024-03-23 PROCEDURE — 80053 COMPREHEN METABOLIC PANEL: CPT | Performed by: STUDENT IN AN ORGANIZED HEALTH CARE EDUCATION/TRAINING PROGRAM

## 2024-03-23 PROCEDURE — 87077 CULTURE AEROBIC IDENTIFY: CPT | Performed by: INTERNAL MEDICINE

## 2024-03-23 PROCEDURE — 83880 ASSAY OF NATRIURETIC PEPTIDE: CPT | Performed by: STUDENT IN AN ORGANIZED HEALTH CARE EDUCATION/TRAINING PROGRAM

## 2024-03-23 PROCEDURE — 84145 PROCALCITONIN (PCT): CPT | Performed by: STUDENT IN AN ORGANIZED HEALTH CARE EDUCATION/TRAINING PROGRAM

## 2024-03-23 PROCEDURE — 93005 ELECTROCARDIOGRAM TRACING: CPT | Performed by: STUDENT IN AN ORGANIZED HEALTH CARE EDUCATION/TRAINING PROGRAM

## 2024-03-23 PROCEDURE — 25810000003 SODIUM CHLORIDE 0.9 % SOLUTION 250 ML FLEX CONT

## 2024-03-23 PROCEDURE — 84443 ASSAY THYROID STIM HORMONE: CPT | Performed by: STUDENT IN AN ORGANIZED HEALTH CARE EDUCATION/TRAINING PROGRAM

## 2024-03-23 PROCEDURE — 85025 COMPLETE CBC W/AUTO DIFF WBC: CPT | Performed by: STUDENT IN AN ORGANIZED HEALTH CARE EDUCATION/TRAINING PROGRAM

## 2024-03-23 PROCEDURE — 87641 MR-STAPH DNA AMP PROBE: CPT

## 2024-03-23 PROCEDURE — 25010000002 VANCOMYCIN HCL 1.25 G RECONSTITUTED SOLUTION 1 EACH VIAL

## 2024-03-23 RX ORDER — SERTRALINE HYDROCHLORIDE 100 MG/1
100 TABLET, FILM COATED ORAL DAILY
Status: DISCONTINUED | OUTPATIENT
Start: 2024-03-24 | End: 2024-03-26 | Stop reason: HOSPADM

## 2024-03-23 RX ORDER — PANTOPRAZOLE SODIUM 40 MG/1
40 TABLET, DELAYED RELEASE ORAL DAILY
Status: DISCONTINUED | OUTPATIENT
Start: 2024-03-24 | End: 2024-03-26 | Stop reason: HOSPADM

## 2024-03-23 RX ORDER — HEPARIN SODIUM 5000 [USP'U]/ML
5000 INJECTION, SOLUTION INTRAVENOUS; SUBCUTANEOUS EVERY 8 HOURS SCHEDULED
Status: DISCONTINUED | OUTPATIENT
Start: 2024-03-23 | End: 2024-03-26 | Stop reason: HOSPADM

## 2024-03-23 RX ORDER — BISACODYL 10 MG
10 SUPPOSITORY, RECTAL RECTAL DAILY PRN
Status: DISCONTINUED | OUTPATIENT
Start: 2024-03-23 | End: 2024-03-26 | Stop reason: HOSPADM

## 2024-03-23 RX ORDER — BUDESONIDE AND FORMOTEROL FUMARATE DIHYDRATE 160; 4.5 UG/1; UG/1
2 AEROSOL RESPIRATORY (INHALATION)
Status: DISCONTINUED | OUTPATIENT
Start: 2024-03-23 | End: 2024-03-26 | Stop reason: HOSPADM

## 2024-03-23 RX ORDER — METHYLPREDNISOLONE SODIUM SUCCINATE 125 MG/2ML
125 INJECTION, POWDER, LYOPHILIZED, FOR SOLUTION INTRAMUSCULAR; INTRAVENOUS ONCE
Status: COMPLETED | OUTPATIENT
Start: 2024-03-23 | End: 2024-03-23

## 2024-03-23 RX ORDER — METHYLPREDNISOLONE SODIUM SUCCINATE 125 MG/2ML
60 INJECTION, POWDER, LYOPHILIZED, FOR SOLUTION INTRAMUSCULAR; INTRAVENOUS EVERY 12 HOURS
Status: DISCONTINUED | OUTPATIENT
Start: 2024-03-24 | End: 2024-03-24

## 2024-03-23 RX ORDER — TRAMADOL HYDROCHLORIDE 50 MG/1
50 TABLET ORAL EVERY 6 HOURS PRN
Status: DISCONTINUED | OUTPATIENT
Start: 2024-03-23 | End: 2024-03-26 | Stop reason: HOSPADM

## 2024-03-23 RX ORDER — SODIUM CHLORIDE 0.9 % (FLUSH) 0.9 %
10 SYRINGE (ML) INJECTION AS NEEDED
Status: DISCONTINUED | OUTPATIENT
Start: 2024-03-23 | End: 2024-03-26 | Stop reason: HOSPADM

## 2024-03-23 RX ORDER — MELATONIN
2000 DAILY
Status: DISCONTINUED | OUTPATIENT
Start: 2024-03-24 | End: 2024-03-26 | Stop reason: HOSPADM

## 2024-03-23 RX ORDER — VITAMIN E 268 MG
400 CAPSULE ORAL DAILY
Status: DISCONTINUED | OUTPATIENT
Start: 2024-03-24 | End: 2024-03-26 | Stop reason: HOSPADM

## 2024-03-23 RX ORDER — IPRATROPIUM BROMIDE AND ALBUTEROL SULFATE 2.5; .5 MG/3ML; MG/3ML
3 SOLUTION RESPIRATORY (INHALATION) EVERY 4 HOURS PRN
Status: DISCONTINUED | OUTPATIENT
Start: 2024-03-23 | End: 2024-03-26 | Stop reason: HOSPADM

## 2024-03-23 RX ORDER — SODIUM CHLORIDE 0.9 % (FLUSH) 0.9 %
10 SYRINGE (ML) INJECTION EVERY 12 HOURS SCHEDULED
Status: DISCONTINUED | OUTPATIENT
Start: 2024-03-23 | End: 2024-03-26 | Stop reason: HOSPADM

## 2024-03-23 RX ORDER — AMOXICILLIN 250 MG
2 CAPSULE ORAL 2 TIMES DAILY PRN
Status: DISCONTINUED | OUTPATIENT
Start: 2024-03-23 | End: 2024-03-26 | Stop reason: HOSPADM

## 2024-03-23 RX ORDER — BUSPIRONE HYDROCHLORIDE 10 MG/1
10 TABLET ORAL 2 TIMES DAILY
Status: DISCONTINUED | OUTPATIENT
Start: 2024-03-23 | End: 2024-03-26 | Stop reason: HOSPADM

## 2024-03-23 RX ORDER — IPRATROPIUM BROMIDE AND ALBUTEROL SULFATE 2.5; .5 MG/3ML; MG/3ML
3 SOLUTION RESPIRATORY (INHALATION) ONCE
Status: COMPLETED | OUTPATIENT
Start: 2024-03-23 | End: 2024-03-23

## 2024-03-23 RX ORDER — BACLOFEN 10 MG/1
10 TABLET ORAL 3 TIMES DAILY PRN
Status: DISCONTINUED | OUTPATIENT
Start: 2024-03-23 | End: 2024-03-26 | Stop reason: HOSPADM

## 2024-03-23 RX ORDER — GUAIFENESIN 600 MG/1
600 TABLET, EXTENDED RELEASE ORAL 2 TIMES DAILY
Status: DISCONTINUED | OUTPATIENT
Start: 2024-03-23 | End: 2024-03-26 | Stop reason: HOSPADM

## 2024-03-23 RX ORDER — POLYETHYLENE GLYCOL 3350 17 G/17G
17 POWDER, FOR SOLUTION ORAL DAILY PRN
Status: DISCONTINUED | OUTPATIENT
Start: 2024-03-23 | End: 2024-03-26 | Stop reason: HOSPADM

## 2024-03-23 RX ORDER — SODIUM CHLORIDE 0.9 % (FLUSH) 0.9 %
10 SYRINGE (ML) INJECTION AS NEEDED
Status: DISCONTINUED | OUTPATIENT
Start: 2024-03-23 | End: 2024-03-23

## 2024-03-23 RX ORDER — GABAPENTIN 300 MG/1
300 CAPSULE ORAL 3 TIMES DAILY
Status: DISCONTINUED | OUTPATIENT
Start: 2024-03-23 | End: 2024-03-26 | Stop reason: HOSPADM

## 2024-03-23 RX ORDER — BISACODYL 5 MG/1
5 TABLET, DELAYED RELEASE ORAL DAILY PRN
Status: DISCONTINUED | OUTPATIENT
Start: 2024-03-23 | End: 2024-03-26 | Stop reason: HOSPADM

## 2024-03-23 RX ORDER — CARVEDILOL 12.5 MG/1
12.5 TABLET ORAL 2 TIMES DAILY WITH MEALS
Status: DISCONTINUED | OUTPATIENT
Start: 2024-03-23 | End: 2024-03-26 | Stop reason: HOSPADM

## 2024-03-23 RX ORDER — ALPRAZOLAM 0.25 MG/1
0.25 TABLET ORAL 2 TIMES DAILY
Status: DISCONTINUED | OUTPATIENT
Start: 2024-03-23 | End: 2024-03-26 | Stop reason: HOSPADM

## 2024-03-23 RX ORDER — MAGNESIUM SULFATE 1 G/100ML
1 INJECTION INTRAVENOUS ONCE
Status: COMPLETED | OUTPATIENT
Start: 2024-03-23 | End: 2024-03-23

## 2024-03-23 RX ORDER — ASPIRIN 81 MG/1
81 TABLET ORAL DAILY
Status: DISCONTINUED | OUTPATIENT
Start: 2024-03-24 | End: 2024-03-26 | Stop reason: HOSPADM

## 2024-03-23 RX ORDER — IPRATROPIUM BROMIDE AND ALBUTEROL SULFATE 2.5; .5 MG/3ML; MG/3ML
3 SOLUTION RESPIRATORY (INHALATION)
Status: DISCONTINUED | OUTPATIENT
Start: 2024-03-23 | End: 2024-03-26 | Stop reason: HOSPADM

## 2024-03-23 RX ORDER — FLUTICASONE PROPIONATE 50 MCG
2 SPRAY, SUSPENSION (ML) NASAL DAILY
Status: DISCONTINUED | OUTPATIENT
Start: 2024-03-24 | End: 2024-03-26 | Stop reason: HOSPADM

## 2024-03-23 RX ORDER — ONDANSETRON 4 MG/1
4 TABLET, ORALLY DISINTEGRATING ORAL EVERY 8 HOURS PRN
Status: DISCONTINUED | OUTPATIENT
Start: 2024-03-23 | End: 2024-03-26 | Stop reason: HOSPADM

## 2024-03-23 RX ORDER — SODIUM CHLORIDE 9 MG/ML
40 INJECTION, SOLUTION INTRAVENOUS AS NEEDED
Status: DISCONTINUED | OUTPATIENT
Start: 2024-03-23 | End: 2024-03-26 | Stop reason: HOSPADM

## 2024-03-23 RX ORDER — LEVOTHYROXINE SODIUM 0.03 MG/1
25 TABLET ORAL
Status: DISCONTINUED | OUTPATIENT
Start: 2024-03-24 | End: 2024-03-26 | Stop reason: HOSPADM

## 2024-03-23 RX ORDER — MIRTAZAPINE 15 MG/1
7.5 TABLET, FILM COATED ORAL NIGHTLY
Status: DISCONTINUED | OUTPATIENT
Start: 2024-03-23 | End: 2024-03-26 | Stop reason: HOSPADM

## 2024-03-23 RX ORDER — SODIUM CHLORIDE, SODIUM LACTATE, POTASSIUM CHLORIDE, CALCIUM CHLORIDE 600; 310; 30; 20 MG/100ML; MG/100ML; MG/100ML; MG/100ML
50 INJECTION, SOLUTION INTRAVENOUS CONTINUOUS
Status: DISCONTINUED | OUTPATIENT
Start: 2024-03-23 | End: 2024-03-26 | Stop reason: HOSPADM

## 2024-03-23 RX ORDER — CHOLECALCIFEROL (VITAMIN D3) 125 MCG
5 CAPSULE ORAL NIGHTLY
Status: DISCONTINUED | OUTPATIENT
Start: 2024-03-23 | End: 2024-03-26 | Stop reason: HOSPADM

## 2024-03-23 RX ADMIN — METHYLPREDNISOLONE SODIUM SUCCINATE 125 MG: 125 INJECTION, POWDER, FOR SOLUTION INTRAMUSCULAR; INTRAVENOUS at 18:12

## 2024-03-23 RX ADMIN — IPRATROPIUM BROMIDE AND ALBUTEROL SULFATE 3 ML: 2.5; .5 SOLUTION RESPIRATORY (INHALATION) at 17:21

## 2024-03-23 RX ADMIN — GUAIFENESIN 600 MG: 600 TABLET, EXTENDED RELEASE ORAL at 22:33

## 2024-03-23 RX ADMIN — MIRTAZAPINE 7.5 MG: 15 TABLET, FILM COATED ORAL at 22:32

## 2024-03-23 RX ADMIN — GABAPENTIN 300 MG: 300 CAPSULE ORAL at 22:33

## 2024-03-23 RX ADMIN — SODIUM CHLORIDE, POTASSIUM CHLORIDE, SODIUM LACTATE AND CALCIUM CHLORIDE 50 ML/HR: 600; 310; 30; 20 INJECTION, SOLUTION INTRAVENOUS at 22:37

## 2024-03-23 RX ADMIN — BUDESONIDE AND FORMOTEROL FUMARATE DIHYDRATE 2 PUFF: 160; 4.5 AEROSOL RESPIRATORY (INHALATION) at 21:26

## 2024-03-23 RX ADMIN — ALPRAZOLAM 0.25 MG: 0.25 TABLET ORAL at 22:33

## 2024-03-23 RX ADMIN — MAGNESIUM SULFATE HEPTAHYDRATE 1 G: 1 INJECTION, SOLUTION INTRAVENOUS at 18:13

## 2024-03-23 RX ADMIN — PIPERACILLIN AND TAZOBACTAM 3.38 G: 3; .375 INJECTION, POWDER, LYOPHILIZED, FOR SOLUTION INTRAVENOUS at 18:59

## 2024-03-23 RX ADMIN — Medication 10 ML: at 22:39

## 2024-03-23 RX ADMIN — SODIUM CHLORIDE 1000 ML: 9 INJECTION, SOLUTION INTRAVENOUS at 19:00

## 2024-03-23 RX ADMIN — VANCOMYCIN HYDROCHLORIDE 1250 MG: 1.25 INJECTION, POWDER, LYOPHILIZED, FOR SOLUTION INTRAVENOUS at 22:35

## 2024-03-23 RX ADMIN — SODIUM CHLORIDE 500 ML: 9 INJECTION, SOLUTION INTRAVENOUS at 18:12

## 2024-03-23 NOTE — ED PROVIDER NOTES
EMERGENCY DEPARTMENT ENCOUNTER    Pt Name: Beckie Romeo  MRN: [id number]  Pt :   1945  Room Number:    Date of encounter:  3/23/2024  PCP: Provider, No Known  ED Provider: Hiro Howell MD    Historian: EMS, patient      HPI:  Chief Complaint: Dyspnea, recent pneumonia        Context: Beckie Romeo is a 78-year-old woman with history of COPD on baseline 2 L of oxygen by nasal cannula who presents because of dyspnea.  She says she has had worsening shortness of breath for the last 2 weeks she was diagnosed with pneumonia and has completed a course of antibiotics.  Earlier today she felt dyspneic and one of her friends at her nursing facility thought she looked bad so called EMS.  She continues to complain of dyspnea upon arrival here denies any other complaints.       PAST MEDICAL HISTORY  Past Medical History:   Diagnosis Date    Age-related physical debility     Allergic rhinitis     Anemia     Atherosclerotic heart disease of native coronary artery without angina pectoris     Cognitive communication deficit     Contact with and (suspected) exposure to covid-19     COPD (chronic obstructive pulmonary disease)     Cough     Depression     Diarrhea, unspecified     Disease of thyroid gland     Flatulence     GERD (gastroesophageal reflux disease)     Hyperlipidemia     Hypertension     Insomnia     Major depressive disorder with single episode     Nausea with vomiting     Other specified anxiety disorders     Paroxysmal A-fib     Polyneuropathy     Sciatica of left side     Sciatica of right side     Unspecified protein-calorie malnutrition          PAST SURGICAL HISTORY  No past surgical history on file.      FAMILY HISTORY  Family History   Problem Relation Age of Onset    No Known Problems Mother     No Known Problems Father          SOCIAL HISTORY  Social History     Socioeconomic History    Marital status: Unknown   Tobacco Use    Smoking status: Former     Types: Cigarettes     Smokeless tobacco: Never   Vaping Use    Vaping status: Former    Passive vaping exposure: Yes   Substance and Sexual Activity    Alcohol use: Never    Drug use: Never    Sexual activity: Not Currently         ALLERGIES  Codeine and Zanaflex [tizanidine hcl]        REVIEW OF SYSTEMS  Review of Systems       All systems reviewed and negative except for those discussed in HPI.       PHYSICAL EXAM    I have reviewed the triage vital signs and nursing notes.    ED Triage Vitals   Temp Heart Rate Resp BP SpO2   03/23/24 1641 03/23/24 1641 03/23/24 1641 03/23/24 1643 03/23/24 1641   98 °F (36.7 °C) 78 20 130/58 (S) 94 %      Temp src Heart Rate Source Patient Position BP Location FiO2 (%)   03/23/24 1641 03/23/24 1641 -- -- --   Oral Monitor          Physical Exam  GENERAL:   Appears chronically ill but in no acute distress  HENT: Nares patent.  Nasal cannula in place  EYES: No scleral icterus.  CV: Regular rhythm, regular rate.  RESPIRATORY: Normal effort.  Faint end expiratory wheezes without focal consolidation or rails.  ABDOMEN: Soft, nontender  MUSCULOSKELETAL: No deformities.   NEURO: Alert, moves all extremities, follows commands.  SKIN: Warm, dry, no rash visualized.      LAB RESULTS  Recent Results (from the past 24 hour(s))   ECG 12 Lead ED Triage Standing Order; SOA    Collection Time: 03/23/24  4:46 PM   Result Value Ref Range    QT Interval 378 ms    QTC Interval 439 ms   Comprehensive Metabolic Panel    Collection Time: 03/23/24  5:03 PM    Specimen: Arm, Right; Blood   Result Value Ref Range    Glucose 106 (H) 65 - 99 mg/dL    BUN 36 (H) 8 - 23 mg/dL    Creatinine 0.72 0.57 - 1.00 mg/dL    Sodium 136 136 - 145 mmol/L    Potassium 3.7 3.5 - 5.2 mmol/L    Chloride 96 (L) 98 - 107 mmol/L    CO2 37.0 (H) 22.0 - 29.0 mmol/L    Calcium 9.2 8.6 - 10.5 mg/dL    Total Protein 6.5 6.0 - 8.5 g/dL    Albumin 3.1 (L) 3.5 - 5.2 g/dL    ALT (SGPT) 13 1 - 33 U/L    AST (SGOT) 22 1 - 32 U/L    Alkaline Phosphatase 121 (H)  39 - 117 U/L    Total Bilirubin 0.2 0.0 - 1.2 mg/dL    Globulin 3.4 gm/dL    A/G Ratio 0.9 g/dL    BUN/Creatinine Ratio 50.0 (H) 7.0 - 25.0    Anion Gap 3.0 (L) 5.0 - 15.0 mmol/L    eGFR 86.2 >60.0 mL/min/1.73   BNP    Collection Time: 03/23/24  5:03 PM    Specimen: Arm, Right; Blood   Result Value Ref Range    proBNP 1,082.0 0.0 - 1,800.0 pg/mL   Single High Sensitivity Troponin T    Collection Time: 03/23/24  5:03 PM    Specimen: Arm, Right; Blood   Result Value Ref Range    HS Troponin T 7 <14 ng/L   Green Top (Gel)    Collection Time: 03/23/24  5:03 PM   Result Value Ref Range    Extra Tube Hold for add-ons.    Lavender Top    Collection Time: 03/23/24  5:03 PM   Result Value Ref Range    Extra Tube hold for add-on    Gold Top - SST    Collection Time: 03/23/24  5:03 PM   Result Value Ref Range    Extra Tube Hold for add-ons.    Light Blue Top    Collection Time: 03/23/24  5:03 PM   Result Value Ref Range    Extra Tube Hold for add-ons.    CBC Auto Differential    Collection Time: 03/23/24  5:03 PM    Specimen: Arm, Right; Blood   Result Value Ref Range    WBC 24.30 (H) 3.40 - 10.80 10*3/mm3    RBC 2.94 (L) 3.77 - 5.28 10*6/mm3    Hemoglobin 8.7 (L) 12.0 - 15.9 g/dL    Hematocrit 29.9 (L) 34.0 - 46.6 %    .7 (H) 79.0 - 97.0 fL    MCH 29.6 26.6 - 33.0 pg    MCHC 29.1 (L) 31.5 - 35.7 g/dL    RDW 13.7 12.3 - 15.4 %    RDW-SD 51.4 37.0 - 54.0 fl    MPV 9.6 6.0 - 12.0 fL    Platelets 314 140 - 450 10*3/mm3    Neutrophil % 85.6 (H) 42.7 - 76.0 %    Lymphocyte % 5.4 (L) 19.6 - 45.3 %    Monocyte % 6.3 5.0 - 12.0 %    Eosinophil % 1.4 0.3 - 6.2 %    Basophil % 0.2 0.0 - 1.5 %    Immature Grans % 1.1 (H) 0.0 - 0.5 %    Neutrophils, Absolute 20.82 (H) 1.70 - 7.00 10*3/mm3    Lymphocytes, Absolute 1.32 0.70 - 3.10 10*3/mm3    Monocytes, Absolute 1.53 (H) 0.10 - 0.90 10*3/mm3    Eosinophils, Absolute 0.33 0.00 - 0.40 10*3/mm3    Basophils, Absolute 0.04 0.00 - 0.20 10*3/mm3    Immature Grans, Absolute 0.26 (H) 0.00 -  0.05 10*3/mm3    nRBC 0.0 0.0 - 0.2 /100 WBC   Lactic Acid, Plasma    Collection Time: 03/23/24  5:03 PM    Specimen: Arm, Right; Blood   Result Value Ref Range    Lactate 1.0 0.5 - 2.0 mmol/L   Procalcitonin    Collection Time: 03/23/24  5:03 PM    Specimen: Arm, Right; Blood   Result Value Ref Range    Procalcitonin 0.43 (H) 0.00 - 0.25 ng/mL   Magnesium    Collection Time: 03/23/24  5:03 PM    Specimen: Arm, Right; Blood   Result Value Ref Range    Magnesium 2.1 1.6 - 2.4 mg/dL   TSH    Collection Time: 03/23/24  5:03 PM    Specimen: Arm, Right; Blood   Result Value Ref Range    TSH 0.979 0.270 - 4.200 uIU/mL   T4, Free    Collection Time: 03/23/24  5:03 PM    Specimen: Arm, Right; Blood   Result Value Ref Range    Free T4 1.26 0.92 - 1.68 ng/dL   Blood Gas, Arterial With Co-Ox    Collection Time: 03/23/24  5:12 PM    Specimen: Arterial Blood   Result Value Ref Range    Site Left Radial     Chris's Test N/A     pH, Arterial 7.324 (L) 7.350 - 7.450 pH units    pCO2, Arterial 66.7 (H) 35.0 - 45.0 mm Hg    pO2, Arterial 70.3 (L) 83.0 - 108.0 mm Hg    HCO3, Arterial 34.7 (H) 20.0 - 26.0 mmol/L    Base Excess, Arterial 7.2 (H) 0.0 - 2.0 mmol/L    Hemoglobin, Blood Gas 9.3 (L) 14 - 18 g/dL    Hematocrit, Blood Gas 28.6 (L) 38.0 - 51.0 %    Oxyhemoglobin 92.6 (L) 94 - 99 %    Methemoglobin 0.00 0.00 - 1.50 %    Carboxyhemoglobin 1.2 0 - 2 %    CO2 Content 36.7 (H) 22 - 33 mmol/L    Temperature 37.0     Barometric Pressure for Blood Gas      Modality Nasal Cannula     FIO2 28 %    Rate 0 Breaths/minute    PIP 0 cmH2O    IPAP 0     EPAP 0     pH, Temp Corrected 7.324 pH Units    pCO2, Temperature Corrected 66.7 (H) 35 - 45 mm Hg    pO2, Temperature Corrected 70.3 (L) 83 - 108 mm Hg       If labs were ordered, I independently reviewed the results and considered them in treating the patient.        RADIOLOGY  XR Chest 2 View    Result Date: 3/23/2024  XR CHEST 1 VW Date of Exam: 3/23/2024 5:06 PM EDT Indication: dyspnea,  wheezing, recently finished abx for pneumonia Comparison: AP portable March 23, 2024 Findings: There is abnormal airspace disease in right upper lobe. This has developed since February suggesting this could reflect pneumonia.     Impression: 1.Airspace disease right upper lobe better defined on the AP portable obtained earlier which could be secondary to pneumonia given the interval change since February. Follow-up will be warranted to document resolution. Electronically Signed: Cesar French MD  3/23/2024 5:29 PM EDT  Workstation ID: [id number]    XR Chest 1 View    Result Date: 3/23/2024  XR CHEST 1 VW Date of Exam: 3/23/2024 4:44 PM EDT Indication: SOA triage protocol Comparison: 3/2/2024 Findings: Heart size normal. New consolidation overlying the right upper lung concerning for pneumonia. Blunting of the left ankle stable from the prior study which may relate to small left pleural effusion or scarring. No pneumothorax. Dense aortic arch atherosclerosis. Convex right thoracic dextrocurvature.     Impression: 1. Right upper lobe pneumonia. Recommend follow-up to ensure resolution. 2. Stable blunting of the left lateral costophrenic angle which may relate to scarring and/or small left effusion. Electronically Signed: Baljit Smith MD  3/23/2024 4:59 PM EDT  Workstation ID: [id number]     I ordered and independently reviewed the above noted radiographic studies.      I viewed images of chest x-ray which showed right middle and upper lobe pneumonia per my independent interpretation.    See radiologist's dictation for official interpretation.        PROCEDURES    Procedures    ECG 12 Lead ED Triage Standing Order; SOA   Preliminary Result   Test Reason : ED Triage Standing Order~   Blood Pressure :   */*   mmHG   Vent. Rate :  81 BPM     Atrial Rate :  81 BPM      P-R Int : 120 ms          QRS Dur :  68 ms       QT Int : 378 ms       P-R-T Axes :  83  56  49 degrees      QTc Int : 439 ms      Normal sinus rhythm   Normal  ECG   When compared with ECG of 02-FEB-2024 19:12,   Nonspecific T wave abnormality now evident in Inferior leads      Referred By: EDMD           Confirmed By:           MEDICATIONS GIVEN IN ER    Medications   sodium chloride 0.9 % flush 10 mL (has no administration in time range)   sodium chloride 0.9 % bolus 500 mL (500 mL Intravenous New Bag 3/23/24 1812)   magnesium sulfate in D5W 1g/100mL (PREMIX) (1 g Intravenous New Bag 3/23/24 1813)   Vancomycin HCl 1,250 mg in sodium chloride 0.9 % 250 mL VTB (has no administration in time range)   piperacillin-tazobactam (ZOSYN) 3.375 g IVPB in 100 mL NS MBP (CD) (has no administration in time range)   ipratropium-albuterol (DUO-NEB) nebulizer solution 3 mL (3 mL Nebulization Given 3/23/24 1721)   methylPREDNISolone sodium succinate (SOLU-Medrol) injection 125 mg (125 mg Intravenous Given 3/23/24 1812)         MEDICAL DECISION MAKING, PROGRESS, and CONSULTS    All labs, if obtained, have been independently reviewed by me.  All radiology studies, if obtained, have been reviewed by me and the radiologist dictating the report.  All EKG's, if obtained, have been independently viewed and interpreted by me/my attending physician.      Discussion below represents my analysis of pertinent findings related to patient's condition, differential diagnosis, treatment plan and final disposition.                         Differential diagnosis:    Acute respiratory failure, respiratory acidosis, COPD exacerbation, pneumonia, COVID, flu, viral URI, sepsis, anemia, electrolyte abnormality      Additional sources:    - Discussed/ obtained information from independent historians: EMS    - External (non-ED) record review:  Chart review of hospitalization for panic attack and COPD exacerbation in February shows history of:  Panic attack [F41.0]   Yes   · Hematuria [R31.9]   Yes  · COPD (chronic obstructive pulmonary disease) [J44.9]   Yes  · Hypocalcemia [E83.51]   Yes  · Anemia [D64.9]    Yes  · Hypothyroidism [E03.9]   Yes  · Essential hypertension [I10]   Yes  · GERD without esophagitis [K21.9]     - Chronic or social conditions impacting care: COPD on baseline 2 L nasal cannula, anxiety, hypothyroidism    - Shared decision making: Agreeable to hospital admission      Orders placed during this visit:  Orders Placed This Encounter   Procedures    COVID PRE-OP / PRE-PROCEDURE SCREENING ORDER (NO ISOLATION) - Swab, Nasopharynx    Blood Culture - Blood,    Blood Culture - Blood,    Respiratory Panel PCR w/COVID-19(SARS-CoV-2) [facility] In-House, NP Swab in UTM/VTM, 2 HR TAT - Swab, Nasopharynx    MRSA Screen, PCR (Inpatient) - Swab, Nares    Respiratory Culture - Sputum, Cough    XR Chest 1 View    XR Chest 2 View    [facility] Draw    Comprehensive Metabolic Panel    BNP    Single High Sensitivity Troponin T    CBC Auto Differential    Urinalysis With Microscopic If Indicated (No Culture) - Urine, Catheter    Blood Gas, Arterial -With Co-Ox Panel: Yes    Lactic Acid, Plasma    Procalcitonin    Magnesium    TSH    T4, Free    Blood Gas, Arterial With Co-Ox    NPO Diet NPO Type: Strict NPO    Undress & Gown    Continuous Pulse Oximetry    Vital Signs    Oxygen Therapy- Nasal Cannula; Titrate 1-6 LPM Per SpO2; 90 - 95%    NIPPV (CPAP or BIPAP)    ECG 12 Lead ED Triage Standing Order; SOA    Insert Peripheral IV    Inpatient Admission    ED Bed Request    CBC & Differential    Green Top (Gel)    Lavender Top    Gold Top - SST    Gray Top    Light Blue Top         Additional orders considered but not ordered:      ED Course:    Consultants:      ED Course as of 03/23/24 1843   Sat Mar 23, 2024   1641 Chart review of hospitalization for panic attack and COPD exacerbation in February shows history of:  Panic attack [F41.0]   Yes   · Hematuria [R31.9]   Yes  · COPD (chronic obstructive pulmonary disease) [J44.9]   Yes  · Hypocalcemia [E83.51]   Yes  · Anemia [D64.9]   Yes  · Hypothyroidism  [E03.9]   Yes  · Essential hypertension [I10]   Yes  · GERD without esophagitis [K21.9]       [CC]   1649 In summary this is a 78-year-old woman with history of COPD on baseline 2 L of oxygen by nasal cannula who presents because of dyspnea.  She says she has had worsening shortness of breath for the last 2 weeks she was diagnosed with pneumonia and has completed a course of antibiotics.  Earlier today she felt dyspneic and one of her friends at her nursing facility thought she looked bad so called EMS.  She continues to complain of dyspnea upon arrival here denies any other complaints. [CC]   1650 She arrived awake and alert she is mildly dyspneic and does have faint end expiratory wheezing in all lung fields no appreciated focal consolidation or rails she is satting in the high 90s on her baseline 2 L nasal cannula.  Other vitals are within normal limits.  Obtaining full respiratory workup and infectious workup and treating with IV fluids Solu-Medrol DuoNeb and magnesium.  Will reevaluate pending initial workup. [CC]   1837 X-ray shows right middle and upper lobe pneumonia on personal review.  CBC concerning for sepsis with leukocytosis to 24,000 [CC]   1837 Has chronic anemia at 8.7 slightly worse than prior values.  Metabolic panel generally reassuring and nonactionable.  No elevation in lactic acid pointing away from septic shock.  Blood gas shows mild respiratory acidosis with hypercapnia and pH of 7.324 as well as mild hypoxemia she continues to sat well on 2 L nasal cannula but have ordered BiPAP because of the mild acidosis.  Procalcitonin elevated pointing towards pneumonia as well.  At this point I think she needs hospitalization for her pneumonia and acute respiratory failure.  She is agreeable to this plan.  She does not like BiPAP but is agreeable to doing it at least temporarily.  Medicine team consulted for admission. [CC]      ED Course User Index  [CC] Hiro Howell MD              Shared  Decision Making:  After my consideration of clinical presentation and any laboratory/radiology studies obtained, I discussed the findings with the patient/patient representative who is in agreement with the treatment plan and the final disposition.   Risks and benefits of discharge and/or observation/admission were discussed.       AS OF 18:43 EDT VITALS:    BP - 113/58  HR - 77  TEMP - 98 °F (36.7 °C) (Oral)  O2 SATS - 93%                  DIAGNOSIS  Final diagnoses:   Acute respiratory failure with hypoxia and hypercapnia   Respiratory acidosis   Pneumonia of right upper lobe due to infectious organism   COPD with acute exacerbation         DISPOSITION  Admit      Please note that portions of this document were completed with voice recognition software.        Hiro Howell MD  03/23/24 [id number]

## 2024-03-23 NOTE — Clinical Note
Level of Care: Telemetry [5]   Diagnosis: COPD with exacerbation [503170]   Admitting Physician: JOCELYN GARCIA [[id number]]

## 2024-03-23 NOTE — ED NOTES
Beckie Romeo    Nursing Report ED to Floor:  Mental status: A&OX4  Ambulatory status: AO1  Oxygen Therapy:  3L NC  Cardiac Rhythm: NSR  Admitted from: [place] at [facility]  Safety Concerns:  fall risk  Social Issues: none noted at this time.  ED Room #:  04    ED Nurse Phone Extension - [phone] or may call [phone].      HPI:   Chief Complaint   Patient presents with    Shortness of Breath       Past Medical History:  Past Medical History:   Diagnosis Date    Age-related physical debility     Allergic rhinitis     Anemia     Atherosclerotic heart disease of native coronary artery without angina pectoris     Cognitive communication deficit     Contact with and (suspected) exposure to covid-19     COPD (chronic obstructive pulmonary disease)     Cough     Depression     Diarrhea, unspecified     Disease of thyroid gland     Flatulence     GERD (gastroesophageal reflux disease)     Hyperlipidemia     Hypertension     Insomnia     Major depressive disorder with single episode     Nausea with vomiting     Other specified anxiety disorders     Paroxysmal A-fib     Polyneuropathy     Sciatica of left side     Sciatica of right side     Unspecified protein-calorie malnutrition         Past Surgical History:  No past surgical history on file.     Admitting Doctor:   Leroy Padilla MD    Consulting Provider(s):  Consults       No orders found from 2/23/2024 to 3/24/2024.             Admitting Diagnosis:   The primary encounter diagnosis was Acute respiratory failure with hypoxia and hypercapnia. Diagnoses of Respiratory acidosis, Pneumonia of right upper lobe due to infectious organism, and COPD with acute exacerbation were also pertinent to this visit.    Most Recent Vitals:   Vitals:    03/23/24 1730 03/23/24 1759 03/23/24 1809 03/23/24 1900   BP: 113/58 100/51  112/50   Pulse: 77 72  81   Resp:       Temp:       TempSrc:       SpO2: 93%  97% 93%   Weight:       Height:           Active LDAs/IV Access:   Lines, Drains &  Airways       Active LDAs       Name Placement date Placement time Site Days    Peripheral IV 03/23/24 1757 Anterior;Right Forearm 03/23/24 1757  Forearm  less than 1                    Labs (abnormal labs have a star):   Labs Reviewed   COMPREHENSIVE METABOLIC PANEL - Abnormal; Notable for the following components:       Result Value    Glucose 106 (*)     BUN 36 (*)     Chloride 96 (*)     CO2 37.0 (*)     Albumin 3.1 (*)     Alkaline Phosphatase 121 (*)     BUN/Creatinine Ratio 50.0 (*)     Anion Gap 3.0 (*)     All other components within normal limits    Narrative:     GFR Normal >60  Chronic Kidney Disease <60  Kidney Failure <15    The GFR formula is only valid for adults with stable renal function between ages 18 and 70.   CBC WITH AUTO DIFFERENTIAL - Abnormal; Notable for the following components:    WBC 24.30 (*)     RBC 2.94 (*)     Hemoglobin 8.7 (*)     Hematocrit 29.9 (*)     .7 (*)     MCHC 29.1 (*)     Neutrophil % 85.6 (*)     Lymphocyte % 5.4 (*)     Immature Grans % 1.1 (*)     Neutrophils, Absolute 20.82 (*)     Monocytes, Absolute 1.53 (*)     Immature Grans, Absolute 0.26 (*)     All other components within normal limits   URINALYSIS W/ MICROSCOPIC IF INDICATED (NO CULTURE) - Abnormal; Notable for the following components:    Appearance, UA Turbid (*)     Blood, UA Moderate (2+) (*)     Protein,  mg/dL (2+) (*)     Leuk Esterase, UA Moderate (2+) (*)     All other components within normal limits   PROCALCITONIN - Abnormal; Notable for the following components:    Procalcitonin 0.43 (*)     All other components within normal limits    Narrative:     As a Marker for Sepsis (Non-Neonates):    1. <0.5 ng/mL represents a low risk of severe sepsis and/or septic shock.  2. >2 ng/mL represents a high risk of severe sepsis and/or septic shock.    As a Marker for Lower Respiratory Tract Infections that require antibiotic therapy:    PCT on Admission    Antibiotic Therapy       6-12 Hrs  "later    >0.5                Strongly Recommended  >0.25 - <0.5        Recommended   0.1 - 0.25          Discouraged              Remeasure/reassess PCT  <0.1                Strongly Discouraged     Remeasure/reassess PCT    As 28 day mortality risk marker: \"Change in Procalcitonin Result\" (>80% or <=80%) if Day 0 (or Day 1) and Day 4 values are available. Refer to [email or web address]-pct-[email or web address]    Change in PCT <=80%  A decrease of PCT levels below or equal to 80% defines a positive change in PCT test result representing a higher risk for 28-day all-cause mortality of patients diagnosed with severe sepsis for septic shock.    Change in PCT >80%  A decrease of PCT levels of more than 80% defines a negative change in PCT result representing a lower risk for 28-day all-cause mortality of patients diagnosed with severe sepsis or septic shock.      BLOOD GAS, ARTERIAL W/CO-OXIMETRY - Abnormal; Notable for the following components:    pH, Arterial 7.324 (*)     pCO2, Arterial 66.7 (*)     pO2, Arterial 70.3 (*)     HCO3, Arterial 34.7 (*)     Base Excess, Arterial 7.2 (*)     Hemoglobin, Blood Gas 9.3 (*)     Hematocrit, Blood Gas 28.6 (*)     Oxyhemoglobin 92.6 (*)     CO2 Content 36.7 (*)     pCO2, Temperature Corrected 66.7 (*)     pO2, Temperature Corrected 70.3 (*)     All other components within normal limits   RESPIRATORY PANEL PCR W/ COVID-19 (SARS-COV-2), NP SWAB IN UTM/VTP, 2 HR TAT - Normal    Narrative:     In the setting of a positive respiratory panel with a viral infection PLUS a negative procalcitonin without other underlying concern for bacterial infection, consider observing off antibiotics or discontinuation of antibiotics and continue supportive care. If the respiratory panel is positive for atypical bacterial infection (Bordetella pertussis, Chlamydophila pneumoniae, or Mycoplasma pneumoniae), consider antibiotic de-escalation to target atypical bacterial infection.   BNP (IN-HOUSE) - Normal    " Narrative:     This assay is used as an aid in the diagnosis of individuals suspected of having heart failure. It can be used as an aid in the diagnosis of acute decompensated heart failure (ADHF) in patients presenting with signs and symptoms of ADHF to the emergency department (ED). In addition, NT-proBNP of <300 pg/mL indicates ADHF is not likely.    Age Range Result Interpretation  NT-proBNP Concentration (pg/mL:      <50             Positive            >450                   Gray                 300-450                    Negative             <300    50-75           Positive            >900                  Gray                300-900                  Negative            <300      >75             Positive            >1800                  Gray                300-1800                  Negative            <300   SINGLE HS TROPONIN T - Normal    Narrative:     High Sensitive Troponin T Reference Range:  <14.0 ng/L- Negative Female for AMI  <22.0 ng/L- Negative Male for AMI  >=14 - Abnormal Female indicating possible myocardial injury.  >=22 - Abnormal Male indicating possible myocardial injury.   Clinicians would have to utilize clinical acumen, EKG, Troponin, and serial changes to determine if it is an Acute Myocardial Infarction or myocardial injury due to an underlying chronic condition.        LACTIC ACID, PLASMA - Normal   MAGNESIUM - Normal   TSH - Normal   T4, FREE - Normal   COVID PRE-OP / PRE-PROCEDURE SCREENING ORDER (NO ISOLATION)    Narrative:     The following orders were created for panel order COVID PRE-OP / PRE-PROCEDURE SCREENING ORDER (NO ISOLATION) - Swab, Nasopharynx.  Procedure                               Abnormality         Status                     ---------                               -----------         ------                     Respiratory Panel PCR w/...[[id number]]  Normal              Final result                 Please view results for these tests on the individual orders.    BLOOD CULTURE   BLOOD CULTURE   MRSA SCREEN, PCR   RESPIRATORY CULTURE   MRSA SCREEN, PCR   BLOOD GAS, ARTERIAL   RAINBOW DRAW    Narrative:     The following orders were created for panel order [place] Draw.  Procedure                               Abnormality         Status                     ---------                               -----------         ------                     Green Top (Gel)[[id number]]                                  Final result               Lavender Top[[id number]]                                     Final result               Gold Top - SST[[id number]]                                   Final result               Gray Top[[id number]]                                         In process                 Light Blue Top[[id number]]                                   Final result                 Please view results for these tests on the individual orders.   URINALYSIS, MICROSCOPIC ONLY   CBC AND DIFFERENTIAL    Narrative:     The following orders were created for panel order CBC & Differential.  Procedure                               Abnormality         Status                     ---------                               -----------         ------                     CBC Auto Differential[[id number]]        Abnormal            Final result                 Please view results for these tests on the individual orders.   GREEN TOP   LAVENDER TOP   GOLD TOP - SST   LIGHT BLUE TOP   GRAY TOP       Meds Given in ED:   Medications   sodium chloride 0.9 % flush 10 mL (has no administration in time range)   Vancomycin HCl 1,250 mg in sodium chloride 0.9 % 250 mL VTB (has no administration in time range)   piperacillin-tazobactam (ZOSYN) 3.375 g IVPB in 100 mL NS MBP (CD) (3.375 g Intravenous New Bag 3/23/24 4349)   sodium chloride 0.9 % bolus 1,000 mL (1,000 mL Intravenous New Bag 3/23/24 1900)   Pharmacy to dose vancomycin (has no administration in time range)   piperacillin-tazobactam (ZOSYN) 3.375 g IVPB in  100 mL NS MBP (CD) (has no administration in time range)   methylPREDNISolone sodium succinate (SOLU-Medrol) injection 60 mg (has no administration in time range)   ipratropium-albuterol (DUO-NEB) nebulizer solution 3 mL ( Nebulization Canceled Entry 3/23/24 1909)   ipratropium-albuterol (DUO-NEB) nebulizer solution 3 mL (has no administration in time range)   ipratropium-albuterol (DUO-NEB) nebulizer solution 3 mL (3 mL Nebulization Given 3/23/24 1721)   methylPREDNISolone sodium succinate (SOLU-Medrol) injection 125 mg (125 mg Intravenous Given 3/23/24 1812)   sodium chloride 0.9 % bolus 500 mL (0 mL Intravenous Stopped 3/23/24 1907)   magnesium sulfate in D5W 1g/100mL (PREMIX) (0 g Intravenous Stopped 3/23/24 1907)     Pharmacy to dose vancomycin,          Last NIH score:                                                          Dysphagia screening results:        [place] Coma Scale:  No data recorded     CIWA:        Restraint Type:            Isolation Status:  No active isolations

## 2024-03-23 NOTE — LETTER
EMS Transport Request  For use at [facility], [place], [place], [place], and [place] only   Patient Name: Beckie Romeo : 1945   Weight:61.2 kg (135 lb) Pick-up Location: [facility] BLS/ALS: BLS/ALS: BLS   Insurance: MEDICARE Auth End Date:   Pre-Cert #: D/C Summary complete:    Destination: Other [employer]   Contact Precautions: None   Equipment (O2, Fluids, etc.): O2, settings 2 L   Arrive By Date/Time: 3/26 Tues, 2-4 pm Stretcher/WC: Stretcher   CM Requesting: Karina Carvalho RN Ext: [phone]   Notes/Medical Necessity: AMS, supervision for safety, fall risk     ______________________________________________________________________    *Only 2 patient bags OR 1 carry-on size bag are permitted.  Wheelchairs and walkers CANNOT transported with the patient. Acknowledge: Yes

## 2024-03-23 NOTE — H&P
[facility] Medicine Services  HISTORY AND PHYSICAL    Patient Name: Beckie Romeo  : 1945  MRN: [id number]  Primary Care Physician: [place], No Known  Date of admission: 3/23/2024      Subjective   Subjective     Chief Complaint: Dyspnea    HPI:  Beckie Romeo is a 78 y.o. female with history of COPD on 2L, anxiety, HTN, HL here with 1 week SOA/cough/congestion. Denies f/c/sweats. Notes anorexia/weakness, but maintaining PO intake. No constipation/dysuria. Notes wheezing/tightness.     Review of Systems   Constitutional:  Positive for activity change and fatigue.   HENT:  Negative for trouble swallowing.    Respiratory:  Positive for cough, shortness of breath and wheezing.    Cardiovascular:  Negative for chest pain, palpitations and leg swelling.   Gastrointestinal: Negative.    Neurological:  Positive for weakness.           Personal History     Past Medical History:   Diagnosis Date    Age-related physical debility     Allergic rhinitis     Anemia     Atherosclerotic heart disease of native coronary artery without angina pectoris     Cognitive communication deficit     Contact with and (suspected) exposure to covid-19     COPD (chronic obstructive pulmonary disease)     Cough     Depression     Diarrhea, unspecified     Disease of thyroid gland     Flatulence     GERD (gastroesophageal reflux disease)     Hyperlipidemia     Hypertension     Insomnia     Major depressive disorder with single episode     Nausea with vomiting     Other specified anxiety disorders     Paroxysmal A-fib     Polyneuropathy     Sciatica of left side     Sciatica of right side     Unspecified protein-calorie malnutrition            Family History: family history includes No Known Problems in her father and mother.     Social History:  reports that she has quit smoking. Her smoking use included cigarettes. She has never used smokeless tobacco. She reports that she does not drink alcohol and does  not use drugs.  Social History     Social History Narrative    Not on file       Medications:  Available home medication information reviewed.  ALPRAZolam, Diclofenac Sodium, Fluticasone Furoate-Vilanterol, GenTeal Tears, Loratadine, Potassium & Sodium Phosphates, acetaminophen, albuterol sulfate HFA, aspirin, baclofen, busPIRone, carvedilol, cholecalciferol, fluticasone, gabapentin, guaiFENesin, ipratropium-albuterol, levothyroxine, loperamide, magnesium oxide, melatonin, mirtazapine, ondansetron, pantoprazole, sertraline, traMADol, and vitamin E    Allergies   Allergen Reactions    Codeine Unknown - Low Severity    Zanaflex [Tizanidine Hcl] Other (See Comments)     Low blood pressure       Objective   Objective     Vital Signs:   Temp:  [98 °F (36.7 °C)] 98 °F (36.7 °C)  Heart Rate:  [72-79] 72  Resp:  [20] 20  BP: (100-130)/(51-58) 100/51  Flow (L/min):  [3] 3       Physical Exam   NAD, alert and oriented  OP clear, dry MM, poor dentition  Neck supple  No LAD  RRR  Decreased/diminished, no wheezes/rhonchi  +BS, ND, NT, soft  ROSENBERG  Normal affect  Pale    Result Review:  I have personally reviewed the results from the time of this admission to 3/23/2024 19:01 EDT and agree with these findings:  []  Laboratory list / accordion  []  Microbiology  []  Radiology  []  EKG/Telemetry   []  Cardiology/Vascular   []  Pathology  []  Old records  []  Other:  Most notable findings include: MRSA PCR pending, Respiratory PCR negative, BUN 36, CO2 37, troponin 7, BNP 1082, WBC 24, Hgb 8.7, procal 0.43, CXR with RUL airspace disease      LAB RESULTS:      Lab 03/23/24  1703   WBC 24.30*   HEMOGLOBIN 8.7*   HEMATOCRIT 29.9*   PLATELETS 314   NEUTROS ABS 20.82*   IMMATURE GRANS (ABS) 0.26*   LYMPHS ABS 1.32   MONOS ABS 1.53*   EOS ABS 0.33   .7*   PROCALCITONIN 0.43*   LACTATE 1.0         Lab 03/23/24  1703   SODIUM 136   POTASSIUM 3.7   CHLORIDE 96*   CO2 37.0*   ANION GAP 3.0*   BUN 36*   CREATININE 0.72   EGFR 86.2    GLUCOSE 106*   CALCIUM 9.2   MAGNESIUM 2.1   TSH 0.979         Lab 03/23/24  1703   TOTAL PROTEIN 6.5   ALBUMIN 3.1*   GLOBULIN 3.4   ALT (SGPT) 13   AST (SGOT) 22   BILIRUBIN 0.2   ALK PHOS 121*         Lab 03/23/24  1703   PROBNP 1,082.0   HSTROP T 7                 Lab 03/23/24  1712   PH, ARTERIAL 7.324*   PCO2, ARTERIAL 66.7*   PO2 ART 70.3*   FIO2 28   HCO3 ART 34.7*   BASE EXCESS ART 7.2*   CARBOXYHEMOGLOBIN 1.2     UA          2/3/2024    10:31   Urinalysis   Squamous Epithelial Cells, UA 0-2    Specific Gravity, UA 1.013    Ketones, UA Trace    Blood, UA Large (3+)    Leukocytes, UA Large (3+)    Nitrite, UA Negative    RBC, UA Too Numerous to Count    WBC, UA Too Numerous to Count    Bacteria, UA None Seen        Microbiology Results (last 10 days)       Procedure Component Value - Date/Time    COVID PRE-OP / PRE-PROCEDURE SCREENING ORDER (NO ISOLATION) - Swab, Nasopharynx [[id number]]  (Normal) Collected: 03/23/24 1736    Lab Status: Final result Specimen: Swab from Nasopharynx Updated: 03/23/24 1845    Narrative:      The following orders were created for panel order COVID PRE-OP / PRE-PROCEDURE SCREENING ORDER (NO ISOLATION) - Swab, Nasopharynx.  Procedure                               Abnormality         Status                     ---------                               -----------         ------                     Respiratory Panel PCR w/...[[id number]]  Normal              Final result                 Please view results for these tests on the individual orders.    Respiratory Panel PCR w/COVID-19(SARS-CoV-2) [facility]THIA/PAD/COR/CRISSY In-House, NP Swab in UTM/VTM, 2 HR TAT - Swab, Nasopharynx [[id number]]  (Normal) Collected: 03/23/24 1736    Lab Status: Final result Specimen: Swab from Nasopharynx Updated: 03/23/24 1845     ADENOVIRUS, PCR Not Detected     Coronavirus 229E Not Detected     Coronavirus HKU1 Not Detected     Coronavirus NL63 Not Detected     Coronavirus OC43 Not Detected     COVID19 Not  Detected     Human Metapneumovirus Not Detected     Human Rhinovirus/Enterovirus Not Detected     Influenza A PCR Not Detected     Influenza B PCR Not Detected     Parainfluenza Virus 1 Not Detected     Parainfluenza Virus 2 Not Detected     Parainfluenza Virus 3 Not Detected     Parainfluenza Virus 4 Not Detected     RSV, PCR Not Detected     Bordetella pertussis pcr Not Detected     Bordetella parapertussis PCR Not Detected     Chlamydophila pneumoniae PCR Not Detected     Mycoplasma pneumo by PCR Not Detected    Narrative:      In the setting of a positive respiratory panel with a viral infection PLUS a negative procalcitonin without other underlying concern for bacterial infection, consider observing off antibiotics or discontinuation of antibiotics and continue supportive care. If the respiratory panel is positive for atypical bacterial infection (Bordetella pertussis, Chlamydophila pneumoniae, or Mycoplasma pneumoniae), consider antibiotic de-escalation to target atypical bacterial infection.            XR Chest 2 View    Result Date: 3/23/2024  XR CHEST 1 VW Date of Exam: 3/23/2024 5:06 PM EDT Indication: dyspnea, wheezing, recently finished abx for pneumonia Comparison: AP portable March 23, 2024 Findings: There is abnormal airspace disease in right upper lobe. This has developed since February suggesting this could reflect pneumonia.     Impression: Impression: 1.Airspace disease right upper lobe better defined on the AP portable obtained earlier which could be secondary to pneumonia given the interval change since February. Follow-up will be warranted to document resolution. Electronically Signed: Cesar French MD  3/23/2024 5:29 PM EDT  Workstation ID: [id number]    XR Chest 1 View    Result Date: 3/23/2024  XR CHEST 1 VW Date of Exam: 3/23/2024 4:44 PM EDT Indication: SOA triage protocol Comparison: 3/2/2024 Findings: Heart size normal. New consolidation overlying the right upper lung concerning for  pneumonia. Blunting of the left ankle stable from the prior study which may relate to small left pleural effusion or scarring. No pneumothorax. Dense aortic arch atherosclerosis. Convex right thoracic dextrocurvature.     Impression: Impression: 1. Right upper lobe pneumonia. Recommend follow-up to ensure resolution. 2. Stable blunting of the left lateral costophrenic angle which may relate to scarring and/or small left effusion. Electronically Signed: Baljit Smith MD  3/23/2024 4:59 PM EDT  Workstation ID: [id number]         Assessment & Plan   Assessment & Plan       COPD with exacerbation    Anemia    Hypothyroidism    Essential hypertension    GERD without esophagitis    Anxiety disorder due to known physiological condition    RUL PNA  COPD with AE  -CT with RUL mass versus PNA, needs follow up  -zosyn/vanc  -MRSA PCR  -nebs/pulmonary toilet    Possible UTI  -on zosyn    Leukocytosis  -monitor     Anemia  -without any obvious bleeding or history of bleeding    HTN  -monitor BP    GERD    Anxiety/mood disorder  -continue home meds      DVT prophylaxis:  Medical DVT prophylaxis orders are signed and held.            CODE STATUS:    Code Status and Medical Interventions:   Ordered at: 03/23/24 1900     Code Status (Patient has no pulse and is not breathing):    CPR (Attempt to Resuscitate)     Medical Interventions (Patient has pulse or is breathing):    Full Support       Expected Discharge   Expected discharge date/ time has not been documented.     Leroy Padilla MD  03/23/24

## 2024-03-24 LAB
ALBUMIN SERPL-MCNC: 3 G/DL (ref 3.5–5.2)
ALBUMIN/GLOB SERPL: 1.3 G/DL
ALP SERPL-CCNC: 113 U/L (ref 39–117)
ALT SERPL W P-5'-P-CCNC: 12 U/L (ref 1–33)
ANION GAP SERPL CALCULATED.3IONS-SCNC: 7 MMOL/L (ref 5–15)
AST SERPL-CCNC: 20 U/L (ref 1–32)
BILIRUB SERPL-MCNC: 0.2 MG/DL (ref 0–1.2)
BUN SERPL-MCNC: 28 MG/DL (ref 8–23)
BUN/CREAT SERPL: 43.1 (ref 7–25)
CALCIUM SPEC-SCNC: 8.4 MG/DL (ref 8.6–10.5)
CHLORIDE SERPL-SCNC: 103 MMOL/L (ref 98–107)
CO2 SERPL-SCNC: 32 MMOL/L (ref 22–29)
CREAT SERPL-MCNC: 0.65 MG/DL (ref 0.57–1)
DEPRECATED RDW RBC AUTO: 50.1 FL (ref 37–54)
EGFRCR SERPLBLD CKD-EPI 2021: 90.8 ML/MIN/1.73
ERYTHROCYTE [DISTWIDTH] IN BLOOD BY AUTOMATED COUNT: 13.8 % (ref 12.3–15.4)
GLOBULIN UR ELPH-MCNC: 2.4 GM/DL
GLUCOSE SERPL-MCNC: 121 MG/DL (ref 65–99)
HCT VFR BLD AUTO: 31.3 % (ref 34–46.6)
HGB BLD-MCNC: 9 G/DL (ref 12–15.9)
MCH RBC QN AUTO: 28.4 PG (ref 26.6–33)
MCHC RBC AUTO-ENTMCNC: 28.8 G/DL (ref 31.5–35.7)
MCV RBC AUTO: 98.7 FL (ref 79–97)
PLATELET # BLD AUTO: 305 10*3/MM3 (ref 140–450)
PMV BLD AUTO: 9.5 FL (ref 6–12)
POTASSIUM SERPL-SCNC: 4.1 MMOL/L (ref 3.5–5.2)
PROT SERPL-MCNC: 5.4 G/DL (ref 6–8.5)
RBC # BLD AUTO: 3.17 10*6/MM3 (ref 3.77–5.28)
SODIUM SERPL-SCNC: 142 MMOL/L (ref 136–145)
WBC NRBC COR # BLD AUTO: 12.1 10*3/MM3 (ref 3.4–10.8)

## 2024-03-24 PROCEDURE — 94799 UNLISTED PULMONARY SVC/PX: CPT

## 2024-03-24 PROCEDURE — 99232 SBSQ HOSP IP/OBS MODERATE 35: CPT | Performed by: INTERNAL MEDICINE

## 2024-03-24 PROCEDURE — 85027 COMPLETE CBC AUTOMATED: CPT | Performed by: HOSPITALIST

## 2024-03-24 PROCEDURE — 25010000002 METHYLPREDNISOLONE PER 125 MG: Performed by: HOSPITALIST

## 2024-03-24 PROCEDURE — 25010000002 PIPERACILLIN SOD-TAZOBACTAM PER 1 G: Performed by: HOSPITALIST

## 2024-03-24 PROCEDURE — 97162 PT EVAL MOD COMPLEX 30 MIN: CPT

## 2024-03-24 PROCEDURE — 94660 CPAP INITIATION&MGMT: CPT

## 2024-03-24 PROCEDURE — 25010000002 CEFTRIAXONE PER 250 MG: Performed by: INTERNAL MEDICINE

## 2024-03-24 PROCEDURE — 25010000002 HEPARIN (PORCINE) PER 1000 UNITS: Performed by: HOSPITALIST

## 2024-03-24 PROCEDURE — 80053 COMPREHEN METABOLIC PANEL: CPT | Performed by: HOSPITALIST

## 2024-03-24 RX ORDER — PREDNISONE 20 MG/1
40 TABLET ORAL
Status: DISCONTINUED | OUTPATIENT
Start: 2024-03-25 | End: 2024-03-26 | Stop reason: HOSPADM

## 2024-03-24 RX ORDER — SACCHAROMYCES BOULARDII 250 MG
250 CAPSULE ORAL 2 TIMES DAILY
Status: DISCONTINUED | OUTPATIENT
Start: 2024-03-24 | End: 2024-03-26 | Stop reason: HOSPADM

## 2024-03-24 RX ORDER — CIPROFLOXACIN 500 MG/1
500 TABLET, FILM COATED ORAL DAILY
COMMUNITY
End: 2024-03-26 | Stop reason: HOSPADM

## 2024-03-24 RX ORDER — DOXYCYCLINE 100 MG/1
100 CAPSULE ORAL EVERY 12 HOURS SCHEDULED
Status: DISCONTINUED | OUTPATIENT
Start: 2024-03-24 | End: 2024-03-26 | Stop reason: HOSPADM

## 2024-03-24 RX ADMIN — Medication 5 MG: at 20:40

## 2024-03-24 RX ADMIN — MIRTAZAPINE 7.5 MG: 15 TABLET, FILM COATED ORAL at 20:40

## 2024-03-24 RX ADMIN — Medication 2000 UNITS: at 08:59

## 2024-03-24 RX ADMIN — Medication 250 MG: at 20:41

## 2024-03-24 RX ADMIN — BUSPIRONE HYDROCHLORIDE 10 MG: 10 TABLET ORAL at 20:41

## 2024-03-24 RX ADMIN — Medication 10 ML: at 09:03

## 2024-03-24 RX ADMIN — GUAIFENESIN 600 MG: 600 TABLET, EXTENDED RELEASE ORAL at 09:04

## 2024-03-24 RX ADMIN — IPRATROPIUM BROMIDE AND ALBUTEROL SULFATE 3 ML: 2.5; .5 SOLUTION RESPIRATORY (INHALATION) at 01:30

## 2024-03-24 RX ADMIN — PANTOPRAZOLE SODIUM 40 MG: 40 TABLET, DELAYED RELEASE ORAL at 08:59

## 2024-03-24 RX ADMIN — SERTRALINE HYDROCHLORIDE 100 MG: 100 TABLET ORAL at 08:59

## 2024-03-24 RX ADMIN — ALPRAZOLAM 0.25 MG: 0.25 TABLET ORAL at 20:40

## 2024-03-24 RX ADMIN — FLUTICASONE PROPIONATE 2 SPRAY: 50 SPRAY, METERED NASAL at 09:04

## 2024-03-24 RX ADMIN — DOXYCYCLINE 100 MG: 100 CAPSULE ORAL at 08:58

## 2024-03-24 RX ADMIN — LEVOTHYROXINE SODIUM 25 MCG: 25 TABLET ORAL at 06:19

## 2024-03-24 RX ADMIN — BUSPIRONE HYDROCHLORIDE 10 MG: 10 TABLET ORAL at 08:59

## 2024-03-24 RX ADMIN — CARVEDILOL 12.5 MG: 12.5 TABLET, FILM COATED ORAL at 08:59

## 2024-03-24 RX ADMIN — ASPIRIN 81 MG: 81 TABLET, COATED ORAL at 08:58

## 2024-03-24 RX ADMIN — BACLOFEN 10 MG: 10 TABLET ORAL at 10:41

## 2024-03-24 RX ADMIN — HEPARIN SODIUM 5000 UNITS: 5000 INJECTION INTRAVENOUS; SUBCUTANEOUS at 20:41

## 2024-03-24 RX ADMIN — GABAPENTIN 300 MG: 300 CAPSULE ORAL at 08:59

## 2024-03-24 RX ADMIN — Medication 250 MG: at 09:05

## 2024-03-24 RX ADMIN — HEPARIN SODIUM 5000 UNITS: 5000 INJECTION INTRAVENOUS; SUBCUTANEOUS at 15:26

## 2024-03-24 RX ADMIN — IPRATROPIUM BROMIDE AND ALBUTEROL SULFATE 3 ML: 2.5; .5 SOLUTION RESPIRATORY (INHALATION) at 21:01

## 2024-03-24 RX ADMIN — DOXYCYCLINE 100 MG: 100 CAPSULE ORAL at 20:41

## 2024-03-24 RX ADMIN — Medication 400 UNITS: at 08:59

## 2024-03-24 RX ADMIN — GABAPENTIN 300 MG: 300 CAPSULE ORAL at 20:40

## 2024-03-24 RX ADMIN — SODIUM CHLORIDE 1000 MG: 900 INJECTION INTRAVENOUS at 09:02

## 2024-03-24 RX ADMIN — PIPERACILLIN AND TAZOBACTAM 3.38 G: 3; .375 INJECTION, POWDER, LYOPHILIZED, FOR SOLUTION INTRAVENOUS at 02:42

## 2024-03-24 RX ADMIN — ALPRAZOLAM 0.25 MG: 0.25 TABLET ORAL at 09:01

## 2024-03-24 RX ADMIN — GABAPENTIN 300 MG: 300 CAPSULE ORAL at 15:25

## 2024-03-24 RX ADMIN — METHYLPREDNISOLONE SODIUM SUCCINATE 60 MG: 125 INJECTION, POWDER, FOR SOLUTION INTRAMUSCULAR; INTRAVENOUS at 06:19

## 2024-03-24 RX ADMIN — GUAIFENESIN 600 MG: 600 TABLET, EXTENDED RELEASE ORAL at 20:40

## 2024-03-24 RX ADMIN — BUDESONIDE AND FORMOTEROL FUMARATE DIHYDRATE 2 PUFF: 160; 4.5 AEROSOL RESPIRATORY (INHALATION) at 21:01

## 2024-03-24 NOTE — PLAN OF CARE
Goal Outcome Evaluation:  Plan of Care Reviewed With: patient        Progress: no change  Outcome Evaluation: PT eval completed. Patient presents w/ generalized weakness, impaired balance, gait instability, decreased functional endurance, and is below her baseline. She completed bed mobility and transfers w/ min A, and ambulated 5ft w/ RW, min A, and readily fatigued. Skilled IPPT warranted to improve pt's safety and independence w/ functional mobility. Recommend SNF rehab at D/C.      Anticipated Discharge Disposition (PT): skilled nursing facility

## 2024-03-24 NOTE — PROGRESS NOTES
[facility] Medicine Services  PROGRESS NOTE    Patient Name: Beckie Romeo  : 1945  MRN: [id number]    Date of Admission: 3/23/2024  Primary Care Physician: Provider, No Known    Subjective   Subjective     CC:  F/U dyspnea    HPI:  She reports that she still isn't feeling well and is cold.  Wishes she had some items from home to make her feel more comfortable.      Objective   Objective     Vital Signs:   Temp:  [97.9 °F (36.6 °C)-98.5 °F (36.9 °C)] 97.9 °F (36.6 °C)  Heart Rate:  [55-88] 79  Resp:  [17-20] 18  BP: ()/(45-58) 104/52  Flow (L/min):  [2-3] 3     Physical Exam:  Constitutional: No acute distress, awake, alert, sitting up in bed  HENT: NCAT, mucous membranes moist  Respiratory: Clear to auscultation bilaterally, respiratory effort normal   Cardiovascular: RRR, no murmurs  Musculoskeletal: No bilateral ankle edema  Psychiatric: Appropriate affect, cooperative  Neurologic: Speech clear  Skin: No rashes on exposed surfaces    Results Reviewed:  LAB RESULTS:      Lab 24  0719 24  1703   WBC 12.10* 24.30*   HEMOGLOBIN 9.0* 8.7*   HEMATOCRIT 31.3* 29.9*   PLATELETS 305 314   NEUTROS ABS  --  20.82*   IMMATURE GRANS (ABS)  --  0.26*   LYMPHS ABS  --  1.32   MONOS ABS  --  1.53*   EOS ABS  --  0.33   MCV 98.7* 101.7*   PROCALCITONIN  --  0.43*   LACTATE  --  1.0         Lab 24  1703   SODIUM 136   POTASSIUM 3.7   CHLORIDE 96*   CO2 37.0*   ANION GAP 3.0*   BUN 36*   CREATININE 0.72   EGFR 86.2   GLUCOSE 106*   CALCIUM 9.2   MAGNESIUM 2.1   TSH 0.979         Lab 24  1703   TOTAL PROTEIN 6.5   ALBUMIN 3.1*   GLOBULIN 3.4   ALT (SGPT) 13   AST (SGOT) 22   BILIRUBIN 0.2   ALK PHOS 121*         Lab 24  1703   PROBNP 1,082.0   HSTROP T 7                 Lab 24  1712   PH, ARTERIAL 7.324*   PCO2, ARTERIAL 66.7*   PO2 ART 70.3*   FIO2 28   HCO3 ART 34.7*   BASE EXCESS ART 7.2*   CARBOXYHEMOGLOBIN 1.2     Brief Urine Lab Results  (Last  result in the past 365 days)        Color   Clarity   Blood   Leuk Est   Nitrite   Protein   CREAT   Urine HCG        03/23/24 1901 Yellow   Turbid   Moderate (2+)   Moderate (2+)   Negative   100 mg/dL (2+)                   Microbiology Results Abnormal       Procedure Component Value - Date/Time    MRSA Screen, PCR (Inpatient) - Swab, Nares [[id number]]  (Normal) Collected: 03/23/24 1843    Lab Status: Final result Specimen: Swab from Nares Updated: 03/23/24 2010     MRSA PCR Negative    Narrative:      The negative predictive value of this diagnostic test is high and should only be used to consider de-escalating anti-MRSA therapy. A positive result may indicate colonization with MRSA and must be correlated clinically.  MRSA Negative    COVID PRE-OP / PRE-PROCEDURE SCREENING ORDER (NO ISOLATION) - Swab, Nasopharynx [[id number]]  (Normal) Collected: 03/23/24 1736    Lab Status: Final result Specimen: Swab from Nasopharynx Updated: 03/23/24 1845    Narrative:      The following orders were created for panel order COVID PRE-OP / PRE-PROCEDURE SCREENING ORDER (NO ISOLATION) - Swab, Nasopharynx.  Procedure                               Abnormality         Status                     ---------                               -----------         ------                     Respiratory Panel PCR w/...[[id number]]  Normal              Final result                 Please view results for these tests on the individual orders.    Respiratory Panel PCR w/COVID-19(SARS-CoV-2) [facility] In-House, NP Swab in UTM/VTM, 2 HR TAT - Swab, Nasopharynx [[id number]]  (Normal) Collected: 03/23/24 1736    Lab Status: Final result Specimen: Swab from Nasopharynx Updated: 03/23/24 1845     ADENOVIRUS, PCR Not Detected     Coronavirus 229E Not Detected     Coronavirus HKU1 Not Detected     Coronavirus NL63 Not Detected     Coronavirus OC43 Not Detected     COVID19 Not Detected     Human Metapneumovirus Not Detected     Human  Rhinovirus/Enterovirus Not Detected     Influenza A PCR Not Detected     Influenza B PCR Not Detected     Parainfluenza Virus 1 Not Detected     Parainfluenza Virus 2 Not Detected     Parainfluenza Virus 3 Not Detected     Parainfluenza Virus 4 Not Detected     RSV, PCR Not Detected     Bordetella pertussis pcr Not Detected     Bordetella parapertussis PCR Not Detected     Chlamydophila pneumoniae PCR Not Detected     Mycoplasma pneumo by PCR Not Detected    Narrative:      In the setting of a positive respiratory panel with a viral infection PLUS a negative procalcitonin without other underlying concern for bacterial infection, consider observing off antibiotics or discontinuation of antibiotics and continue supportive care. If the respiratory panel is positive for atypical bacterial infection (Bordetella pertussis, Chlamydophila pneumoniae, or Mycoplasma pneumoniae), consider antibiotic de-escalation to target atypical bacterial infection.            CT Chest Without Contrast Diagnostic    Result Date: 3/23/2024  CT CHEST WO CONTRAST DIAGNOSTIC Date of Exam: 3/23/2024 7:09 PM EDT Indication: abnormal CXR. Comparison: Plain film obtained on the same date Technique: Axial CT images were obtained of the chest without contrast administration.  Reconstructed coronal and sagittal images were also obtained. Automated exposure control and iterative construction methods were used. Findings: Right upper lobe masslike opacity measuring 4.4 cm x 3.3 cm. Surrounding infiltration concerning for developing pneumonia within the right upper lobe. 0.8 cm right lower lobe nodule on image #70. Dependent atelectatic changes bilaterally. Pleural-based calcifications noted on the left. Volume loss in the left lower lobe indicative of underlying atelectasis. Partially calcified peripheral atelectatic changes in the left upper lobe. 1.1 cm x 0.6 cm left upper lobe nodule. 0.6 cm left upper lobe nodule on axial image #39. No pneumothorax  or pleural effusion. The thyroid gland is normal. The subglottic airway is clear. Severe atheromatous disease of the coronary vessels. No pericardial effusion. Right hilar calcified lymph node consistent with prior granulomatous disease. Extensive calcified granulomata within the liver and spleen. Medial left renal cyst. Thoracic vertebral body height and alignment are normal. Scoliotic curvature of the spine. The spinous processes are intact.     Impression: Rounded right upper lobe masslike opacity with surrounding infiltrate. This is concerning for an underlying mass. Differential considerations do include focal pneumonia. The surrounding infiltrate is concerning for developing pneumonia. Repeat plain film posttreatment recommended. Bilateral pulmonary nodules are also present. PET/CT and/or biopsy recommended.. Electronically Signed: Clive Reyes MD  3/23/2024 7:20 PM EDT  Workstation ID: [id number]    XR Chest 2 View    Result Date: 3/23/2024  XR CHEST 1 VW Date of Exam: 3/23/2024 5:06 PM EDT Indication: dyspnea, wheezing, recently finished abx for pneumonia Comparison: AP portable March 23, 2024 Findings: There is abnormal airspace disease in right upper lobe. This has developed since February suggesting this could reflect pneumonia.     Impression: Impression: 1.Airspace disease right upper lobe better defined on the AP portable obtained earlier which could be secondary to pneumonia given the interval change since February. Follow-up will be warranted to document resolution. Electronically Signed: Cesar French MD  3/23/2024 5:29 PM EDT  Workstation ID: [id number]    XR Chest 1 View    Result Date: 3/23/2024  XR CHEST 1 VW Date of Exam: 3/23/2024 4:44 PM EDT Indication: SOA triage protocol Comparison: 3/2/2024 Findings: Heart size normal. New consolidation overlying the right upper lung concerning for pneumonia. Blunting of the left ankle stable from the prior study which may relate to small left pleural effusion  or scarring. No pneumothorax. Dense aortic arch atherosclerosis. Convex right thoracic dextrocurvature.     Impression: Impression: 1. Right upper lobe pneumonia. Recommend follow-up to ensure resolution. 2. Stable blunting of the left lateral costophrenic angle which may relate to scarring and/or small left effusion. Electronically Signed: Baljit Smith MD  3/23/2024 4:59 PM EDT  Workstation ID: [id number]         Current medications:  Scheduled Meds:ALPRAZolam, 0.25 mg, Oral, BID  aspirin, 81 mg, Oral, Daily  budesonide-formoterol, 2 puff, Inhalation, BID - RT  busPIRone, 10 mg, Oral, BID  carvedilol, 12.5 mg, Oral, BID With Meals  cholecalciferol, 2,000 Units, Oral, Daily  fluticasone, 2 spray, Nasal, Daily  gabapentin, 300 mg, Oral, TID  guaiFENesin, 600 mg, Oral, BID  heparin (porcine), 5,000 Units, Subcutaneous, Q8H  ipratropium-albuterol, 3 mL, Nebulization, Q6H - RT  levothyroxine, 25 mcg, Oral, Q AM  melatonin, 5 mg, Oral, Nightly  methylPREDNISolone sodium succinate, 60 mg, Intravenous, Q12H  mirtazapine, 7.5 mg, Oral, Nightly  pantoprazole, 40 mg, Oral, Daily  piperacillin-tazobactam, 3.375 g, Intravenous, Q8H  sertraline, 100 mg, Oral, Daily  sodium chloride, 10 mL, Intravenous, Q12H  vancomycin, 750 mg, Intravenous, Q12H  vitamin E, 400 Units, Oral, Daily      Continuous Infusions:lactated ringers, 50 mL/hr, Last Rate: 50 mL/hr (03/23/24 0678)  Pharmacy to dose vancomycin,       PRN Meds:.  baclofen    senna-docusate sodium **AND** polyethylene glycol **AND** bisacodyl **AND** bisacodyl    Calcium Replacement - Follow Nurse / BPA Driven Protocol    ipratropium-albuterol    Magnesium Standard Dose Replacement - Follow Nurse / BPA Driven Protocol    ondansetron ODT    Pharmacy to dose vancomycin    Phosphorus Replacement - Follow Nurse / BPA Driven Protocol    Potassium Replacement - Follow Nurse / BPA Driven Protocol    sodium chloride    sodium chloride    traMADol    Assessment & Plan   Assessment & Plan      Active Hospital Problems    Diagnosis  POA    **COPD with exacerbation [J44.1]  Yes    Anxiety disorder due to known physiological condition [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        Brief Hospital Course to date:  Beckie Romeo is a 78 y.o. female with COPD on 2L O2, anxiety, HTN, HLD, who presented with dyspnea, cough, congestion x 1 week.  She was admitted for RUL pneumonia and COPD exacerbation.    This patient's problems and plans were partially entered by my partner and updated as appropriate by me 03/24/24.     RUL pneumonia  COPD with acute exacerbation  Chronic respiratory failure with hypoxia  Possible RUL mass  -Blood cultures pending, obtain sputum culture  -MRSA PCR negative so will d/c vancomycin  -Ceftriaxone and doxycycline  -S/P IV solumedrol; transition to PO prednisone  -Needs referral to lung nodule clinic at discharge for further evaluation of concerning RUL findings    Possible UTI  -Add urine culture  -Continue ceftriaxone    Anemia  -Monitor    HTN  -Continue carvedilol    Expected Discharge Location and Transportation: [facility]  Expected Discharge   Expected Discharge Date: 3/26/2024; Expected Discharge Time:      DVT prophylaxis:  Medical DVT prophylaxis orders are present.              CODE STATUS:   Code Status and Medical Interventions:   Ordered at: 03/23/24 1900     Code Status (Patient has no pulse and is not breathing):    CPR (Attempt to Resuscitate)     Medical Interventions (Patient has pulse or is breathing):    Full Support       Shirin Finley MD  03/24/24

## 2024-03-24 NOTE — THERAPY EVALUATION
Patient Name: Beckie Romeo  : 1945    MRN: [id number]                              Today's Date: 3/24/2024       Admit Date: 3/23/2024    Visit Dx:     ICD-10-CM ICD-9-CM   1. Acute respiratory failure with hypoxia and hypercapnia  J96.01 518.81    J96.02    2. Respiratory acidosis  E87.29 276.2   3. Pneumonia of right upper lobe due to infectious organism  J18.9 486   4. COPD with acute exacerbation  J44.1 491.21     Patient Active Problem List   Diagnosis    COPD (chronic obstructive pulmonary disease)    Hypocalcemia    Anemia    Hypothyroidism    Essential hypertension    GERD without esophagitis    Panic attack    Hematuria    COPD with exacerbation    Anxiety disorder due to known physiological condition     Past Medical History:   Diagnosis Date    Age-related physical debility     Allergic rhinitis     Anemia     Atherosclerotic heart disease of native coronary artery without angina pectoris     Cognitive communication deficit     Contact with and (suspected) exposure to covid-19     COPD (chronic obstructive pulmonary disease)     Cough     Depression     Diarrhea, unspecified     Disease of thyroid gland     Flatulence     GERD (gastroesophageal reflux disease)     Hyperlipidemia     Hypertension     Insomnia     Major depressive disorder with single episode     Nausea with vomiting     Other specified anxiety disorders     Paroxysmal A-fib     Polyneuropathy     Sciatica of left side     Sciatica of right side     Unspecified protein-calorie malnutrition      History reviewed. No pertinent surgical history.   General Information       Row Name 24 0938          Physical Therapy Time and Intention    Document Type evaluation  -MB     Mode of Treatment physical therapy  -MB       Row Name 24 0961          General Information    Patient Profile Reviewed yes  -MB     Prior Level of Function independent:;bed mobility;ADL's;transfer;gait;all household mobility  Patient limited historian;  reports she was independent w/ ADLs and household ambulation. Denies recent falls.  -MB     Existing Precautions/Restrictions fall;oxygen therapy device and L/min  -MB     Barriers to Rehab medically complex;previous functional deficit;cognitive status  -MB       Row Name 03/24/24 0938          Living Environment    People in Home facility resident  Per patient, she is a resident at [facility].  -MB       Row Name 03/24/24 0938          Home Main Entrance    Number of Stairs, Main Entrance none  -MB       Row Name 03/24/24 0938          Stairs Within Home, Primary    Number of Stairs, Within Home, Primary none  -MB       Row Name 03/24/24 0938          Cognition    Orientation Status (Cognition) oriented to;person;disoriented to;place;situation;time  -MB       Row Name 03/24/24 0938          Safety Issues, Functional Mobility    Safety Issues Affecting Function (Mobility) awareness of need for assistance;insight into deficits/self-awareness;judgment;positioning of assistive device;problem-solving;safety precaution awareness;safety precautions follow-through/compliance;sequencing abilities  -MB     Impairments Affecting Function (Mobility) balance;cognition;coordination;endurance/activity tolerance;postural/trunk control;shortness of breath;strength  -MB               User Key  (r) = Recorded By, (t) = Taken By, (c) = Cosigned By      Initials Name Provider Type    MB Leah Tellez, PT Physical Therapist                   Mobility       Row Name 03/24/24 2293          Bed Mobility    Bed Mobility supine-sit  -MB     Supine-Sit [place] (Bed Mobility) minimum assist (75% patient effort);verbal cues;nonverbal cues (demo/gesture)  -MB     Assistive Device (Bed Mobility) head of bed elevated;bed rails  -MB     Comment, (Bed Mobility) Pt. required assist to raise trunk/shoulders, cues for sequencing, and increased time to complete.  -MB       Row Name 03/24/24 5633          Transfers    Comment, (Transfers)  VCs/tactile cues for set up, safe hand placement, and trunk extension in standing.  -MB       Row Name 03/24/24 1523          Sit-Stand Transfer    Sit-Stand [place] (Transfers) minimum assist (75% patient effort);verbal cues;nonverbal cues (demo/gesture)  -MB     Assistive Device (Sit-Stand Transfers) walker, front-wheeled  -MB       Row Name 03/24/24 1523          Gait/Stairs (Locomotion)    Gait/Stairs Locomotion gait/ambulation independence;gait/ambulation assistive device;distance ambulated;gait pattern;gait deviations  -MB     [place] Level (Gait) minimum assist (75% patient effort);verbal cues;nonverbal cues (demo/gesture)  -MB     Assistive Device (Gait) walker, front-wheeled  -MB     Distance in Feet (Gait) 5  -MB     Pattern (Gait) step-to  -MB     Deviations/Abnormal Patterns (Gait) base of support, narrow;adam decreased;festinating/shuffling;gait speed decreased;stride length decreased  -MB     Bilateral Gait Deviations forward flexed posture;heel strike decreased  -MB     Gait Assessment/Intervention Pt. ambulated w/ step to, shuffling gait pattern w/ increased forward flexion and dec B step length. VCs for upright posture, increased B heelstrike, and safe use of RW.  -MB     [place] Level (Stairs) not tested  -MB               User Key  (r) = Recorded By, (t) = Taken By, (c) = Cosigned By      Initials Name Provider Type    Leah Flowers, PT Physical Therapist                   Obj/Interventions       Row Name 03/24/24 1526          Range of Motion Comprehensive    General Range of Motion bilateral lower extremity ROM WFL;upper extremity range of motion deficits identified  -MB     Comment, General Range of Motion dec B shoulder AROM  -MB       Row Name 03/24/24 1526          Strength Comprehensive (MMT)    General Manual Muscle Testing (MMT) Assessment upper extremity strength deficits identified;lower extremity strength deficits identified  -MB     Comment, General Manual  Muscle Testing (MMT) Assessment BLEs grossly 4-/5, BUEs WFL for purposes of eval  -MB       Row Name 03/24/24 1526          Balance    Balance Assessment sitting static balance;standing static balance;standing dynamic balance  -MB     Static Sitting Balance standby assist  -MB     Position, Sitting Balance unsupported;sitting edge of bed  -MB     Static Standing Balance contact guard  -MB     Dynamic Standing Balance contact guard  -MB     Position/Device Used, Standing Balance supported;walker, front-wheeled  -MB     Balance Interventions standing;sit to stand;occupation based/functional task;weight shifting activity;dynamic reaching  -MB       Row Name 03/24/24 1526          Sensory Assessment (Somatosensory)    Sensory Assessment (Somatosensory) LE sensation intact  -MB               User Key  (r) = Recorded By, (t) = Taken By, (c) = Cosigned By      Initials Name Provider Type    Leah Flowers, PT Physical Therapist                   Goals/Plan       Row Name 03/24/24 1530          Bed Mobility Goal 1 (PT)    Activity/Assistive Device (Bed Mobility Goal 1, PT) sit to supine/supine to sit  -MB     [place] Level/Cues Needed (Bed Mobility Goal 1, PT) independent  -MB     Time Frame (Bed Mobility Goal 1, PT) 10 days  -[facility] Name 03/24/24 1530          Transfer Goal 1 (PT)    Activity/Assistive Device (Transfer Goal 1, PT) sit-to-stand/stand-to-sit;bed-to-chair/chair-to-bed;walker, rolling  -MB     [place] Level/Cues Needed (Transfer Goal 1, PT) contact guard required  -MB     Time Frame (Transfer Goal 1, PT) 10 days  -MB       Row Name 03/24/24 1530          Gait Training Goal 1 (PT)    Activity/Assistive Device (Gait Training Goal 1, PT) gait (walking locomotion);walker, rolling  -MB     [place] Level (Gait Training Goal 1, PT) contact guard required  -MB     Distance (Gait Training Goal 1, PT) 100  -MB     Time Frame (Gait Training Goal 1, PT) 10 days  -[facility] Name 03/24/24  1530          Therapy Assessment/Plan (PT)    Planned Therapy Interventions (PT) balance training;bed mobility training;gait training;home exercise program;motor coordination training;patient/family education;postural re-education;strengthening;transfer training  -MB               User Key  (r) = Recorded By, (t) = Taken By, (c) = Cosigned By      Initials Name Provider Type    Leah Flowers, PT Physical Therapist                   Clinical Impression       Row Name 03/24/24 1527          Pain    Additional Documentation Pain Scale: FACES Pre/Post-Treatment (Group)  -MB       Row Name 03/24/24 1527          Pain Scale: FACES Pre/Post-Treatment    Pain: FACES Scale, Pretreatment 2-->hurts little bit  -MB     Posttreatment Pain Rating 4-->hurts little more  -MB     Pain Location - Side/Orientation Right  -MB     Pain Location - flank  -MB     Pre/Posttreatment Pain Comment RN aware and managing.  -MB       Row Name 03/24/24 1527          Plan of Care Review    Plan of Care Reviewed With patient  -MB     Progress no change  -MB     Outcome Evaluation PT eval completed. Patient presents w/ generalized weakness, impaired balance, gait instability, decreased functional endurance, and is below her baseline. She completed bed mobility and transfers w/ min A, and ambulated 5ft w/ RW, min A, and readily fatigued. Skilled IPPT warranted to improve pt's safety and independence w/ functional mobility. Recommend SNF rehab at D/C.  -MB       Row Name 03/24/24 1521          Therapy Assessment/Plan (PT)    Patient/Family Therapy Goals Statement (PT) Pt. did not state.  -MB     Rehab Potential (PT) fair, will monitor progress closely  -MB     Criteria for Skilled Interventions Met (PT) yes;meets criteria;skilled treatment is necessary  -MB     Therapy Frequency (PT) daily  -MB       Row Name 03/24/24 1528          Vital Signs    Pre Systolic BP Rehab 116  -MB     Pre Treatment Diastolic BP 54  -MB     Pretreatment Heart Rate  (beats/min) 65  -MB     Pre SpO2 (%) 99  -MB     O2 Delivery Pre Treatment nasal cannula  -MB     O2 Delivery Intra Treatment nasal cannula  -MB     Post SpO2 (%) 97  -MB     O2 Delivery Post Treatment nasal cannula  -MB     Pre Patient Position Supine  -MB     Intra Patient Position Standing  -MB     Post Patient Position Sitting  -MB       Row Name 03/24/24 1527          Positioning and Restraints    Pre-Treatment Position in bed  -MB     Post Treatment Position chair  -MB     In Chair notified nsg;reclined;call light within reach;encouraged to call for assist;exit alarm on;waffle cushion;legs elevated;heels elevated;with nsg  -MB               User Key  (r) = Recorded By, (t) = Taken By, (c) = Cosigned By      Initials Name Provider Type    Leah Flowers, PT Physical Therapist                   Outcome Measures       Row Name 03/24/24 1531          How much help from another person do you currently need...    Turning from your back to your side while in flat bed without using bedrails? 3  -MB     Moving from lying on back to sitting on the side of a flat bed without bedrails? 2  -MB     Moving to and from a bed to a chair (including a wheelchair)? 3  -MB     Standing up from a chair using your arms (e.g., wheelchair, bedside chair)? 3  -MB     Climbing 3-5 steps with a railing? 2  -MB     To walk in hospital room? 3  -MB     AM-PAC 6 Clicks Score (PT) 16  -MB     Highest Level of Mobility Goal 5 --> Static standing  -MB       Row Name 03/24/24 1531          Functional Assessment    Outcome Measure Options AM-PAC 6 Clicks Basic Mobility (PT)  -MB               User Key  (r) = Recorded By, (t) = Taken By, (c) = Cosigned By      Initials Name Provider Type    Leha Flowers, PT Physical Therapist                                 Physical Therapy Education       Title: PT OT SLP Therapies (In Progress)       Topic: Physical Therapy (In Progress)       Point: Mobility training (In Progress)        Learning Progress Summary             Patient Acceptance, E,D, NR by MB at 3/24/2024 1531                         Point: Home exercise program (In Progress)       Learning Progress Summary             Patient Acceptance, E,D, NR by MB at 3/24/2024 1531                         Point: Body mechanics (In Progress)       Learning Progress Summary             Patient Acceptance, E,D, NR by MB at 3/24/2024 1531                         Point: Precautions (In Progress)       Learning Progress Summary             Patient Acceptance, E,D, NR by MB at 3/24/2024 1531                                         User Key       Initials Effective Dates Name Provider Type Discipline    MB 06/16/21 -  Leah Tellez, PT Physical Therapist PT                  PT Recommendation and Plan  Planned Therapy Interventions (PT): balance training, bed mobility training, gait training, home exercise program, motor coordination training, patient/family education, postural re-education, strengthening, transfer training  Plan of Care Reviewed With: patient  Progress: no change  Outcome Evaluation: PT eval completed. Patient presents w/ generalized weakness, impaired balance, gait instability, decreased functional endurance, and is below her baseline. She completed bed mobility and transfers w/ min A, and ambulated 5ft w/ RW, min A, and readily fatigued. Skilled IPPT warranted to improve pt's safety and independence w/ functional mobility. Recommend SNF rehab at D/C.     Time Calculation:   PT Evaluation Complexity  History, PT Evaluation Complexity: 1-2 personal factors and/or comorbidities  Examination of Body Systems (PT Eval Complexity): total of 3 or more elements  Clinical Presentation (PT Evaluation Complexity): evolving  Clinical Decision Making (PT Evaluation Complexity): moderate complexity  Overall Complexity (PT Evaluation Complexity): moderate complexity     PT Charges       Row Name 03/24/24 1532             Time Calculation     Start Time 0938  -MB      PT Received On 03/24/24  -MB      PT Goal Re-Cert Due Date 04/03/24  -MB         Untimed Charges    PT Eval/Re-eval Minutes 47  -MB         Total Minutes    Untimed Charges Total Minutes 47  -MB       Total Minutes 47  -MB                User Key  (r) = Recorded By, (t) = Taken By, (c) = Cosigned By      Initials Name Provider Type    Leah Flowers, PT Physical Therapist                  Therapy Charges for Today       Code Description Service Date Service Provider Modifiers Qty    59335944334 HC PT EVAL MOD COMPLEXITY 4 3/24/2024 Leah Tellez, PT GP 1            PT G-Codes  Outcome Measure Options: AM-PAC 6 Clicks Basic Mobility (PT)  AM-PAC 6 Clicks Score (PT): 16  PT Discharge Summary  Anticipated Discharge Disposition (PT): skilled nursing facility    Leah Tellez, PT  3/24/2024

## 2024-03-25 LAB
ANION GAP SERPL CALCULATED.3IONS-SCNC: 6 MMOL/L (ref 5–15)
BASOPHILS # BLD AUTO: 0.02 10*3/MM3 (ref 0–0.2)
BASOPHILS NFR BLD AUTO: 0.2 % (ref 0–1.5)
BUN SERPL-MCNC: 26 MG/DL (ref 8–23)
BUN/CREAT SERPL: 38.2 (ref 7–25)
CALCIUM SPEC-SCNC: 8.7 MG/DL (ref 8.6–10.5)
CHLORIDE SERPL-SCNC: 106 MMOL/L (ref 98–107)
CO2 SERPL-SCNC: 32 MMOL/L (ref 22–29)
CREAT SERPL-MCNC: 0.68 MG/DL (ref 0.57–1)
DEPRECATED RDW RBC AUTO: 51.7 FL (ref 37–54)
EGFRCR SERPLBLD CKD-EPI 2021: 89.8 ML/MIN/1.73
EOSINOPHIL # BLD AUTO: 0 10*3/MM3 (ref 0–0.4)
EOSINOPHIL NFR BLD AUTO: 0 % (ref 0.3–6.2)
ERYTHROCYTE [DISTWIDTH] IN BLOOD BY AUTOMATED COUNT: 14 % (ref 12.3–15.4)
GLUCOSE SERPL-MCNC: 109 MG/DL (ref 65–99)
HCT VFR BLD AUTO: 28.8 % (ref 34–46.6)
HGB BLD-MCNC: 8.5 G/DL (ref 12–15.9)
IMM GRANULOCYTES # BLD AUTO: 0.11 10*3/MM3 (ref 0–0.05)
IMM GRANULOCYTES NFR BLD AUTO: 0.9 % (ref 0–0.5)
LYMPHOCYTES # BLD AUTO: 1.19 10*3/MM3 (ref 0.7–3.1)
LYMPHOCYTES NFR BLD AUTO: 9.4 % (ref 19.6–45.3)
MCH RBC QN AUTO: 29.5 PG (ref 26.6–33)
MCHC RBC AUTO-ENTMCNC: 29.5 G/DL (ref 31.5–35.7)
MCV RBC AUTO: 100 FL (ref 79–97)
MONOCYTES # BLD AUTO: 0.7 10*3/MM3 (ref 0.1–0.9)
MONOCYTES NFR BLD AUTO: 5.5 % (ref 5–12)
NEUTROPHILS NFR BLD AUTO: 10.63 10*3/MM3 (ref 1.7–7)
NEUTROPHILS NFR BLD AUTO: 84 % (ref 42.7–76)
NRBC BLD AUTO-RTO: 0 /100 WBC (ref 0–0.2)
PLATELET # BLD AUTO: 329 10*3/MM3 (ref 140–450)
PMV BLD AUTO: 9.8 FL (ref 6–12)
POTASSIUM SERPL-SCNC: 4.2 MMOL/L (ref 3.5–5.2)
RBC # BLD AUTO: 2.88 10*6/MM3 (ref 3.77–5.28)
SODIUM SERPL-SCNC: 144 MMOL/L (ref 136–145)
WBC NRBC COR # BLD AUTO: 12.65 10*3/MM3 (ref 3.4–10.8)

## 2024-03-25 PROCEDURE — 80048 BASIC METABOLIC PNL TOTAL CA: CPT | Performed by: INTERNAL MEDICINE

## 2024-03-25 PROCEDURE — 25010000002 HEPARIN (PORCINE) PER 1000 UNITS: Performed by: HOSPITALIST

## 2024-03-25 PROCEDURE — 94799 UNLISTED PULMONARY SVC/PX: CPT

## 2024-03-25 PROCEDURE — 25810000003 LACTATED RINGERS PER 1000 ML: Performed by: HOSPITALIST

## 2024-03-25 PROCEDURE — 94664 DEMO&/EVAL PT USE INHALER: CPT

## 2024-03-25 PROCEDURE — 99232 SBSQ HOSP IP/OBS MODERATE 35: CPT | Performed by: INTERNAL MEDICINE

## 2024-03-25 PROCEDURE — 97535 SELF CARE MNGMENT TRAINING: CPT

## 2024-03-25 PROCEDURE — 25010000002 CEFTRIAXONE PER 250 MG: Performed by: INTERNAL MEDICINE

## 2024-03-25 PROCEDURE — 97166 OT EVAL MOD COMPLEX 45 MIN: CPT

## 2024-03-25 PROCEDURE — 85025 COMPLETE CBC W/AUTO DIFF WBC: CPT | Performed by: INTERNAL MEDICINE

## 2024-03-25 PROCEDURE — 63710000001 PREDNISONE PER 1 MG: Performed by: INTERNAL MEDICINE

## 2024-03-25 RX ADMIN — PANTOPRAZOLE SODIUM 40 MG: 40 TABLET, DELAYED RELEASE ORAL at 09:21

## 2024-03-25 RX ADMIN — HEPARIN SODIUM 5000 UNITS: 5000 INJECTION INTRAVENOUS; SUBCUTANEOUS at 21:27

## 2024-03-25 RX ADMIN — Medication 400 UNITS: at 09:18

## 2024-03-25 RX ADMIN — FLUTICASONE PROPIONATE 2 SPRAY: 50 SPRAY, METERED NASAL at 09:16

## 2024-03-25 RX ADMIN — HEPARIN SODIUM 5000 UNITS: 5000 INJECTION INTRAVENOUS; SUBCUTANEOUS at 17:05

## 2024-03-25 RX ADMIN — HEPARIN SODIUM 5000 UNITS: 5000 INJECTION INTRAVENOUS; SUBCUTANEOUS at 05:48

## 2024-03-25 RX ADMIN — BUDESONIDE AND FORMOTEROL FUMARATE DIHYDRATE 2 PUFF: 160; 4.5 AEROSOL RESPIRATORY (INHALATION) at 18:28

## 2024-03-25 RX ADMIN — GABAPENTIN 300 MG: 300 CAPSULE ORAL at 09:21

## 2024-03-25 RX ADMIN — TRAMADOL HYDROCHLORIDE 50 MG: 50 TABLET ORAL at 11:50

## 2024-03-25 RX ADMIN — GABAPENTIN 300 MG: 300 CAPSULE ORAL at 21:27

## 2024-03-25 RX ADMIN — SODIUM CHLORIDE, POTASSIUM CHLORIDE, SODIUM LACTATE AND CALCIUM CHLORIDE 50 ML/HR: 600; 310; 30; 20 INJECTION, SOLUTION INTRAVENOUS at 05:51

## 2024-03-25 RX ADMIN — DOXYCYCLINE 100 MG: 100 CAPSULE ORAL at 21:28

## 2024-03-25 RX ADMIN — Medication 10 ML: at 21:28

## 2024-03-25 RX ADMIN — BUSPIRONE HYDROCHLORIDE 10 MG: 10 TABLET ORAL at 09:19

## 2024-03-25 RX ADMIN — Medication 250 MG: at 09:26

## 2024-03-25 RX ADMIN — Medication 10 ML: at 00:38

## 2024-03-25 RX ADMIN — ALPRAZOLAM 0.25 MG: 0.25 TABLET ORAL at 21:27

## 2024-03-25 RX ADMIN — MIRTAZAPINE 7.5 MG: 15 TABLET, FILM COATED ORAL at 21:27

## 2024-03-25 RX ADMIN — BUSPIRONE HYDROCHLORIDE 10 MG: 10 TABLET ORAL at 21:27

## 2024-03-25 RX ADMIN — DOXYCYCLINE 100 MG: 100 CAPSULE ORAL at 09:21

## 2024-03-25 RX ADMIN — IPRATROPIUM BROMIDE AND ALBUTEROL SULFATE 3 ML: 2.5; .5 SOLUTION RESPIRATORY (INHALATION) at 10:26

## 2024-03-25 RX ADMIN — GUAIFENESIN 600 MG: 600 TABLET, EXTENDED RELEASE ORAL at 21:27

## 2024-03-25 RX ADMIN — SODIUM CHLORIDE, POTASSIUM CHLORIDE, SODIUM LACTATE AND CALCIUM CHLORIDE 50 ML/HR: 600; 310; 30; 20 INJECTION, SOLUTION INTRAVENOUS at 18:43

## 2024-03-25 RX ADMIN — ASPIRIN 81 MG: 81 TABLET, COATED ORAL at 09:23

## 2024-03-25 RX ADMIN — ALPRAZOLAM 0.25 MG: 0.25 TABLET ORAL at 09:21

## 2024-03-25 RX ADMIN — PREDNISONE 40 MG: 20 TABLET ORAL at 09:21

## 2024-03-25 RX ADMIN — GUAIFENESIN 600 MG: 600 TABLET, EXTENDED RELEASE ORAL at 09:23

## 2024-03-25 RX ADMIN — GABAPENTIN 300 MG: 300 CAPSULE ORAL at 17:05

## 2024-03-25 RX ADMIN — LEVOTHYROXINE SODIUM 25 MCG: 25 TABLET ORAL at 05:48

## 2024-03-25 RX ADMIN — Medication 2000 UNITS: at 09:24

## 2024-03-25 RX ADMIN — SODIUM CHLORIDE 1000 MG: 900 INJECTION INTRAVENOUS at 09:17

## 2024-03-25 RX ADMIN — Medication 10 ML: at 09:26

## 2024-03-25 RX ADMIN — BUDESONIDE AND FORMOTEROL FUMARATE DIHYDRATE 2 PUFF: 160; 4.5 AEROSOL RESPIRATORY (INHALATION) at 10:26

## 2024-03-25 RX ADMIN — Medication 5 MG: at 21:27

## 2024-03-25 RX ADMIN — IPRATROPIUM BROMIDE AND ALBUTEROL SULFATE 3 ML: 2.5; .5 SOLUTION RESPIRATORY (INHALATION) at 18:28

## 2024-03-25 RX ADMIN — Medication 250 MG: at 21:28

## 2024-03-25 NOTE — CASE MANAGEMENT/SOCIAL WORK
Discharge Planning Assessment  [facility]     Patient Name: Beckie Romeo  MRN: [id number]  Today's Date: 3/25/2024    Admit Date: 3/23/2024    Plan: discharge plan   Discharge Needs Assessment       Row Name 03/25/24 1607       Living Environment    People in Home facility resident  Per Martine(pt is LTC resident) at [facility]    Primary Care Provided by other (see comments);self  Staff at [facility]    Provides Primary Care For no one, unable/limited ability to care for self    Family Caregiver if Needed child(gordy), adult    Family Caregiver Names Jessica Marlow(daughter)    Quality of Family Relationships helpful;involved;supportive    Able to Return to Prior Arrangements yes    Living Arrangement Comments I spoke with daughterJessica on cell with permission regarding discharge plan. Pt resides in [facility] at [facility]. She is a LTC resident at their facility       Transition Planning    Patient/Family Anticipates Transition to long-term care facility    Patient/Family Anticipated Services at Transition     Transportation Anticipated health plan transportation       Discharge Needs Assessment    Readmission Within the Last 30 Days no previous admission in last 30 days    Equipment Currently Used at Home walker, rolling;respiratory supplies;oxygen  Pt uses 2 L of O2 cont at [facility]    Concerns to be Addressed discharge planning    Equipment Needed After Discharge walker, rolling;oxygen;respiratory supplies  DME needed at facility    Discharge Facility/Level of Care Needs nursing facility, intermediate    Discharge Coordination/Progress Pt's daughter confirmed that pt has Medicare A and B. Pt is a long term care resident at [facility]. Daughter reports pt is mobile using a rolling walker. Pt is here with COPD exacerbation and wears 2 L of O2 cont at [facility]. Daughter reports pt is able to feed self but needs help bathing and dressing some days. Plan is back to [facility]. I did  confirm with Martine([facility] for [facility]) that pt has a bed hold. Per provider, pt may possibly be medically ready for discharge tomorrow and will need transportation. CM submitted a request through [facility] and will follow up tomorrow. I left a voice message with Martine([facility] for [facility]) regarding possible d/c tomorrow.                   Discharge Plan       Row Name 03/25/24 2804       Plan    Plan discharge plan    Plan Comments Per daughter, the plan is back to [facility]. Per provider, pt may possibly be medically ready for discharge tomorrow and will need transportation. CM submitted a request through [facility] and will follow up tomorrow. I left a voice message with Martine([facility] for [facility]) regarding possible d/c tomorrow.    Final Discharge Disposition Code 04 - intermediate care facility                  Continued Care and Services - Admitted Since 3/23/2024       Destination Coordination complete.      Service Provider Request Status Selected Services Address Phone Fax Patient Preferred    [facility]  [facility] Care [address] [place] [address] [place] [id number] [phone] --                  Selected Continued Care - Prior Encounters Includes continued care and service providers with selected services from prior encounters from 12/24/2023 to 3/25/2024      Discharged on 2/3/2024 Admission date: 2/2/2024 - Discharge disposition: Long Term Care (DC - External)      Destination       Service Provider Selected Services Address Phone Fax Patient Preferred    [facility] [address] [place] [address] [place] [facility] [id number] [phone] --                          Expected Discharge Date and Time       Expected Discharge Date Expected Discharge Time    Mar 26, 2024            Demographic Summary       Row Name 03/25/24 1602       General Information    General Information Comments Pt sees a provider that does to [facility]       Contact  Information    Permission Granted to Share Info With     Contact Information Obtained for     Contact Information Comments Jessica Marlow(daughter) [phone]                   Functional Status    No documentation.                  Psychosocial    No documentation.                  Abuse/Neglect    No documentation.                  Legal    No documentation.                  Substance Abuse    No documentation.                  Patient Forms    No documentation.                     Karina Carvalho RN

## 2024-03-25 NOTE — CONSULTS
Referring Provider: MD Tushar  Reason for Consultation: AECOPD    Subjective .   Education: NN spoke with pt at BS.  Pt alert and able to answer questions appropriately.  Pt O2 sat  100% on 2  L currently, home O2 use 2 L.  Pt reports no ability to ambulate at baseline before experiencing SOB.  Pt states unclear use of rescue medication.  Patient is up to date on COVID and flu vaccines but not PNA vaccines.  Former smoker, quit date 2012.  Pt reports no issues at this time with medications or transportation for appointments.  Pt reports no previous hx of formal COPD teaching, no understanding of action plan, or DE.  Stop light report, instructions for accessing [facility] and list of educational videos to be given to pt.  1800QUITNOW reference sheet to be discussed and given to patient at BS.  COPD education began in the form of explanation, handouts, and videos.  No new concerns or questions voiced at this time.  NN will continue to follow as needed.     Age: 78 y.o.  Sex: female  Smoker Status: former, pack years unclear  Pulmonologist: NA  FEV1 (PFT): NA  Home O2: 2L    Objective     SpO2 SpO2: 100 % (03/25/24 1026)  Device Device (Oxygen Therapy): nasal cannula (03/25/24 1026)  Flow Flow (L/min): 1 (03/25/24 1026)  Incentive Spirometer    IS Predicted Level (mL)     Number of Repetitions     Level Incentive Spirometer (mL)    Patient Tolerance     Inhaler Treatment Status Respiratory Treatment Status (Inhaler): given (03/25/24 1026)  Treatment Route Respiratory Treatment Status (Inhaler): given (03/25/24 1026)      Home Medications:  Medications Prior to Admission   Medication Sig Dispense Refill Last Dose    acetaminophen (TYLENOL) 325 MG tablet Take 2 tablets by mouth 2 (Two) Times a Day.   3/23/2024    ALPRAZolam (XANAX) 0.25 MG tablet Take 1 tablet by mouth 2 (Two) Times a Day.   3/23/2024    Artificial Tear Solution (GenTeal Tears) 0.1-0.2-0.3 % solution Apply 2 drops to eye(s) as directed by provider 3 (Three)  Times a Day. 1-2 drops in bilateral eyes   3/23/2024    aspirin 81 MG EC tablet Take 1 tablet by mouth Daily.   3/23/2024    baclofen (LIORESAL) 10 MG tablet Take 1 tablet by mouth 3 (Three) Times a Day As Needed for Muscle Spasms.   Past Month    busPIRone (BUSPAR) 5 MG tablet Take 2 tablets by mouth 2 (Two) Times a Day. 60 tablet 0 3/23/2024    carvedilol (COREG) 12.5 MG tablet Take 1 tablet by mouth 2 (Two) Times a Day With Meals. Hold if BP less than 110/60 or HR <60/   3/23/2024    cholecalciferol 25 MCG (1000 UT) tablet Take 2 tablets by mouth Daily.   3/23/2024    ciprofloxacin (CIPRO) 500 MG tablet Take 1 tablet by mouth Daily.   Past Month    Diclofenac Sodium (VOLTAREN) 1 % gel gel Apply 2 g topically to the appropriate area as directed 4 (Four) Times a Day As Needed (left hand pain).   Past Month    fluticasone (FLONASE) 50 MCG/ACT nasal spray 2 sprays into the nostril(s) as directed by provider Daily.   3/23/2024    Fluticasone Furoate-Vilanterol (Breo Ellipta) 200-25 MCG/ACT inhaler Inhale 1 puff Daily.   3/23/2024    gabapentin (NEURONTIN) 300 MG capsule Take 1 capsule by mouth 3 (Three) Times a Day.   3/23/2024    GUAIFENESIN ER PO Take 600 mg by mouth 2 (Two) Times a Day.   3/23/2024    levothyroxine (SYNTHROID, LEVOTHROID) 25 MCG tablet Take 1 tablet by mouth Every Morning.   3/23/2024    Loratadine (CLARITIN PO) Take 10 mg by mouth Every Night.   Past Week    magnesium oxide (MAG-OX) 400 MG tablet Take 1 tablet by mouth Daily.   3/23/2024    melatonin 1 MG tablet Take 2 tablets by mouth Every Night.   Past Week    mirtazapine (REMERON) 15 MG tablet Take 0.5 tablets by mouth Every Night.   Past Week    pantoprazole (PROTONIX) 40 MG EC tablet Take 1 tablet by mouth Daily.   3/23/2024    POTASSIUM & SODIUM PHOSPHATES PO Take 250 mg by mouth Daily. 250mg - 45 mg - 298 mg tablet   3/23/2024    sertraline (ZOLOFT) 100 MG tablet Take 1 tablet by mouth Daily.   3/23/2024    traMADol (ULTRAM) 50 MG tablet  Take 1 tablet by mouth Every 6 (Six) Hours As Needed for Moderate Pain.   Past Week    VITAMIN E 400 UNIT capsule Take 1 capsule by mouth Daily.   3/23/2024    albuterol sulfate  (90 Base) MCG/ACT inhaler Inhale 2 puffs Every 6 (Six) Hours As Needed for Wheezing.   Unknown    ipratropium-albuterol (DUO-NEB) 0.5-2.5 mg/3 ml nebulizer Take 3 mL by nebulization 3 (Three) Times a Day As Needed (cough, congestion).   More than a month    loperamide (IMODIUM) 2 MG capsule Take 1 capsule by mouth 4 (Four) Times a Day As Needed for Diarrhea. Give 2 tabs after 1st loose stool; give 1 tablet for any loose stools after. Max dose 4 tablets within a 24 hr period.   More than a month    ondansetron (ZOFRAN) 4 MG tablet Take 1 tablet by mouth 4 (Four) Times a Day As Needed for Nausea or Vomiting.   More than a month       Discussion: Per current GOLD Standards, please consider:  No LAMA in place, LABA/ICS in place (Breo), Outpatient PFT, Rehab as appropriate, Palliative Care consult, Annual LDCT per current screening guidelines (age 50-80 years old, smoking history of 20 pack years or more or has quit within past 15 years)           Yuliana Peterson RN

## 2024-03-25 NOTE — PLAN OF CARE
Goal Outcome Evaluation:  Plan of Care Reviewed With: patient           Outcome Evaluation: OT initial eval completed. Pt presents with multiple comorbidities and decreased strength, balance, activity tolerance limiting independence with ADL's and mobility from baseline status. Recommend continued skilled OT services and transfer to SNF at d/c if deemed medically appropriate.      Anticipated Discharge Disposition (OT): skilled nursing facility

## 2024-03-25 NOTE — PROGRESS NOTES
[facility] Medicine Services  PROGRESS NOTE    Patient Name: Beckie Romeo  : 1945  MRN: [id number]    Date of Admission: 3/23/2024  Primary Care Physician: Provider, No Known    Subjective   Subjective     CC:  F/U dyspnea    HPI:  Doing a little better today.      Objective   Objective     Vital Signs:   Temp:  [97.5 °F (36.4 °C)-98.1 °F (36.7 °C)] 97.7 °F (36.5 °C)  Heart Rate:  [49-83] 67  Resp:  [16-20] 16  BP: (107-123)/(47-61) 123/61  Flow (L/min):  [2-3] 2     Physical Exam:  Constitutional: No acute distress, awake, alert, sitting up in chair  HENT: NCAT, mucous membranes moist  Respiratory: Clear to auscultation bilaterally, respiratory effort normal   Cardiovascular: RRR, no murmurs  Musculoskeletal: No bilateral ankle edema  Psychiatric: Appropriate affect, cooperative  Neurologic: Speech clear  Skin: No rashes on exposed surfaces    Results Reviewed:  LAB RESULTS:      Lab 24  04524  0724  1703   WBC 12.65* 12.10* 24.30*   HEMOGLOBIN 8.5* 9.0* 8.7*   HEMATOCRIT 28.8* 31.3* 29.9*   PLATELETS 329 305 314   NEUTROS ABS 10.63*  --  20.82*   IMMATURE GRANS (ABS) 0.11*  --  0.26*   LYMPHS ABS 1.19  --  1.32   MONOS ABS 0.70  --  1.53*   EOS ABS 0.00  --  0.33   .0* 98.7* 101.7*   PROCALCITONIN  --   --  0.43*   LACTATE  --   --  1.0         Lab 24  0451 24  0719 24  1703   SODIUM 144 142 136   POTASSIUM 4.2 4.1 3.7   CHLORIDE 106 103 96*   CO2 32.0* 32.0* 37.0*   ANION GAP 6.0 7.0 3.0*   BUN 26* 28* 36*   CREATININE 0.68 0.65 0.72   EGFR 89.8 90.8 86.2   GLUCOSE 109* 121* 106*   CALCIUM 8.7 8.4* 9.2   MAGNESIUM  --   --  2.1   TSH  --   --  0.979         Lab 24  0719 24  1703   TOTAL PROTEIN 5.4* 6.5   ALBUMIN 3.0* 3.1*   GLOBULIN 2.4 3.4   ALT (SGPT) 12 13   AST (SGOT) 20 22   BILIRUBIN 0.2 0.2   ALK PHOS 113 121*         Lab 24  1703   PROBNP 1,082.0   HSTROP T 7                 Lab 24  1712   PH,  ARTERIAL 7.324*   PCO2, ARTERIAL 66.7*   PO2 ART 70.3*   FIO2 28   HCO3 ART 34.7*   BASE EXCESS ART 7.2*   CARBOXYHEMOGLOBIN 1.2     Brief Urine Lab Results  (Last result in the past 365 days)        Color   Clarity   Blood   Leuk Est   Nitrite   Protein   CREAT   Urine HCG        03/23/24 1901 Yellow   Turbid   Moderate (2+)   Moderate (2+)   Negative   100 mg/dL (2+)                   Microbiology Results Abnormal       Procedure Component Value - Date/Time    Blood Culture - Blood, Arm, Left [[id number]]  (Normal) Collected: 03/23/24 1733    Lab Status: Preliminary result Specimen: Blood from Arm, Left Updated: 03/24/24 1745     Blood Culture No growth at 24 hours    Narrative:      Aerobic Bottle Only    Less than seven (7) mL's of blood was collected.  Insufficient quantity may yield false negative results.    Blood Culture - Blood, Arm, Right [[id number]]  (Normal) Collected: 03/23/24 1703    Lab Status: Preliminary result Specimen: Blood from Arm, Right Updated: 03/24/24 1716     Blood Culture No growth at 24 hours    MRSA Screen, PCR (Inpatient) - Swab, Nares [[id number]]  (Normal) Collected: 03/23/24 1843    Lab Status: Final result Specimen: Swab from Nares Updated: 03/23/24 2010     MRSA PCR Negative    Narrative:      The negative predictive value of this diagnostic test is high and should only be used to consider de-escalating anti-MRSA therapy. A positive result may indicate colonization with MRSA and must be correlated clinically.  MRSA Negative    COVID PRE-OP / PRE-PROCEDURE SCREENING ORDER (NO ISOLATION) - Swab, Nasopharynx [[id number]]  (Normal) Collected: 03/23/24 1736    Lab Status: Final result Specimen: Swab from Nasopharynx Updated: 03/23/24 1845    Narrative:      The following orders were created for panel order COVID PRE-OP / PRE-PROCEDURE SCREENING ORDER (NO ISOLATION) - Swab, Nasopharynx.  Procedure                               Abnormality         Status                     ---------                                -----------         ------                     Respiratory Panel PCR w/...[[id number]]  Normal              Final result                 Please view results for these tests on the individual orders.    Respiratory Panel PCR w/COVID-19(SARS-CoV-2) [facility] In-House, NP Swab in UTM/VTM, 2 HR TAT - Swab, Nasopharynx [[id number]]  (Normal) Collected: 03/23/24 1736    Lab Status: Final result Specimen: Swab from Nasopharynx Updated: 03/23/24 3546     ADENOVIRUS, PCR Not Detected     Coronavirus 229E Not Detected     Coronavirus HKU1 Not Detected     Coronavirus NL63 Not Detected     Coronavirus OC43 Not Detected     COVID19 Not Detected     Human Metapneumovirus Not Detected     Human Rhinovirus/Enterovirus Not Detected     Influenza A PCR Not Detected     Influenza B PCR Not Detected     Parainfluenza Virus 1 Not Detected     Parainfluenza Virus 2 Not Detected     Parainfluenza Virus 3 Not Detected     Parainfluenza Virus 4 Not Detected     RSV, PCR Not Detected     Bordetella pertussis pcr Not Detected     Bordetella parapertussis PCR Not Detected     Chlamydophila pneumoniae PCR Not Detected     Mycoplasma pneumo by PCR Not Detected    Narrative:      In the setting of a positive respiratory panel with a viral infection PLUS a negative procalcitonin without other underlying concern for bacterial infection, consider observing off antibiotics or discontinuation of antibiotics and continue supportive care. If the respiratory panel is positive for atypical bacterial infection (Bordetella pertussis, Chlamydophila pneumoniae, or Mycoplasma pneumoniae), consider antibiotic de-escalation to target atypical bacterial infection.            CT Chest Without Contrast Diagnostic    Result Date: 3/23/2024  CT CHEST WO CONTRAST DIAGNOSTIC Date of Exam: 3/23/2024 7:09 PM EDT Indication: abnormal CXR. Comparison: Plain film obtained on the same date Technique: Axial CT images were obtained of the  chest without contrast administration.  Reconstructed coronal and sagittal images were also obtained. Automated exposure control and iterative construction methods were used. Findings: Right upper lobe masslike opacity measuring 4.4 cm x 3.3 cm. Surrounding infiltration concerning for developing pneumonia within the right upper lobe. 0.8 cm right lower lobe nodule on image #70. Dependent atelectatic changes bilaterally. Pleural-based calcifications noted on the left. Volume loss in the left lower lobe indicative of underlying atelectasis. Partially calcified peripheral atelectatic changes in the left upper lobe. 1.1 cm x 0.6 cm left upper lobe nodule. 0.6 cm left upper lobe nodule on axial image #39. No pneumothorax or pleural effusion. The thyroid gland is normal. The subglottic airway is clear. Severe atheromatous disease of the coronary vessels. No pericardial effusion. Right hilar calcified lymph node consistent with prior granulomatous disease. Extensive calcified granulomata within the liver and spleen. Medial left renal cyst. Thoracic vertebral body height and alignment are normal. Scoliotic curvature of the spine. The spinous processes are intact.     Impression: Rounded right upper lobe masslike opacity with surrounding infiltrate. This is concerning for an underlying mass. Differential considerations do include focal pneumonia. The surrounding infiltrate is concerning for developing pneumonia. Repeat plain film posttreatment recommended. Bilateral pulmonary nodules are also present. PET/CT and/or biopsy recommended.. Electronically Signed: Clive Reyes MD  3/23/2024 7:20 PM EDT  Workstation ID: [id number]    XR Chest 2 View    Result Date: 3/23/2024  XR CHEST 1 VW Date of Exam: 3/23/2024 5:06 PM EDT Indication: dyspnea, wheezing, recently finished abx for pneumonia Comparison: AP portable March 23, 2024 Findings: There is abnormal airspace disease in right upper lobe. This has developed since February  suggesting this could reflect pneumonia.     Impression: Impression: 1.Airspace disease right upper lobe better defined on the AP portable obtained earlier which could be secondary to pneumonia given the interval change since February. Follow-up will be warranted to document resolution. Electronically Signed: Cesar French MD  3/23/2024 5:29 PM EDT  Workstation ID: [id number]    XR Chest 1 View    Result Date: 3/23/2024  XR CHEST 1 VW Date of Exam: 3/23/2024 4:44 PM EDT Indication: SOA triage protocol Comparison: 3/2/2024 Findings: Heart size normal. New consolidation overlying the right upper lung concerning for pneumonia. Blunting of the left ankle stable from the prior study which may relate to small left pleural effusion or scarring. No pneumothorax. Dense aortic arch atherosclerosis. Convex right thoracic dextrocurvature.     Impression: Impression: 1. Right upper lobe pneumonia. Recommend follow-up to ensure resolution. 2. Stable blunting of the left lateral costophrenic angle which may relate to scarring and/or small left effusion. Electronically Signed: Baljit Smith MD  3/23/2024 4:59 PM EDT  Workstation ID: [id number]         Current medications:  Scheduled Meds:ALPRAZolam, 0.25 mg, Oral, BID  aspirin, 81 mg, Oral, Daily  budesonide-formoterol, 2 puff, Inhalation, BID - RT  busPIRone, 10 mg, Oral, BID  carvedilol, 12.5 mg, Oral, BID With Meals  cefTRIAXone, 1,000 mg, Intravenous, Q24H  cholecalciferol, 2,000 Units, Oral, Daily  doxycycline, 100 mg, Oral, Q12H  fluticasone, 2 spray, Nasal, Daily  gabapentin, 300 mg, Oral, TID  guaiFENesin, 600 mg, Oral, BID  heparin (porcine), 5,000 Units, Subcutaneous, Q8H  ipratropium-albuterol, 3 mL, Nebulization, Q6H - RT  levothyroxine, 25 mcg, Oral, Q AM  melatonin, 5 mg, Oral, Nightly  mirtazapine, 7.5 mg, Oral, Nightly  pantoprazole, 40 mg, Oral, Daily  predniSONE, 40 mg, Oral, Daily With Breakfast  saccharomyces boulardii, 250 mg, Oral, BID  sertraline, 100 mg, Oral,  Daily  sodium chloride, 10 mL, Intravenous, Q12H  vitamin E, 400 Units, Oral, Daily      Continuous Infusions:lactated ringers, 50 mL/hr, Last Rate: 50 mL/hr (03/25/24 0551)      PRN Meds:.  baclofen    senna-docusate sodium **AND** polyethylene glycol **AND** bisacodyl **AND** bisacodyl    Calcium Replacement - Follow Nurse / BPA Driven Protocol    ipratropium-albuterol    Magnesium Standard Dose Replacement - Follow Nurse / BPA Driven Protocol    ondansetron ODT    Phosphorus Replacement - Follow Nurse / BPA Driven Protocol    Potassium Replacement - Follow Nurse / BPA Driven Protocol    sodium chloride    sodium chloride    traMADol    Assessment & Plan   Assessment & Plan     Active Hospital Problems    Diagnosis  POA    **COPD with exacerbation [J44.1]  Yes    Anxiety disorder due to known physiological condition [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        Brief Hospital Course to date:  Beckie Romeo is a 78 y.o. female with COPD on 2L O2, anxiety, HTN, HLD, who presented with dyspnea, cough, congestion x 1 week.  She was admitted for RUL pneumonia and COPD exacerbation.    This patient's problems and plans were partially entered by my partner and updated as appropriate by me 03/25/24.     RUL pneumonia  COPD with acute exacerbation  Chronic respiratory failure with hypoxia  Possible RUL mass  -Blood cultures pending, obtain sputum culture if we are able  -Ceftriaxone and doxycycline  -S/P IV solumedrol; transition to PO prednisone  -Needs referral to lung nodule clinic at discharge for further evaluation of concerning RUL findings once she has completed treatment for pneumonia.  I discussed with patient.  Attempted to call her daughter to let her know but was not able to reach her.  I will go ahead and place referral to lung  nodule clinic in [facility].    Possible UTI  -Urine culture in progress  -Continue ceftriaxone    Anemia  -Monitor    HTN  -Continue carvedilol    Expected Discharge Location and Transportation: Back to [place] tomorrow if stable  Expected Discharge   Expected Discharge Date: 3/26/2024; Expected Discharge Time:      DVT prophylaxis:  Medical DVT prophylaxis orders are present.         AM-PAC 6 Clicks Score (PT): 16 (03/24/24 1531)    CODE STATUS:   Code Status and Medical Interventions:   Ordered at: 03/23/24 1900     Code Status (Patient has no pulse and is not breathing):    CPR (Attempt to Resuscitate)     Medical Interventions (Patient has pulse or is breathing):    Full Support       Shirin Finley MD  03/25/24

## 2024-03-25 NOTE — THERAPY EVALUATION
Patient Name: Beckie Romeo  : 1945    MRN: [id number]                              Today's Date: 3/25/2024       Admit Date: 3/23/2024    Visit Dx:     ICD-10-CM ICD-9-CM   1. Acute respiratory failure with hypoxia and hypercapnia  J96.01 518.81    J96.02    2. Respiratory acidosis  E87.29 276.2   3. Pneumonia of right upper lobe due to infectious organism  J18.9 486   4. COPD with acute exacerbation  J44.1 491.21     Patient Active Problem List   Diagnosis    COPD (chronic obstructive pulmonary disease)    Hypocalcemia    Anemia    Hypothyroidism    Essential hypertension    GERD without esophagitis    Panic attack    Hematuria    COPD with exacerbation    Anxiety disorder due to known physiological condition     Past Medical History:   Diagnosis Date    Age-related physical debility     Allergic rhinitis     Anemia     Atherosclerotic heart disease of native coronary artery without angina pectoris     Cognitive communication deficit     Contact with and (suspected) exposure to covid-19     COPD (chronic obstructive pulmonary disease)     Cough     Depression     Diarrhea, unspecified     Disease of thyroid gland     Flatulence     GERD (gastroesophageal reflux disease)     Hyperlipidemia     Hypertension     Insomnia     Major depressive disorder with single episode     Nausea with vomiting     Other specified anxiety disorders     Paroxysmal A-fib     Polyneuropathy     Sciatica of left side     Sciatica of right side     Unspecified protein-calorie malnutrition      History reviewed. No pertinent surgical history.   General Information       Row Name 24 1130          OT Time and Intention    Document Type evaluation  -JR     Mode of Treatment occupational therapy  -JR       Row Name 24 1137          General Information    Patient Profile Reviewed yes  -JR     Prior Level of Function independent:;gait;transfer;bed mobility;ADL's;all household mobility  Pt is limited historian. Reports she  is independent with ADL's and mobility. Reports she mostly stays in her room, does not go to dining room.  -     Existing Precautions/Restrictions fall;oxygen therapy device and L/min  -     Barriers to Rehab medically complex;previous functional deficit;cognitive status  -       Row Name 24 1130          Living Environment    People in Home facility resident  -       Row Name 24 1130          Home Main Entrance    Number of Stairs, Main Entrance none  -       Row Name 24 1130          Stairs Within Home, Primary    Number of Stairs, Within Home, Primary none  -JR       Row Name 24 1130          Cognition    Orientation Status (Cognition) oriented to;person;place  Pt oriented to name,  and hospital. Required choices for name of hospital, oriented to month, not year.  -       Row Name 24 1130          Safety Issues, Functional Mobility    Safety Issues Affecting Function (Mobility) awareness of need for assistance;insight into deficits/self-awareness;safety precaution awareness;safety precautions follow-through/compliance;problem-solving  -     Impairments Affecting Function (Mobility) balance;cognition;endurance/activity tolerance;shortness of breath;strength;postural/trunk control  -     Cognitive Impairments, Mobility Safety/Performance awareness, need for assistance;insight into deficits/self-awareness;problem-solving/reasoning;safety precaution awareness;safety precaution follow-through  -               User Key  (r) = Recorded By, (t) = Taken By, (c) = Cosigned By      Initials Name Provider Type    JR Lillian Gibson OT Occupational Therapist                     Mobility/ADL's       Row Name 24 1133          Bed Mobility    Bed Mobility supine-sit  -JR     Supine-Sit [place] (Bed Mobility) standby assist;verbal cues  -     Assistive Device (Bed Mobility) head of bed elevated  -     Comment, (Bed Mobility) Pt required increased time and verbal  cues for supine to sit on EOB.  -       Row Name 03/25/24 1133          Transfers    Transfers sit-stand transfer;bed-chair transfer  -       Row Name 03/25/24 1133          Bed-Chair Transfer    Bed-Chair [place] (Transfers) contact guard;verbal cues  -JR     Assistive Device (Bed-Chair Transfers) walker, front-wheeled  -     Comment, (Bed-Chair Transfer) Pt required increased time and verbal cues for hand placement and safety with RWx.  -       Row Name 03/25/24 1133          Sit-Stand Transfer    Sit-Stand [place] (Transfers) contact guard;verbal cues  -JR     Assistive Device (Sit-Stand Transfers) walker, front-wheeled  -JR       Row Name 03/25/24 1133          Activities of Daily Living    BADL Assessment/Intervention grooming  -[facility] Name 03/25/24 1133          Grooming Assessment/Training    [place] Level (Grooming) wash face, hands;oral care regimen;minimum assist (75% patient effort);verbal cues  -     Position (Grooming) supported sitting  -     Comment, (Grooming) Pt required set up to wash face, required assist with opening packages with brushing teeth. Able to apply lip balm to lips, required assist to load lip balm on swab.  -               User Key  (r) = Recorded By, (t) = Taken By, (c) = Cosigned By      Initials Name Provider Type    Lillian Ybarra, OT Occupational Therapist                   Obj/Interventions       Row Name 03/25/24 1135          Sensory Assessment (Somatosensory)    Sensory Assessment (Somatosensory) UE sensation intact  -[facility] Name 03/25/24 1135          Range of Motion Comprehensive    General Range of Motion bilateral upper extremity ROM WFL  -[facility] Name 03/25/24 1135          Strength Comprehensive (MMT)    Comment, General Manual Muscle Testing (MMT) Assessment B UE functionally 4-/5  -[facility] Name 03/25/24 1135          Balance    Balance Assessment sitting static balance;standing dynamic balance  -     Static  Sitting Balance standby assist  -JR     Dynamic Standing Balance contact guard;verbal cues  -JR     Position/Device Used, Standing Balance walker, front-wheeled  -JR               User Key  (r) = Recorded By, (t) = Taken By, (c) = Cosigned By      Initials Name Provider Type    Lillian Ybarra, OT Occupational Therapist                   Goals/Plan       Row Name 03/25/24 1139          Bed Mobility Goal 1 (OT)    Activity/Assistive Device (Bed Mobility Goal 1, OT) bed mobility activities, all  -JR     [place] Level/Cues Needed (Bed Mobility Goal 1, OT) modified independence;verbal cues required  -JR     Time Frame (Bed Mobility Goal 1, OT) long term goal (LTG);by discharge  -JR     Progress/Outcomes (Bed Mobility Goal 1, OT) goal ongoing  -       Row Name 03/25/24 1139          Transfer Goal 1 (OT)    Activity/Assistive Device (Transfer Goal 1, OT) transfers, all  -JR     [place] Level/Cues Needed (Transfer Goal 1, OT) modified independence;verbal cues required  -JR     Time Frame (Transfer Goal 1, OT) long term goal (LTG);by discharge  -JR     Progress/Outcome (Transfer Goal 1, OT) goal ongoing  -       Row Name 03/25/24 1139          Dressing Goal 1 (OT)    Activity/Device (Dressing Goal 1, OT) lower body dressing  -JR     [place]/Cues Needed (Dressing Goal 1, OT) minimum assist (75% or more patient effort);verbal cues required  -JR     Time Frame (Dressing Goal 1, OT) long term goal (LTG);by discharge  -JR     Progress/Outcome (Dressing Goal 1, OT) goal ongoing  -       Row Name 03/25/24 1139          Toileting Goal 1 (OT)    Activity/Device (Toileting Goal 1, OT) toileting skills, all  -JR     [place] Level/Cues Needed (Toileting Goal 1, OT) minimum assist (75% or more patient effort);verbal cues required  -JR     Time Frame (Toileting Goal 1, OT) long term goal (LTG);by discharge  -JR     Progress/Outcome (Toileting Goal 1, OT) goal ongoing  -       Row Name 03/25/24 1131           Therapy Assessment/Plan (OT)    Planned Therapy Interventions (OT) activity tolerance training;adaptive equipment training;BADL retraining;neuromuscular control/coordination retraining;patient/caregiver education/training;transfer/mobility retraining;strengthening exercise;ROM/therapeutic exercise;occupation/activity based interventions;functional balance retraining  -JR               User Key  (r) = Recorded By, (t) = Taken By, (c) = Cosigned By      Initials Name Provider Type    JR Lillian Gibson, OT Occupational Therapist                   Clinical Impression       Row Name 03/25/24 1136          Pain Assessment    Pretreatment Pain Rating 0/10 - no pain  -JR     Posttreatment Pain Rating 0/10 - no pain  -JR       Row Name 03/25/24 1136          Plan of Care Review    Plan of Care Reviewed With patient  -JR     Outcome Evaluation OT initial eval completed. Pt presents with multiple comorbidities and decreased strength, balance, activity tolerance limiting independence with ADL's and mobility from baseline status. Recommend continued skilled OT services and transfer to SNF at d/c if deemed medically appropriate.  -       Row Name 03/25/24 1136          Therapy Assessment/Plan (OT)    Patient/Family Therapy Goal Statement (OT) go home  -JR     Rehab Potential (OT) good, to achieve stated therapy goals  -     Criteria for Skilled Therapeutic Interventions Met (OT) yes;meets criteria;skilled treatment is necessary  -JR     Therapy Frequency (OT) daily  -JR       Row Name 03/25/24 1136          Therapy Plan Review/Discharge Plan (OT)    Anticipated Discharge Disposition (OT) [facility] nursing facility  -       Row Name 03/25/24 1136          Vital Signs    Pre Systolic BP Rehab 118  -JR     Pre Treatment Diastolic BP 47  -JR     Post Systolic BP Rehab 113  -JR     Post Treatment Diastolic BP 58  -JR     Pretreatment Heart Rate (beats/min) 63  -JR     Posttreatment Heart Rate (beats/min) 695  -JR     Pre  SpO2 (%) 97  -JR     O2 Delivery Pre Treatment nasal cannula  -JR     Post SpO2 (%) 93  -JR     O2 Delivery Post Treatment nasal cannula  -JR     Pre Patient Position Supine  -JR     Intra Patient Position Standing  -JR     Post Patient Position Sitting  -JR       Row Name 03/25/24 1136          Positioning and Restraints    Pre-Treatment Position in bed  -JR     Post Treatment Position chair  -JR     In Chair notified nsg;reclined;call light within reach;encouraged to call for assist;exit alarm on;waffle cushion;on mechanical lift sling;LUE elevated  -JR               User Key  (r) = Recorded By, (t) = Taken By, (c) = Cosigned By      Initials Name Provider Type    Lillian Ybarra OT Occupational Therapist                   Outcome Measures       Row Name 03/25/24 1139          How much help from another is currently needed...    Putting on and taking off regular lower body clothing? 1  -JR     Bathing (including washing, rinsing, and drying) 2  -JR     Toileting (which includes using toilet bed pan or urinal) 1  -JR     Putting on and taking off regular upper body clothing 2  -JR     Taking care of personal grooming (such as brushing teeth) 3  -JR     Eating meals 4  -JR     AM-PAC 6 Clicks Score (OT) 13  -JR       Row Name 03/25/24 1139          Functional Assessment    Outcome Measure Options AM-PAC 6 Clicks Daily Activity (OT)  -JR               User Key  (r) = Recorded By, (t) = Taken By, (c) = Cosigned By      Initials Name Provider Type    Lillian Ybarra OT Occupational Therapist                    Occupational Therapy Education       Title: PT OT SLP Therapies (In Progress)       Topic: Occupational Therapy (In Progress)       Point: ADL training (Done)       Description:   Instruct learner(s) on proper safety adaptation and remediation techniques during self care or transfers.   Instruct in proper use of assistive devices.                  Learning Progress Summary             Patient  Acceptance, E, VU,NR by  at 3/25/2024 1050    Comment: role of therapy                         Point: Home exercise program (Done)       Description:   Instruct learner(s) on appropriate technique for monitoring, assisting and/or progressing therapeutic exercises/activities.                  Learning Progress Summary             Patient Acceptance, E, VU,NR by  at 3/25/2024 1050    Comment: role of therapy                         Point: Precautions (Not Started)       Description:   Instruct learner(s) on prescribed precautions during self-care and functional transfers.                  Learner Progress:  Not documented in this visit.              Point: Body mechanics (Not Started)       Description:   Instruct learner(s) on proper positioning and spine alignment during self-care, functional mobility activities and/or exercises.                  Learner Progress:  Not documented in this visit.                              User Key       Initials Effective Dates Name Provider Type Discipline     02/03/23 -  Lillian Gibson, OT Occupational Therapist OT                  OT Recommendation and Plan  Planned Therapy Interventions (OT): activity tolerance training, adaptive equipment training, BADL retraining, neuromuscular control/coordination retraining, patient/caregiver education/training, transfer/mobility retraining, strengthening exercise, ROM/therapeutic exercise, occupation/activity based interventions, functional balance retraining  Therapy Frequency (OT): daily  Plan of Care Review  Plan of Care Reviewed With: patient  Outcome Evaluation: OT initial eval completed. Pt presents with multiple comorbidities and decreased strength, balance, activity tolerance limiting independence with ADL's and mobility from baseline status. Recommend continued skilled OT services and transfer to SNF at d/c if deemed medically appropriate.     Time Calculation:   Evaluation Complexity (OT)  Review Occupational  Profile/Medical/Therapy History Complexity: expanded/moderate complexity  Assessment, Occupational Performance/Identification of Deficit Complexity: 3-5 performance deficits  Clinical Decision Making Complexity (OT): detailed assessment/moderate complexity  Overall Complexity of Evaluation (OT): moderate complexity     Time Calculation- OT       Row Name 03/25/24 1141             Time Calculation- OT    OT Start Time 1050  -JR      OT Received On 03/25/24  -JR      OT Goal Re-Cert Due Date 04/04/24  -JR         Timed Charges    38706 - OT Self Care/Mgmt Minutes 8  -JR         Untimed Charges    OT Eval/Re-eval Minutes 46  -JR         Total Minutes    Timed Charges Total Minutes 8  -JR      Untimed Charges Total Minutes 46  -JR       Total Minutes 54  -JR                User Key  (r) = Recorded By, (t) = Taken By, (c) = Cosigned By      Initials Name Provider Type     Lillian Gibson OT Occupational Therapist                  Therapy Charges for Today       Code Description Service Date Service Provider Modifiers Qty    77914132937 HC OT SELF CARE/MGMT/TRAIN EA 15 MIN 3/25/2024 Lillian Gibson OT GO 1    36321085920 HC OT EVAL MOD COMPLEXITY 4 3/25/2024 Lillian Gibson OT GO 1                 Lillian Gibson, OT  3/25/2024

## 2024-03-26 VITALS
SYSTOLIC BLOOD PRESSURE: 131 MMHG | BODY MASS INDEX: 23.92 KG/M2 | WEIGHT: 135 LBS | OXYGEN SATURATION: 96 % | TEMPERATURE: 97.4 F | HEART RATE: 77 BPM | HEIGHT: 63 IN | RESPIRATION RATE: 18 BRPM | DIASTOLIC BLOOD PRESSURE: 61 MMHG

## 2024-03-26 PROBLEM — J44.1 COPD WITH EXACERBATION: Status: RESOLVED | Noted: 2024-03-23 | Resolved: 2024-03-26

## 2024-03-26 LAB
GLUCOSE BLDC GLUCOMTR-MCNC: 87 MG/DL (ref 70–130)
GLUCOSE BLDC GLUCOMTR-MCNC: 91 MG/DL (ref 70–130)

## 2024-03-26 PROCEDURE — 25010000002 CEFTRIAXONE PER 250 MG: Performed by: INTERNAL MEDICINE

## 2024-03-26 PROCEDURE — 82948 REAGENT STRIP/BLOOD GLUCOSE: CPT

## 2024-03-26 PROCEDURE — 94664 DEMO&/EVAL PT USE INHALER: CPT

## 2024-03-26 PROCEDURE — 25010000002 HEPARIN (PORCINE) PER 1000 UNITS: Performed by: HOSPITALIST

## 2024-03-26 PROCEDURE — 63710000001 PREDNISONE PER 1 MG: Performed by: INTERNAL MEDICINE

## 2024-03-26 PROCEDURE — 94799 UNLISTED PULMONARY SVC/PX: CPT

## 2024-03-26 PROCEDURE — 99239 HOSP IP/OBS DSCHRG MGMT >30: CPT | Performed by: FAMILY MEDICINE

## 2024-03-26 RX ORDER — CEFDINIR 300 MG/1
300 CAPSULE ORAL 2 TIMES DAILY
Qty: 6 CAPSULE | Refills: 0 | Status: SHIPPED | OUTPATIENT
Start: 2024-03-26 | End: 2024-03-29

## 2024-03-26 RX ORDER — TRAMADOL HYDROCHLORIDE 50 MG/1
50 TABLET ORAL EVERY 6 HOURS PRN
Qty: 18 TABLET | Refills: 0 | Status: SHIPPED | OUTPATIENT
Start: 2024-03-26

## 2024-03-26 RX ORDER — DOXYCYCLINE 100 MG/1
100 CAPSULE ORAL EVERY 12 HOURS SCHEDULED
Qty: 5 CAPSULE | Refills: 0 | Status: SHIPPED | OUTPATIENT
Start: 2024-03-26 | End: 2024-03-29

## 2024-03-26 RX ORDER — PREDNISONE 10 MG/1
TABLET ORAL
Qty: 53 TABLET | Refills: 0 | Status: SHIPPED | OUTPATIENT
Start: 2024-03-26 | End: 2024-04-20

## 2024-03-26 RX ORDER — GABAPENTIN 300 MG/1
300 CAPSULE ORAL 3 TIMES DAILY
Qty: 9 CAPSULE | Refills: 0 | Status: SHIPPED | OUTPATIENT
Start: 2024-03-26

## 2024-03-26 RX ORDER — ALPRAZOLAM 0.25 MG/1
0.25 TABLET ORAL 2 TIMES DAILY
Qty: 6 TABLET | Refills: 0 | Status: SHIPPED | OUTPATIENT
Start: 2024-03-26

## 2024-03-26 RX ADMIN — HEPARIN SODIUM 5000 UNITS: 5000 INJECTION INTRAVENOUS; SUBCUTANEOUS at 06:24

## 2024-03-26 RX ADMIN — Medication 2000 UNITS: at 09:09

## 2024-03-26 RX ADMIN — FLUTICASONE PROPIONATE 2 SPRAY: 50 SPRAY, METERED NASAL at 09:14

## 2024-03-26 RX ADMIN — GUAIFENESIN 600 MG: 600 TABLET, EXTENDED RELEASE ORAL at 09:09

## 2024-03-26 RX ADMIN — GABAPENTIN 300 MG: 300 CAPSULE ORAL at 09:08

## 2024-03-26 RX ADMIN — BUSPIRONE HYDROCHLORIDE 10 MG: 10 TABLET ORAL at 09:09

## 2024-03-26 RX ADMIN — PREDNISONE 40 MG: 20 TABLET ORAL at 09:08

## 2024-03-26 RX ADMIN — Medication 250 MG: at 09:14

## 2024-03-26 RX ADMIN — ASPIRIN 81 MG: 81 TABLET, COATED ORAL at 09:09

## 2024-03-26 RX ADMIN — BUDESONIDE AND FORMOTEROL FUMARATE DIHYDRATE 2 PUFF: 160; 4.5 AEROSOL RESPIRATORY (INHALATION) at 07:45

## 2024-03-26 RX ADMIN — IPRATROPIUM BROMIDE AND ALBUTEROL SULFATE 3 ML: 2.5; .5 SOLUTION RESPIRATORY (INHALATION) at 07:45

## 2024-03-26 RX ADMIN — ALPRAZOLAM 0.25 MG: 0.25 TABLET ORAL at 09:08

## 2024-03-26 RX ADMIN — LEVOTHYROXINE SODIUM 25 MCG: 25 TABLET ORAL at 06:24

## 2024-03-26 RX ADMIN — PANTOPRAZOLE SODIUM 40 MG: 40 TABLET, DELAYED RELEASE ORAL at 09:09

## 2024-03-26 RX ADMIN — Medication 10 ML: at 09:15

## 2024-03-26 RX ADMIN — Medication 400 UNITS: at 09:08

## 2024-03-26 RX ADMIN — SERTRALINE HYDROCHLORIDE 100 MG: 100 TABLET ORAL at 09:09

## 2024-03-26 RX ADMIN — SODIUM CHLORIDE 1000 MG: 900 INJECTION INTRAVENOUS at 09:09

## 2024-03-26 RX ADMIN — DOXYCYCLINE 100 MG: 100 CAPSULE ORAL at 09:09

## 2024-03-26 NOTE — CASE MANAGEMENT/SOCIAL WORK
Case Management Discharge Note      Final Note: Per discussion in MDR, pt is medically ready for discharge today and the plan is back to [facility]. Per Martine(liason for [place]), the facility is taking her back under skilled rehab as PT/OT will work with her. [facility] has access to retrieve discharge summary from [facility]. Pt's nurse can call report ot [phone] and send a copy of the discharge summary with pt. Any controlled meds will be escribed to the facility's pharmacy. Transportation arranged with [facility] to transport pt to [facility] today at 1345 and will pick pt up from room.PCS form in drop box. I spoke with daughter, Jessica on phone and she is agreeable to discharge plan. Daughter states she is  unable to provide transportation for pt.  I also met with pt in room. No further discharge needs         Selected Continued Care - Admitted Since 3/23/2024       Destination Coordination complete.      Service Provider Selected Services Address Phone Fax Patient Preferred    [facility] [place] [facility] [address] [place] [address] [phone] --              Durable Medical Equipment    No services have been selected for the patient.                Dialysis/Infusion    No services have been selected for the patient.                Home Medical Care    No services have been selected for the patient.                Therapy    No services have been selected for the patient.                Community Resources    No services have been selected for the patient.                Community & DME    No services have been selected for the patient.                    Selected Continued Care - Prior Encounters Includes continued care and service providers with selected services from prior encounters from 12/24/2023 to 3/26/2024      Discharged on 2/3/2024 Admission date: 2/2/2024 - Discharge disposition: Long Term Care (DC - External)      Destination       Service Provider  Selected Services Address Phone Fax Patient Preferred    [facility] [place] [id number] [facility] [id number] [phone] --                          Transportation Services  Ambulance: [facility]    Final Discharge Disposition Code: 03 - skilled nursing facility (SNF)

## 2024-03-26 NOTE — PROGRESS NOTES
NN spoke with pt at BS.  Pt alert and able to answer questions appropriately. Pt O2 sat 89% on  2 L currently, home O2 use  2L. COPD action plan reviewed. Deep breathing exercises encouraged. No new concerns or questions voiced at this time.  NN will continue to follow as needed.         Per current [employer] Standards, please consider:  No LAMA in place, LABA/ICS in place (Breo), Outpatient PFT, Rehab as appropriate, Palliative Care consult, Annual LDCT per current screening guidelines (age 50-80 years old, smoking history of 20 pack years or more or has quit within past 15 years)          Patient has been scheduled with [facility] for 4/2/2024 @ 9:30 am with VELMA Pulido MD to establish care.

## 2024-03-26 NOTE — DISCHARGE SUMMARY
Psychiatric Medicine Services  DISCHARGE SUMMARY    Patient Name: Beckie Romeo  : 1945  MRN: [id number]    Date of Admission: 3/23/2024  4:39 PM  Date of Discharge:  3/26/2024  Primary Care Physician: Provider, No Known    Consults       No orders found from 2024 to 3/24/2024.            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 female with COPD on 2L O2, anxiety, HTN, HLD, who presented with dyspnea, cough, congestion x 1 week.  She was admitted for RUL pneumonia and COPD exacerbation.     RUL pneumonia  COPD with acute exacerbation  Chronic respiratory failure with hypoxia  Possible RUL mass  -Blood cultures NGTD  -Transition to PO ABX, Omnicef and Doxycycline   -S/P IV solumedrol; Prednisone taper at DC   -Both practice partner and myself reviewed RUL findings with patient. She is aware. Discussed need to ensure PNA fully treated and then plan for re-imaging. She will follow-up in [facility].      Possible UTI  -Will follow-up culture   -Omnicef at DC      Anemia  -H&H stable     HTN  -Continue carvedilol    Discharge Follow Up Recommendations for outpatient labs/diagnostics:  -PCP 1 week  -Referral generated for [facility] to follow-up on imaging     Day of Discharge     HPI:   Patient seen and examined. No issues overnight. Eager to DC today.     Review of Systems  Gen- No fevers, chills  CV- No chest pain, palpitations  Resp- No cough, dyspnea  GI- No N/V/D, abd pain    Vital Signs:   Temp:  [97.8 °F (36.6 °C)-98.3 °F (36.8 °C)] 98.3 °F (36.8  °C)  Heart Rate:  [53-88] 73  Resp:  [18] 18  BP: (120-157)/(53-94) 127/55  Flow (L/min):  [1-3] 2      Physical Exam:  Constitutional: No acute distress, awake, alert, elderly female  HENT: NCAT, mucous membranes moist, poor dentition   Respiratory: Decreased in bases bilaterally, respiratory effort normal 2L NC  Cardiovascular: RRR, no murmurs, rubs, or gallops  Gastrointestinal: Positive bowel sounds, soft, nontender, nondistended  Musculoskeletal: No bilateral ankle edema  Psychiatric: Appropriate affect, cooperative  Neurologic: No focal deficits, speech clear  Skin: No rashes     Pertinent  and/or Most Recent Results     LAB RESULTS:      Lab 03/25/24  0451 03/24/24  0719 03/23/24  1703   WBC 12.65* 12.10* 24.30*   HEMOGLOBIN 8.5* 9.0* 8.7*   HEMATOCRIT 28.8* 31.3* 29.9*   PLATELETS 329 305 314   NEUTROS ABS 10.63*  --  20.82*   IMMATURE GRANS (ABS) 0.11*  --  0.26*   LYMPHS ABS 1.19  --  1.32   MONOS ABS 0.70  --  1.53*   EOS ABS 0.00  --  0.33   .0* 98.7* 101.7*   PROCALCITONIN  --   --  0.43*   LACTATE  --   --  1.0         Lab 03/25/24  0451 03/24/24  0719 03/23/24  1703   SODIUM 144 142 136   POTASSIUM 4.2 4.1 3.7   CHLORIDE 106 103 96*   CO2 32.0* 32.0* 37.0*   ANION GAP 6.0 7.0 3.0*   BUN 26* 28* 36*   CREATININE 0.68 0.65 0.72   EGFR 89.8 90.8 86.2   GLUCOSE 109* 121* 106*   CALCIUM 8.7 8.4* 9.2   MAGNESIUM  --   --  2.1   TSH  --   --  0.979         Lab 03/24/24  0719 03/23/24  1703   TOTAL PROTEIN 5.4* 6.5   ALBUMIN 3.0* 3.1*   GLOBULIN 2.4 3.4   ALT (SGPT) 12 13   AST (SGOT) 20 22   BILIRUBIN 0.2 0.2   ALK PHOS 113 121*         Lab 03/23/24  1703   PROBNP 1,082.0   HSTROP T 7                 Lab 03/23/24  1712   PH, ARTERIAL 7.324*   PCO2, ARTERIAL 66.7*   PO2 ART 70.3*   FIO2 28   HCO3 ART 34.7*   BASE EXCESS ART 7.2*   CARBOXYHEMOGLOBIN 1.2     Brief Urine Lab Results  (Last result in the past 365 days)        Color   Clarity   Blood   Leuk Est   Nitrite   Protein   CREAT   Urine HCG         03/23/24 1901 Yellow   Turbid   Moderate (2+)   Moderate (2+)   Negative   100 mg/dL (2+)                 Microbiology Results (last 10 days)       Procedure Component Value - Date/Time    Urine Culture - Urine, Urine, Clean Catch [[id number]]  (Normal) Collected: 03/23/24 1901    Lab Status: Preliminary result Specimen: Urine, Clean Catch Updated: 03/26/24 0914     Urine Culture Culture in progress    MRSA Screen, PCR (Inpatient) - Swab, Nares [[id number]]  (Normal) Collected: 03/23/24 1843    Lab Status: Final result Specimen: Swab from Nares Updated: 03/23/24 2010     MRSA PCR Negative    Narrative:      The negative predictive value of this diagnostic test is high and should only be used to consider de-escalating anti-MRSA therapy. A positive result may indicate colonization with MRSA and must be correlated clinically.  MRSA Negative    COVID PRE-OP / PRE-PROCEDURE SCREENING ORDER (NO ISOLATION) - Swab, Nasopharynx [[id number]]  (Normal) Collected: 03/23/24 1736    Lab Status: Final result Specimen: Swab from Nasopharynx Updated: 03/23/24 1845    Narrative:      The following orders were created for panel order COVID PRE-OP / PRE-PROCEDURE SCREENING ORDER (NO ISOLATION) - Swab, Nasopharynx.  Procedure                               Abnormality         Status                     ---------                               -----------         ------                     Respiratory Panel PCR w/...[[id number]]  Normal              Final result                 Please view results for these tests on the individual orders.    Respiratory Panel PCR w/COVID-19(SARS-CoV-2) [facility] In-House, NP Swab in UTM/VTM, 2 HR TAT - Swab, Nasopharynx [[id number]]  (Normal) Collected: 03/23/24 1736    Lab Status: Final result Specimen: Swab from Nasopharynx Updated: 03/23/24 1845     ADENOVIRUS, PCR Not Detected     Coronavirus 229E Not Detected     Coronavirus HKU1 Not Detected     Coronavirus NL63 Not Detected     Coronavirus  OC43 Not Detected     COVID19 Not Detected     Human Metapneumovirus Not Detected     Human Rhinovirus/Enterovirus Not Detected     Influenza A PCR Not Detected     Influenza B PCR Not Detected     Parainfluenza Virus 1 Not Detected     Parainfluenza Virus 2 Not Detected     Parainfluenza Virus 3 Not Detected     Parainfluenza Virus 4 Not Detected     RSV, PCR Not Detected     Bordetella pertussis pcr Not Detected     Bordetella parapertussis PCR Not Detected     Chlamydophila pneumoniae PCR Not Detected     Mycoplasma pneumo by PCR Not Detected    Narrative:      In the setting of a positive respiratory panel with a viral infection PLUS a negative procalcitonin without other underlying concern for bacterial infection, consider observing off antibiotics or discontinuation of antibiotics and continue supportive care. If the respiratory panel is positive for atypical bacterial infection (Bordetella pertussis, Chlamydophila pneumoniae, or Mycoplasma pneumoniae), consider antibiotic de-escalation to target atypical bacterial infection.    Blood Culture - Blood, Arm, Left [[id number]]  (Normal) Collected: 03/23/24 1733    Lab Status: Preliminary result Specimen: Blood from Arm, Left Updated: 03/25/24 1745     Blood Culture No growth at 2 days    Narrative:      Aerobic Bottle Only    Less than seven (7) mL's of blood was collected.  Insufficient quantity may yield false negative results.    Blood Culture - Blood, Arm, Right [[id number]]  (Normal) Collected: 03/23/24 1703    Lab Status: Preliminary result Specimen: Blood from Arm, Right Updated: 03/25/24 1731     Blood Culture No growth at 2 days            CT Chest Without Contrast Diagnostic    Result Date: 3/23/2024  CT CHEST WO CONTRAST DIAGNOSTIC Date of Exam: 3/23/2024 7:09 PM EDT Indication: abnormal CXR. Comparison: Plain film obtained on the same date Technique: Axial CT images were obtained of the chest without contrast administration.  Reconstructed coronal and  sagittal images were also obtained. Automated exposure control and iterative construction methods were used. Findings: Right upper lobe masslike opacity measuring 4.4 cm x 3.3 cm. Surrounding infiltration concerning for developing pneumonia within the right upper lobe. 0.8 cm right lower lobe nodule on image #70. Dependent atelectatic changes bilaterally. Pleural-based calcifications noted on the left. Volume loss in the left lower lobe indicative of underlying atelectasis. Partially calcified peripheral atelectatic changes in the left upper lobe. 1.1 cm x 0.6 cm left upper lobe nodule. 0.6 cm left upper lobe nodule on axial image #39. No pneumothorax or pleural effusion. The thyroid gland is normal. The subglottic airway is clear. Severe atheromatous disease of the coronary vessels. No pericardial effusion. Right hilar calcified lymph node consistent with prior granulomatous disease. Extensive calcified granulomata within the liver and spleen. Medial left renal cyst. Thoracic vertebral body height and alignment are normal. Scoliotic curvature of the spine. The spinous processes are intact.     Rounded right upper lobe masslike opacity with surrounding infiltrate. This is concerning for an underlying mass. Differential considerations do include focal pneumonia. The surrounding infiltrate is concerning for developing pneumonia. Repeat plain film posttreatment recommended. Bilateral pulmonary nodules are also present. PET/CT and/or biopsy recommended.. Electronically Signed: Clive Reyes MD  3/23/2024 7:20 PM EDT  Workstation ID: [id number]    XR Chest 2 View    Result Date: 3/23/2024  XR CHEST 1 VW Date of Exam: 3/23/2024 5:06 PM EDT Indication: dyspnea, wheezing, recently finished abx for pneumonia Comparison: AP portable March 23, 2024 Findings: There is abnormal airspace disease in right upper lobe. This has developed since February suggesting this could reflect pneumonia.     Impression: 1.Airspace disease right  upper lobe better defined on the AP portable obtained earlier which could be secondary to pneumonia given the interval change since February. Follow-up will be warranted to document resolution. Electronically Signed: Cesar French MD  3/23/2024 5:29 PM EDT  Workstation ID: [id number]    XR Chest 1 View    Result Date: 3/23/2024  XR CHEST 1 VW Date of Exam: 3/23/2024 4:44 PM EDT Indication: SOA triage protocol Comparison: 3/2/2024 Findings: Heart size normal. New consolidation overlying the right upper lung concerning for pneumonia. Blunting of the left ankle stable from the prior study which may relate to small left pleural effusion or scarring. No pneumothorax. Dense aortic arch atherosclerosis. Convex right thoracic dextrocurvature.     Impression: 1. Right upper lobe pneumonia. Recommend follow-up to ensure resolution. 2. Stable blunting of the left lateral costophrenic angle which may relate to scarring and/or small left effusion. Electronically Signed: Baljit Smith MD  3/23/2024 4:59 PM EDT  Workstation ID: [id number]                 Plan for Follow-up of Pending Labs/Results: Inbox   Pending Labs       Order Current Status    Blood Culture - Blood, Arm, Left Preliminary result    Blood Culture - Blood, Arm, Right Preliminary result    Urine Culture - Urine, Urine, Clean Catch Preliminary result          Discharge Details        Discharge Medications        New Medications        Instructions Start Date   cefdinir 300 MG capsule  Commonly known as: OMNICEF   300 mg, Oral, 2 Times Daily      doxycycline 100 MG capsule  Commonly known as: MONODOX   100 mg, Oral, Every 12 Hours Scheduled      predniSONE 10 MG tablet  Commonly known as: DELTASONE   Take 4 tablets by mouth Daily for 5 days, THEN 3 tablets Daily for 5 days, THEN 2 tablets Daily for 5 days, THEN 1 tablet Daily for 5 days, THEN 0.5 tablets Daily for 5 days.   Start Date: March 26, 2024            Continue These Medications        Instructions Start Date    acetaminophen 325 MG tablet  Commonly known as: TYLENOL   650 mg, Oral, 2 Times Daily      albuterol sulfate  (90 Base) MCG/ACT inhaler  Commonly known as: PROVENTIL HFA;VENTOLIN HFA;PROAIR HFA   2 puffs, Inhalation, Every 6 Hours PRN      ALPRAZolam 0.25 MG tablet  Commonly known as: XANAX   0.25 mg, Oral, 2 Times Daily      aspirin 81 MG EC tablet   81 mg, Oral, Daily      baclofen 10 MG tablet  Commonly known as: LIORESAL   10 mg, Oral, 3 Times Daily PRN      Breo Ellipta 200-25 MCG/ACT inhaler  Generic drug: Fluticasone Furoate-Vilanterol   1 puff, Inhalation, Daily - RT      busPIRone 5 MG tablet  Commonly known as: BUSPAR   10 mg, Oral, 2 Times Daily      carvedilol 12.5 MG tablet  Commonly known as: COREG   12.5 mg, Oral, 2 Times Daily With Meals, Hold if BP less than 110/60 or HR <60/      cholecalciferol 25 MCG (1000 UT) tablet   2,000 Units, Oral, Daily      CLARITIN PO   10 mg, Oral, Nightly      Diclofenac Sodium 1 % gel gel  Commonly known as: VOLTAREN   2 g, Topical, 4 Times Daily PRN      fluticasone 50 MCG/ACT nasal spray  Commonly known as: FLONASE   2 sprays, Nasal, Daily      gabapentin 300 MG capsule  Commonly known as: NEURONTIN   300 mg, Oral, 3 Times Daily      GenTeal Tears 0.1-0.2-0.3 % solution   2 drops, Ophthalmic, 3 Times Daily, 1-2 drops in bilateral eyes      GUAIFENESIN ER PO   600 mg, Oral, 2 Times Daily      ipratropium-albuterol 0.5-2.5 mg/3 ml nebulizer  Commonly known as: DUO-NEB   3 mL, Nebulization, 3 Times Daily PRN      levothyroxine 25 MCG tablet  Commonly known as: SYNTHROID, LEVOTHROID   25 mcg, Oral, Every Early Morning      loperamide 2 MG capsule  Commonly known as: IMODIUM   2 mg, Oral, 4 Times Daily PRN, Give 2 tabs after 1st loose stool; give 1 tablet for any loose stools after. Max dose 4 tablets within a 24 hr period.      magnesium oxide 400 MG tablet  Commonly known as: MAG-OX   400 mg, Oral, Daily      melatonin 3 MG tablet   6 mg, Oral, Nightly       mirtazapine 15 MG tablet  Commonly known as: REMERON   7.5 mg, Oral, Nightly      ondansetron 4 MG tablet  Commonly known as: ZOFRAN   4 mg, Oral, 4 Times Daily PRN      pantoprazole 40 MG EC tablet  Commonly known as: PROTONIX   40 mg, Oral, Daily      POTASSIUM & SODIUM PHOSPHATES PO   250 mg, Oral, Daily, 250mg - 45 mg - 298 mg tablet      sertraline 100 MG tablet  Commonly known as: ZOLOFT   100 mg, Oral, Daily      traMADol 50 MG tablet  Commonly known as: ULTRAM   50 mg, Oral, Every 6 Hours PRN      vitamin E 400 UNIT capsule   400 Units, Oral, Daily             Stop These Medications      ciprofloxacin 500 MG tablet  Commonly known as: CIPRO              Allergies   Allergen Reactions    Codeine Unknown - Low Severity    Zanaflex [Tizanidine Hcl] Other (See Comments)     Low blood pressure         Discharge Disposition:  Long Term Care (DC - External)    Diet:  Hospital:  Diet Order   Procedures    Diet: Regular/House; Fluid Consistency: Thin (IDDSI 0)            Activity:      Restrictions or Other Recommendations:       CODE STATUS:    Code Status and Medical Interventions:   Ordered at: 03/23/24 1900     Code Status (Patient has no pulse and is not breathing):    CPR (Attempt to Resuscitate)     Medical Interventions (Patient has pulse or is breathing):    Full Support       Future Appointments   Date Time Provider Department Center   3/26/2024  1:45 PM MED 8  [facility]   4/2/2024  9:30 AM Otoniel Pulido [place] [facility] [place]       Additional Instructions for the Follow-ups that You Need to Schedule       Ambulatory Referral to [facility]   As directed      Discharge Follow-up with PCP   As directed       Currently Documented PCP:    Provider, No Known    PCP Phone Number:    None     Follow Up Details: 1 week                      Anabell Prakash DO  03/26/24      Time Spent on Discharge:  I spent  50  minutes on this discharge activity which included: face-to-face  encounter with the patient, reviewing the data in the system, coordination of the care with the nursing staff as well as consultants, documentation, and entering orders.

## 2024-03-27 LAB
BACTERIA SPEC AEROBE CULT: ABNORMAL
QT INTERVAL: 378 MS
QTC INTERVAL: 439 MS

## 2024-03-28 LAB
BACTERIA SPEC AEROBE CULT: NORMAL
BACTERIA SPEC AEROBE CULT: NORMAL

## 2024-09-27 ENCOUNTER — APPOINTMENT (OUTPATIENT)
Dept: GENERAL RADIOLOGY | Facility: HOSPITAL | Age: 79
DRG: 871 | End: 2024-09-27
Payer: MEDICARE

## 2024-09-27 ENCOUNTER — APPOINTMENT (OUTPATIENT)
Dept: CT IMAGING | Facility: HOSPITAL | Age: 79
DRG: 871 | End: 2024-09-27
Payer: MEDICARE

## 2024-09-27 ENCOUNTER — HOSPITAL ENCOUNTER (INPATIENT)
Facility: HOSPITAL | Age: 79
LOS: 3 days | Discharge: NURSING, REHAB OR LONG-TERM CARE | DRG: 871 | End: 2024-10-01
Attending: STUDENT IN AN ORGANIZED HEALTH CARE EDUCATION/TRAINING PROGRAM | Admitting: HOSPITALIST
Payer: MEDICARE

## 2024-09-27 DIAGNOSIS — F41.0 PANIC ATTACK: ICD-10-CM

## 2024-09-27 DIAGNOSIS — R13.10 DYSPHAGIA, UNSPECIFIED TYPE: ICD-10-CM

## 2024-09-27 DIAGNOSIS — N39.0 ACUTE UTI (URINARY TRACT INFECTION): ICD-10-CM

## 2024-09-27 DIAGNOSIS — G89.29 OTHER CHRONIC PAIN: ICD-10-CM

## 2024-09-27 DIAGNOSIS — A41.9 SEPSIS WITHOUT ACUTE ORGAN DYSFUNCTION, DUE TO UNSPECIFIED ORGANISM: Primary | ICD-10-CM

## 2024-09-27 DIAGNOSIS — J44.9 CHRONIC OBSTRUCTIVE PULMONARY DISEASE, UNSPECIFIED COPD TYPE: ICD-10-CM

## 2024-09-27 LAB
ALBUMIN SERPL-MCNC: 3.5 G/DL (ref 3.5–5.2)
ALBUMIN/GLOB SERPL: 0.9 G/DL
ALP SERPL-CCNC: 141 U/L (ref 39–117)
ALT SERPL W P-5'-P-CCNC: 49 U/L (ref 1–33)
ANION GAP SERPL CALCULATED.3IONS-SCNC: 10 MMOL/L (ref 5–15)
AST SERPL-CCNC: 93 U/L (ref 1–32)
B PARAPERT DNA SPEC QL NAA+PROBE: NOT DETECTED
B PERT DNA SPEC QL NAA+PROBE: NOT DETECTED
BACTERIA UR QL AUTO: ABNORMAL /HPF
BASOPHILS # BLD AUTO: 0.05 10*3/MM3 (ref 0–0.2)
BASOPHILS NFR BLD AUTO: 0.2 % (ref 0–1.5)
BILIRUB SERPL-MCNC: 0.2 MG/DL (ref 0–1.2)
BILIRUB UR QL STRIP: NEGATIVE
BUN SERPL-MCNC: 41 MG/DL (ref 8–23)
BUN/CREAT SERPL: 36.9 (ref 7–25)
C PNEUM DNA NPH QL NAA+NON-PROBE: NOT DETECTED
CALCIUM SPEC-SCNC: 9.3 MG/DL (ref 8.6–10.5)
CHLORIDE SERPL-SCNC: 101 MMOL/L (ref 98–107)
CLARITY UR: ABNORMAL
CO2 SERPL-SCNC: 33 MMOL/L (ref 22–29)
COLOR UR: YELLOW
CREAT SERPL-MCNC: 1.11 MG/DL (ref 0.57–1)
CRP SERPL-MCNC: 8.74 MG/DL (ref 0–0.5)
D-LACTATE SERPL-SCNC: 1 MMOL/L (ref 0.5–2)
DEPRECATED RDW RBC AUTO: 52.6 FL (ref 37–54)
EGFRCR SERPLBLD CKD-EPI 2021: 51.3 ML/MIN/1.73
EOSINOPHIL # BLD AUTO: 0.01 10*3/MM3 (ref 0–0.4)
EOSINOPHIL NFR BLD AUTO: 0 % (ref 0.3–6.2)
ERYTHROCYTE [DISTWIDTH] IN BLOOD BY AUTOMATED COUNT: 15.4 % (ref 12.3–15.4)
FLUAV SUBTYP SPEC NAA+PROBE: NOT DETECTED
FLUBV RNA ISLT QL NAA+PROBE: NOT DETECTED
GLOBULIN UR ELPH-MCNC: 3.9 GM/DL
GLUCOSE BLDC GLUCOMTR-MCNC: 115 MG/DL (ref 70–130)
GLUCOSE SERPL-MCNC: 121 MG/DL (ref 65–99)
GLUCOSE UR STRIP-MCNC: NEGATIVE MG/DL
HADV DNA SPEC NAA+PROBE: NOT DETECTED
HCOV 229E RNA SPEC QL NAA+PROBE: NOT DETECTED
HCOV HKU1 RNA SPEC QL NAA+PROBE: NOT DETECTED
HCOV NL63 RNA SPEC QL NAA+PROBE: NOT DETECTED
HCOV OC43 RNA SPEC QL NAA+PROBE: NOT DETECTED
HCT VFR BLD AUTO: 33.4 % (ref 34–46.6)
HGB BLD-MCNC: 9.7 G/DL (ref 12–15.9)
HGB UR QL STRIP.AUTO: ABNORMAL
HMPV RNA NPH QL NAA+NON-PROBE: NOT DETECTED
HOLD SPECIMEN: NORMAL
HPIV1 RNA ISLT QL NAA+PROBE: NOT DETECTED
HPIV2 RNA SPEC QL NAA+PROBE: NOT DETECTED
HPIV3 RNA NPH QL NAA+PROBE: NOT DETECTED
HPIV4 P GENE NPH QL NAA+PROBE: NOT DETECTED
HYALINE CASTS UR QL AUTO: ABNORMAL /LPF
IMM GRANULOCYTES # BLD AUTO: 0.34 10*3/MM3 (ref 0–0.05)
IMM GRANULOCYTES NFR BLD AUTO: 1.2 % (ref 0–0.5)
KETONES UR QL STRIP: ABNORMAL
LEUKOCYTE ESTERASE UR QL STRIP.AUTO: ABNORMAL
LIPASE SERPL-CCNC: 28 U/L (ref 13–60)
LYMPHOCYTES # BLD AUTO: 1.96 10*3/MM3 (ref 0.7–3.1)
LYMPHOCYTES NFR BLD AUTO: 7.1 % (ref 19.6–45.3)
M PNEUMO IGG SER IA-ACNC: NOT DETECTED
MAGNESIUM SERPL-MCNC: 2.5 MG/DL (ref 1.6–2.4)
MCH RBC QN AUTO: 26.9 PG (ref 26.6–33)
MCHC RBC AUTO-ENTMCNC: 29 G/DL (ref 31.5–35.7)
MCV RBC AUTO: 92.5 FL (ref 79–97)
MONOCYTES # BLD AUTO: 2.2 10*3/MM3 (ref 0.1–0.9)
MONOCYTES NFR BLD AUTO: 8 % (ref 5–12)
NEUTROPHILS NFR BLD AUTO: 22.86 10*3/MM3 (ref 1.7–7)
NEUTROPHILS NFR BLD AUTO: 83.5 % (ref 42.7–76)
NITRITE UR QL STRIP: NEGATIVE
NRBC BLD AUTO-RTO: 0 /100 WBC (ref 0–0.2)
NT-PROBNP SERPL-MCNC: 667.9 PG/ML (ref 0–1800)
PH UR STRIP.AUTO: 6 [PH] (ref 5–8)
PHOSPHATE SERPL-MCNC: 3.3 MG/DL (ref 2.5–4.5)
PLATELET # BLD AUTO: 605 10*3/MM3 (ref 140–450)
PMV BLD AUTO: 8.9 FL (ref 6–12)
POTASSIUM SERPL-SCNC: 4.3 MMOL/L (ref 3.5–5.2)
PROCALCITONIN SERPL-MCNC: 0.09 NG/ML (ref 0–0.25)
PROT SERPL-MCNC: 7.4 G/DL (ref 6–8.5)
PROT UR QL STRIP: ABNORMAL
RBC # BLD AUTO: 3.61 10*6/MM3 (ref 3.77–5.28)
RBC # UR STRIP: ABNORMAL /HPF
REF LAB TEST METHOD: ABNORMAL
RHINOVIRUS RNA SPEC NAA+PROBE: NOT DETECTED
RSV RNA NPH QL NAA+NON-PROBE: NOT DETECTED
SARS-COV-2 RNA NPH QL NAA+NON-PROBE: DETECTED
SODIUM SERPL-SCNC: 144 MMOL/L (ref 136–145)
SP GR UR STRIP: 1.02 (ref 1–1.03)
SQUAMOUS #/AREA URNS HPF: ABNORMAL /HPF
TROPONIN T SERPL HS-MCNC: 15 NG/L
TSH SERPL DL<=0.05 MIU/L-ACNC: 0.91 UIU/ML (ref 0.27–4.2)
UROBILINOGEN UR QL STRIP: ABNORMAL
WBC # UR STRIP: ABNORMAL /HPF
WBC NRBC COR # BLD AUTO: 27.42 10*3/MM3 (ref 3.4–10.8)
WHOLE BLOOD HOLD COAG: NORMAL
WHOLE BLOOD HOLD SPECIMEN: NORMAL

## 2024-09-27 PROCEDURE — 85025 COMPLETE CBC W/AUTO DIFF WBC: CPT | Performed by: STUDENT IN AN ORGANIZED HEALTH CARE EDUCATION/TRAINING PROGRAM

## 2024-09-27 PROCEDURE — 93010 ELECTROCARDIOGRAM REPORT: CPT | Performed by: INTERNAL MEDICINE

## 2024-09-27 PROCEDURE — 80053 COMPREHEN METABOLIC PANEL: CPT | Performed by: STUDENT IN AN ORGANIZED HEALTH CARE EDUCATION/TRAINING PROGRAM

## 2024-09-27 PROCEDURE — 87186 SC STD MICRODIL/AGAR DIL: CPT | Performed by: STUDENT IN AN ORGANIZED HEALTH CARE EDUCATION/TRAINING PROGRAM

## 2024-09-27 PROCEDURE — 84484 ASSAY OF TROPONIN QUANT: CPT | Performed by: STUDENT IN AN ORGANIZED HEALTH CARE EDUCATION/TRAINING PROGRAM

## 2024-09-27 PROCEDURE — 25810000003 SODIUM CHLORIDE 0.9 % SOLUTION: Performed by: STUDENT IN AN ORGANIZED HEALTH CARE EDUCATION/TRAINING PROGRAM

## 2024-09-27 PROCEDURE — 84443 ASSAY THYROID STIM HORMONE: CPT | Performed by: STUDENT IN AN ORGANIZED HEALTH CARE EDUCATION/TRAINING PROGRAM

## 2024-09-27 PROCEDURE — 84100 ASSAY OF PHOSPHORUS: CPT | Performed by: STUDENT IN AN ORGANIZED HEALTH CARE EDUCATION/TRAINING PROGRAM

## 2024-09-27 PROCEDURE — 25010000002 VANCOMYCIN HCL 1.25 G RECONSTITUTED SOLUTION 1 EACH VIAL: Performed by: STUDENT IN AN ORGANIZED HEALTH CARE EDUCATION/TRAINING PROGRAM

## 2024-09-27 PROCEDURE — 82948 REAGENT STRIP/BLOOD GLUCOSE: CPT

## 2024-09-27 PROCEDURE — 73130 X-RAY EXAM OF HAND: CPT

## 2024-09-27 PROCEDURE — 83880 ASSAY OF NATRIURETIC PEPTIDE: CPT | Performed by: STUDENT IN AN ORGANIZED HEALTH CARE EDUCATION/TRAINING PROGRAM

## 2024-09-27 PROCEDURE — 70450 CT HEAD/BRAIN W/O DYE: CPT

## 2024-09-27 PROCEDURE — 0202U NFCT DS 22 TRGT SARS-COV-2: CPT | Performed by: STUDENT IN AN ORGANIZED HEALTH CARE EDUCATION/TRAINING PROGRAM

## 2024-09-27 PROCEDURE — 81001 URINALYSIS AUTO W/SCOPE: CPT | Performed by: STUDENT IN AN ORGANIZED HEALTH CARE EDUCATION/TRAINING PROGRAM

## 2024-09-27 PROCEDURE — 83690 ASSAY OF LIPASE: CPT | Performed by: STUDENT IN AN ORGANIZED HEALTH CARE EDUCATION/TRAINING PROGRAM

## 2024-09-27 PROCEDURE — 93005 ELECTROCARDIOGRAM TRACING: CPT | Performed by: STUDENT IN AN ORGANIZED HEALTH CARE EDUCATION/TRAINING PROGRAM

## 2024-09-27 PROCEDURE — 93005 ELECTROCARDIOGRAM TRACING: CPT

## 2024-09-27 PROCEDURE — 87040 BLOOD CULTURE FOR BACTERIA: CPT | Performed by: STUDENT IN AN ORGANIZED HEALTH CARE EDUCATION/TRAINING PROGRAM

## 2024-09-27 PROCEDURE — 25010000002 PIPERACILLIN SOD-TAZOBACTAM PER 1 G: Performed by: STUDENT IN AN ORGANIZED HEALTH CARE EDUCATION/TRAINING PROGRAM

## 2024-09-27 PROCEDURE — 87077 CULTURE AEROBIC IDENTIFY: CPT | Performed by: STUDENT IN AN ORGANIZED HEALTH CARE EDUCATION/TRAINING PROGRAM

## 2024-09-27 PROCEDURE — 25510000001 IOPAMIDOL 61 % SOLUTION: Performed by: STUDENT IN AN ORGANIZED HEALTH CARE EDUCATION/TRAINING PROGRAM

## 2024-09-27 PROCEDURE — 74177 CT ABD & PELVIS W/CONTRAST: CPT

## 2024-09-27 PROCEDURE — 87086 URINE CULTURE/COLONY COUNT: CPT | Performed by: STUDENT IN AN ORGANIZED HEALTH CARE EDUCATION/TRAINING PROGRAM

## 2024-09-27 PROCEDURE — 86140 C-REACTIVE PROTEIN: CPT | Performed by: STUDENT IN AN ORGANIZED HEALTH CARE EDUCATION/TRAINING PROGRAM

## 2024-09-27 PROCEDURE — 25810000003 SODIUM CHLORIDE 0.9 % SOLUTION 250 ML FLEX CONT: Performed by: STUDENT IN AN ORGANIZED HEALTH CARE EDUCATION/TRAINING PROGRAM

## 2024-09-27 PROCEDURE — 36415 COLL VENOUS BLD VENIPUNCTURE: CPT

## 2024-09-27 PROCEDURE — P9612 CATHETERIZE FOR URINE SPEC: HCPCS

## 2024-09-27 PROCEDURE — 83735 ASSAY OF MAGNESIUM: CPT | Performed by: STUDENT IN AN ORGANIZED HEALTH CARE EDUCATION/TRAINING PROGRAM

## 2024-09-27 PROCEDURE — 84145 PROCALCITONIN (PCT): CPT | Performed by: STUDENT IN AN ORGANIZED HEALTH CARE EDUCATION/TRAINING PROGRAM

## 2024-09-27 PROCEDURE — 83605 ASSAY OF LACTIC ACID: CPT | Performed by: STUDENT IN AN ORGANIZED HEALTH CARE EDUCATION/TRAINING PROGRAM

## 2024-09-27 PROCEDURE — 71045 X-RAY EXAM CHEST 1 VIEW: CPT

## 2024-09-27 PROCEDURE — 99291 CRITICAL CARE FIRST HOUR: CPT

## 2024-09-27 RX ORDER — QUETIAPINE FUMARATE 25 MG/1
25 TABLET, FILM COATED ORAL ONCE
Status: DISCONTINUED | OUTPATIENT
Start: 2024-09-28 | End: 2024-09-28

## 2024-09-27 RX ORDER — IOPAMIDOL 612 MG/ML
80 INJECTION, SOLUTION INTRAVASCULAR
Status: COMPLETED | OUTPATIENT
Start: 2024-09-27 | End: 2024-09-27

## 2024-09-27 RX ORDER — SODIUM CHLORIDE 0.9 % (FLUSH) 0.9 %
10 SYRINGE (ML) INJECTION AS NEEDED
Status: DISCONTINUED | OUTPATIENT
Start: 2024-09-27 | End: 2024-10-01 | Stop reason: HOSPADM

## 2024-09-27 RX ORDER — IPRATROPIUM BROMIDE AND ALBUTEROL SULFATE 2.5; .5 MG/3ML; MG/3ML
3 SOLUTION RESPIRATORY (INHALATION) ONCE
Status: DISCONTINUED | OUTPATIENT
Start: 2024-09-28 | End: 2024-09-28

## 2024-09-27 RX ADMIN — IOPAMIDOL 80 ML: 612 INJECTION, SOLUTION INTRAVENOUS at 22:22

## 2024-09-27 RX ADMIN — PIPERACILLIN AND TAZOBACTAM 3.38 G: 3; .375 INJECTION, POWDER, LYOPHILIZED, FOR SOLUTION INTRAVENOUS at 21:50

## 2024-09-27 RX ADMIN — SODIUM CHLORIDE 1632 ML: 9 INJECTION, SOLUTION INTRAVENOUS at 21:42

## 2024-09-27 RX ADMIN — VANCOMYCIN HYDROCHLORIDE 1250 MG: 1.25 INJECTION, POWDER, LYOPHILIZED, FOR SOLUTION INTRAVENOUS at 23:00

## 2024-09-28 PROBLEM — G93.41 METABOLIC ENCEPHALOPATHY: Status: ACTIVE | Noted: 2024-09-28

## 2024-09-28 PROBLEM — U07.1 COVID-19 VIRUS INFECTION: Status: ACTIVE | Noted: 2024-09-28

## 2024-09-28 PROBLEM — A41.9 SEPSIS: Status: ACTIVE | Noted: 2024-09-28

## 2024-09-28 LAB
ALBUMIN SERPL-MCNC: 3 G/DL (ref 3.5–5.2)
ALBUMIN/GLOB SERPL: 1.1 G/DL
ALP SERPL-CCNC: 114 U/L (ref 39–117)
ALT SERPL W P-5'-P-CCNC: 46 U/L (ref 1–33)
ANION GAP SERPL CALCULATED.3IONS-SCNC: 11 MMOL/L (ref 5–15)
AST SERPL-CCNC: 67 U/L (ref 1–32)
BASOPHILS # BLD AUTO: 0.02 10*3/MM3 (ref 0–0.2)
BASOPHILS NFR BLD AUTO: 0.1 % (ref 0–1.5)
BILIRUB SERPL-MCNC: 0.2 MG/DL (ref 0–1.2)
BUN SERPL-MCNC: 32 MG/DL (ref 8–23)
BUN/CREAT SERPL: 39.5 (ref 7–25)
CALCIUM SPEC-SCNC: 8.9 MG/DL (ref 8.6–10.5)
CHLORIDE SERPL-SCNC: 104 MMOL/L (ref 98–107)
CO2 SERPL-SCNC: 28 MMOL/L (ref 22–29)
CREAT SERPL-MCNC: 0.81 MG/DL (ref 0.57–1)
DEPRECATED RDW RBC AUTO: 52.1 FL (ref 37–54)
EGFRCR SERPLBLD CKD-EPI 2021: 74.9 ML/MIN/1.73
EOSINOPHIL # BLD AUTO: 0.02 10*3/MM3 (ref 0–0.4)
EOSINOPHIL NFR BLD AUTO: 0.1 % (ref 0.3–6.2)
ERYTHROCYTE [DISTWIDTH] IN BLOOD BY AUTOMATED COUNT: 15.3 % (ref 12.3–15.4)
GLOBULIN UR ELPH-MCNC: 2.8 GM/DL
GLUCOSE SERPL-MCNC: 95 MG/DL (ref 65–99)
HCT VFR BLD AUTO: 30.4 % (ref 34–46.6)
HGB BLD-MCNC: 9 G/DL (ref 12–15.9)
IMM GRANULOCYTES # BLD AUTO: 0.14 10*3/MM3 (ref 0–0.05)
IMM GRANULOCYTES NFR BLD AUTO: 0.8 % (ref 0–0.5)
LYMPHOCYTES # BLD AUTO: 1.15 10*3/MM3 (ref 0.7–3.1)
LYMPHOCYTES NFR BLD AUTO: 6.7 % (ref 19.6–45.3)
MCH RBC QN AUTO: 27.4 PG (ref 26.6–33)
MCHC RBC AUTO-ENTMCNC: 29.6 G/DL (ref 31.5–35.7)
MCV RBC AUTO: 92.4 FL (ref 79–97)
MONOCYTES # BLD AUTO: 1.28 10*3/MM3 (ref 0.1–0.9)
MONOCYTES NFR BLD AUTO: 7.5 % (ref 5–12)
NEUTROPHILS NFR BLD AUTO: 14.57 10*3/MM3 (ref 1.7–7)
NEUTROPHILS NFR BLD AUTO: 84.8 % (ref 42.7–76)
NRBC BLD AUTO-RTO: 0 /100 WBC (ref 0–0.2)
PLATELET # BLD AUTO: 491 10*3/MM3 (ref 140–450)
PMV BLD AUTO: 9 FL (ref 6–12)
POTASSIUM SERPL-SCNC: 3.9 MMOL/L (ref 3.5–5.2)
PROT SERPL-MCNC: 5.8 G/DL (ref 6–8.5)
QT INTERVAL: 356 MS
QTC INTERVAL: 423 MS
RBC # BLD AUTO: 3.29 10*6/MM3 (ref 3.77–5.28)
SODIUM SERPL-SCNC: 143 MMOL/L (ref 136–145)
VANCOMYCIN SERPL-MCNC: <4 MCG/ML (ref 5–40)
WBC NRBC COR # BLD AUTO: 17.18 10*3/MM3 (ref 3.4–10.8)

## 2024-09-28 PROCEDURE — 99223 1ST HOSP IP/OBS HIGH 75: CPT | Performed by: FAMILY MEDICINE

## 2024-09-28 PROCEDURE — 94640 AIRWAY INHALATION TREATMENT: CPT

## 2024-09-28 PROCEDURE — 92610 EVALUATE SWALLOWING FUNCTION: CPT

## 2024-09-28 PROCEDURE — 25010000002 PIPERACILLIN SOD-TAZOBACTAM PER 1 G

## 2024-09-28 PROCEDURE — 25810000003 SODIUM CHLORIDE 0.9 % SOLUTION 250 ML FLEX CONT

## 2024-09-28 PROCEDURE — 85025 COMPLETE CBC W/AUTO DIFF WBC: CPT | Performed by: INTERNAL MEDICINE

## 2024-09-28 PROCEDURE — 80202 ASSAY OF VANCOMYCIN: CPT

## 2024-09-28 PROCEDURE — 25010000002 ZIPRASIDONE MESYLATE PER 10 MG: Performed by: FAMILY MEDICINE

## 2024-09-28 PROCEDURE — 94799 UNLISTED PULMONARY SVC/PX: CPT

## 2024-09-28 PROCEDURE — 25010000002 VANCOMYCIN 1 G RECONSTITUTED SOLUTION 1 EACH VIAL

## 2024-09-28 PROCEDURE — 80053 COMPREHEN METABOLIC PANEL: CPT

## 2024-09-28 PROCEDURE — 25010000002 CEFTRIAXONE PER 250 MG: Performed by: INTERNAL MEDICINE

## 2024-09-28 RX ORDER — ACETAMINOPHEN 650 MG/1
650 SUPPOSITORY RECTAL EVERY 4 HOURS PRN
Status: DISCONTINUED | OUTPATIENT
Start: 2024-09-28 | End: 2024-10-01 | Stop reason: HOSPADM

## 2024-09-28 RX ORDER — BACLOFEN 10 MG/1
10 TABLET ORAL 3 TIMES DAILY PRN
Status: DISCONTINUED | OUTPATIENT
Start: 2024-09-28 | End: 2024-10-01 | Stop reason: HOSPADM

## 2024-09-28 RX ORDER — ASPIRIN 81 MG/1
81 TABLET, CHEWABLE ORAL DAILY
Status: DISCONTINUED | OUTPATIENT
Start: 2024-09-28 | End: 2024-10-01 | Stop reason: HOSPADM

## 2024-09-28 RX ORDER — PANTOPRAZOLE SODIUM 40 MG/1
40 TABLET, DELAYED RELEASE ORAL DAILY
Status: DISCONTINUED | OUTPATIENT
Start: 2024-09-28 | End: 2024-09-28

## 2024-09-28 RX ORDER — SODIUM CHLORIDE 0.9 % (FLUSH) 0.9 %
10 SYRINGE (ML) INJECTION AS NEEDED
Status: DISCONTINUED | OUTPATIENT
Start: 2024-09-28 | End: 2024-10-01 | Stop reason: HOSPADM

## 2024-09-28 RX ORDER — MIDAZOLAM HYDROCHLORIDE 1 MG/ML
0.5 INJECTION INTRAMUSCULAR; INTRAVENOUS EVERY 6 HOURS PRN
Status: DISCONTINUED | OUTPATIENT
Start: 2024-09-28 | End: 2024-09-29

## 2024-09-28 RX ORDER — GABAPENTIN 300 MG/1
300 CAPSULE ORAL 3 TIMES DAILY
Status: DISCONTINUED | OUTPATIENT
Start: 2024-09-28 | End: 2024-10-01 | Stop reason: HOSPADM

## 2024-09-28 RX ORDER — LEVOTHYROXINE SODIUM 25 UG/1
25 TABLET ORAL
Status: DISCONTINUED | OUTPATIENT
Start: 2024-09-28 | End: 2024-10-01 | Stop reason: HOSPADM

## 2024-09-28 RX ORDER — ASPIRIN 81 MG/1
81 TABLET ORAL DAILY
Status: DISCONTINUED | OUTPATIENT
Start: 2024-09-28 | End: 2024-09-28

## 2024-09-28 RX ORDER — ALPRAZOLAM 0.25 MG
0.25 TABLET ORAL 2 TIMES DAILY
Status: DISCONTINUED | OUTPATIENT
Start: 2024-09-28 | End: 2024-10-01 | Stop reason: HOSPADM

## 2024-09-28 RX ORDER — MIRTAZAPINE 15 MG/1
7.5 TABLET, FILM COATED ORAL NIGHTLY
Status: DISCONTINUED | OUTPATIENT
Start: 2024-09-28 | End: 2024-10-01 | Stop reason: HOSPADM

## 2024-09-28 RX ORDER — BISACODYL 5 MG/1
5 TABLET, DELAYED RELEASE ORAL DAILY PRN
Status: DISCONTINUED | OUTPATIENT
Start: 2024-09-28 | End: 2024-10-01 | Stop reason: HOSPADM

## 2024-09-28 RX ORDER — BISACODYL 10 MG
10 SUPPOSITORY, RECTAL RECTAL DAILY PRN
Status: DISCONTINUED | OUTPATIENT
Start: 2024-09-28 | End: 2024-10-01 | Stop reason: HOSPADM

## 2024-09-28 RX ORDER — SERTRALINE HYDROCHLORIDE 100 MG/1
100 TABLET, FILM COATED ORAL DAILY
Status: DISCONTINUED | OUTPATIENT
Start: 2024-09-28 | End: 2024-10-01 | Stop reason: HOSPADM

## 2024-09-28 RX ORDER — SODIUM CHLORIDE 9 MG/ML
40 INJECTION, SOLUTION INTRAVENOUS AS NEEDED
Status: DISCONTINUED | OUTPATIENT
Start: 2024-09-28 | End: 2024-10-01 | Stop reason: HOSPADM

## 2024-09-28 RX ORDER — BUSPIRONE HYDROCHLORIDE 10 MG/1
10 TABLET ORAL 2 TIMES DAILY
Status: DISCONTINUED | OUTPATIENT
Start: 2024-09-28 | End: 2024-10-01 | Stop reason: HOSPADM

## 2024-09-28 RX ORDER — IPRATROPIUM BROMIDE AND ALBUTEROL SULFATE 2.5; .5 MG/3ML; MG/3ML
3 SOLUTION RESPIRATORY (INHALATION) EVERY 6 HOURS PRN
Status: DISCONTINUED | OUTPATIENT
Start: 2024-09-28 | End: 2024-10-01 | Stop reason: HOSPADM

## 2024-09-28 RX ORDER — ZIPRASIDONE MESYLATE 20 MG/ML
10 INJECTION, POWDER, LYOPHILIZED, FOR SOLUTION INTRAMUSCULAR EVERY 4 HOURS PRN
Status: DISCONTINUED | OUTPATIENT
Start: 2024-09-28 | End: 2024-09-28

## 2024-09-28 RX ORDER — DEXAMETHASONE SODIUM PHOSPHATE 4 MG/ML
4 INJECTION, SOLUTION INTRA-ARTICULAR; INTRALESIONAL; INTRAMUSCULAR; INTRAVENOUS; SOFT TISSUE DAILY
Status: DISCONTINUED | OUTPATIENT
Start: 2024-09-28 | End: 2024-09-28

## 2024-09-28 RX ORDER — POLYETHYLENE GLYCOL 3350 17 G/17G
17 POWDER, FOR SOLUTION ORAL DAILY PRN
Status: DISCONTINUED | OUTPATIENT
Start: 2024-09-28 | End: 2024-10-01 | Stop reason: HOSPADM

## 2024-09-28 RX ORDER — AMOXICILLIN 250 MG
2 CAPSULE ORAL 2 TIMES DAILY PRN
Status: DISCONTINUED | OUTPATIENT
Start: 2024-09-28 | End: 2024-10-01 | Stop reason: HOSPADM

## 2024-09-28 RX ORDER — CETIRIZINE HYDROCHLORIDE 10 MG/1
10 TABLET ORAL DAILY
Status: DISCONTINUED | OUTPATIENT
Start: 2024-09-28 | End: 2024-10-01 | Stop reason: HOSPADM

## 2024-09-28 RX ORDER — CARVEDILOL 12.5 MG/1
12.5 TABLET ORAL 2 TIMES DAILY WITH MEALS
Status: DISCONTINUED | OUTPATIENT
Start: 2024-09-28 | End: 2024-10-01 | Stop reason: HOSPADM

## 2024-09-28 RX ORDER — ACETAMINOPHEN 325 MG/1
650 TABLET ORAL EVERY 4 HOURS PRN
Status: DISCONTINUED | OUTPATIENT
Start: 2024-09-28 | End: 2024-10-01 | Stop reason: HOSPADM

## 2024-09-28 RX ORDER — SODIUM CHLORIDE 0.9 % (FLUSH) 0.9 %
10 SYRINGE (ML) INJECTION EVERY 12 HOURS SCHEDULED
Status: DISCONTINUED | OUTPATIENT
Start: 2024-09-28 | End: 2024-10-01 | Stop reason: HOSPADM

## 2024-09-28 RX ORDER — ACETAMINOPHEN 160 MG/5ML
650 SOLUTION ORAL EVERY 4 HOURS PRN
Status: DISCONTINUED | OUTPATIENT
Start: 2024-09-28 | End: 2024-10-01 | Stop reason: HOSPADM

## 2024-09-28 RX ADMIN — GABAPENTIN 300 MG: 300 CAPSULE ORAL at 12:55

## 2024-09-28 RX ADMIN — PIPERACILLIN AND TAZOBACTAM 3.38 G: 3; .375 INJECTION, POWDER, LYOPHILIZED, FOR SOLUTION INTRAVENOUS at 06:18

## 2024-09-28 RX ADMIN — VANCOMYCIN HYDROCHLORIDE 1000 MG: 1 INJECTION, POWDER, LYOPHILIZED, FOR SOLUTION INTRAVENOUS at 14:20

## 2024-09-28 RX ADMIN — SODIUM CHLORIDE 2000 MG: 900 INJECTION INTRAVENOUS at 12:57

## 2024-09-28 RX ADMIN — BACLOFEN 10 MG: 10 TABLET ORAL at 21:50

## 2024-09-28 RX ADMIN — BUSPIRONE HYDROCHLORIDE 10 MG: 10 TABLET ORAL at 12:56

## 2024-09-28 RX ADMIN — Medication 10 ML: at 21:50

## 2024-09-28 RX ADMIN — GABAPENTIN 300 MG: 300 CAPSULE ORAL at 21:50

## 2024-09-28 RX ADMIN — GABAPENTIN 300 MG: 300 CAPSULE ORAL at 16:55

## 2024-09-28 RX ADMIN — ACETAMINOPHEN 650 MG: 325 TABLET ORAL at 14:20

## 2024-09-28 RX ADMIN — ZIPRASIDONE MESYLATE 10 MG: 20 INJECTION, POWDER, LYOPHILIZED, FOR SOLUTION INTRAMUSCULAR at 04:47

## 2024-09-28 RX ADMIN — SERTRALINE 100 MG: 100 TABLET, FILM COATED ORAL at 12:54

## 2024-09-28 RX ADMIN — ASPIRIN 81 MG 81 MG: 81 TABLET ORAL at 14:20

## 2024-09-28 RX ADMIN — MIRTAZAPINE 7.5 MG: 15 TABLET, FILM COATED ORAL at 21:50

## 2024-09-28 RX ADMIN — Medication 10 ML: at 12:56

## 2024-09-28 RX ADMIN — BUSPIRONE HYDROCHLORIDE 10 MG: 10 TABLET ORAL at 21:50

## 2024-09-28 RX ADMIN — CETIRIZINE HYDROCHLORIDE 10 MG: 10 TABLET, FILM COATED ORAL at 12:55

## 2024-09-28 RX ADMIN — IPRATROPIUM BROMIDE AND ALBUTEROL SULFATE 3 ML: 2.5; .5 SOLUTION RESPIRATORY (INHALATION) at 13:35

## 2024-09-28 RX ADMIN — Medication 10 ML: at 04:50

## 2024-09-28 NOTE — PLAN OF CARE
Goal Outcome Evaluation:            Pt alert to self. Visual and auditory hallucinations. Extremely poor dentition but pt will not let RN clean mouth. Pt removed both PIV and monitoring equipment in ED. New PIV started in RFA, wrapped in coban. Pt arrived from ED with soiled brief of urine and stool. Pt easily agitated throughout assessment and care. Pt will not cooperate to assess dysphagia screen.  Pt trying to remove new PIV and not easily redirected with increase in agitation.  IM geodon given as ordered. Pt able to relax however she continues to talk in her sleep. No skin breakdown noted. Coccyx allevyn applied.

## 2024-09-28 NOTE — PLAN OF CARE
Goal Outcome Evaluation:  Plan of Care Reviewed With: patient      SLP evaluation completed. Will continue to address dysphagia. Please see note for further details and recommendations.        Anticipated Discharge Disposition (SLP): skilled nursing facility        SLP Swallowing Diagnosis: mod-severe, oral dysphagia, functional pharyngeal phase (09/28/24 1126)

## 2024-09-28 NOTE — THERAPY EVALUATION
Acute Care - Speech Language Pathology   Swallow Initial Evaluation [facility]  Clinical Swallow Evaluation       Patient Name: Beckie Romeo  : 1945  MRN: [id number]  Today's Date: 2024               Admit Date: 2024    Visit Dx:     ICD-10-CM ICD-9-CM   1. Sepsis without acute organ dysfunction, due to unspecified organism  A41.9 038.9     995.91   2. Acute UTI (urinary tract infection)  N39.0 599.0   3. Chronic obstructive pulmonary disease, unspecified COPD type  J44.9 496     Patient Active Problem List   Diagnosis    COPD (chronic obstructive pulmonary disease)    Hypocalcemia    Anemia    Hypothyroidism    Essential hypertension    GERD without esophagitis    Panic attack    Hematuria    Anxiety disorder due to known physiological condition    Sepsis     Past Medical History:   Diagnosis Date    Age-related physical debility     Allergic rhinitis     Anemia     Atherosclerotic heart disease of native coronary artery without angina pectoris     Cognitive communication deficit     Contact with and (suspected) exposure to covid-19     COPD (chronic obstructive pulmonary disease)     Cough     Depression     Diarrhea, unspecified     Disease of thyroid gland     Flatulence     GERD (gastroesophageal reflux disease)     Hyperlipidemia     Hypertension     Insomnia     Major depressive disorder with single episode     Nausea with vomiting     Other specified anxiety disorders     Paroxysmal A-fib     Polyneuropathy     Sciatica of left side     Sciatica of right side     Unspecified protein-calorie malnutrition      History reviewed. No pertinent surgical history.    SLP Recommendation and Plan  SLP Swallowing Diagnosis: mod-severe, oral dysphagia, functional pharyngeal phase (24 1126)  SLP Diet Recommendation: puree, thin liquids (24 1126)  Recommended Precautions and Strategies: upright posture during/after eating, small bites of food and sips of liquid, check mouth frequently  for oral residue/pocketing, general aspiration precautions, 1:1 supervision, assist with feeding (09/28/24 1126)  SLP Rec. for Method of Medication Administration: meds crushed, with puree (09/28/24 1126)     Monitor for Signs of Aspiration: yes, notify SLP if any concerns (09/28/24 1126)     Swallow Criteria for Skilled Therapeutic Interventions Met: demonstrates skilled criteria (09/28/24 1126)  Anticipated Discharge Disposition (SLP): skilled nursing facility (09/28/24 1126)  Rehab Potential/Prognosis, Swallowing: good, to achieve stated therapy goals (09/28/24 1126)  Therapy Frequency (Swallow): 5 days per week (09/28/24 1126)  Predicted Duration Therapy Intervention (Days): 1 week (09/28/24 1126)  Oral Care Recommendations: Toothbrush, Oral Care before breakfast, after meals and PRN (09/28/24 1126)                                        Plan of Care Reviewed With: patient      SWALLOW EVALUATION (Last 72 Hours)       SLP Adult Swallow Evaluation       Row Name 09/28/24 1126                   Rehab Evaluation    Document Type evaluation  -KH        Subjective Information no complaints  -KH        Patient Observations alert;agree to therapy  -KH        Patient/Family/Caregiver Comments/Observations none  -KH        Patient Effort good  -KH        Symptoms Noted During/After Treatment none  -KH           General Information    Patient Profile Reviewed yes  -KH        Pertinent History Of Current Problem Pt with unknown PMH. Pt is a resident at [facility]. Pt adm with AMS, sepsis secondary to UTI, and Covid.  -KH        Current Method of Nutrition NPO  -KH        Precautions/Limitations, Vision WFL;for purposes of eval  -KH        Precautions/Limitations, Hearing WFL;for purposes of eval  -KH        Prior Level of Function-Communication unknown  -KH        Prior Level of Function-Swallowing unknown  -        Plans/Goals Discussed with patient;agreed upon  -        Barriers to Rehab previous functional  deficit;cognitive status  -KH           Pain    Additional Documentation Pain Scale: FACES Pre/Post-Treatment (Group)  -KH           Pain Scale: FACES Pre/Post-Treatment    Pain: FACES Scale, Pretreatment 0-->no hurt  -KH        Posttreatment Pain Rating 0-->no hurt  -KH           Oral Motor Structure and Function    Dentition Assessment poor oral hygiene;missing teeth;teeth are in poor condition  -KH        Secretion Management problems swallowing secretions  -KH        Mucosal Quality dry  -KH        Volitional Swallow WFL  -KH        Volitional Cough WFL  -KH           Oral Musculature and Cranial Nerve Assessment    Oral Motor General Assessment generalized oral motor weakness  -KH           General Eating/Swallowing Observations    Respiratory Support Currently in Use nasal cannula  -KH        O2 Liters 2L  -KH        Eating/Swallowing Skills fed by SLP  -KH        Positioning During Eating upright 90 degree;upright in bed  -KH        Utensils Used spoon;cup;straw  -KH        Consistencies Trialed ice chips;thin liquids;pureed;soft to chew textures  -KH           Respiratory    Respiratory Status WFL  -KH           Clinical Swallow Eval    Oral Prep Phase impaired  -KH        Oral Transit impaired  -KH        Oral Residue impaired  -KH        Pharyngeal Phase no overt signs/symptoms of pharyngeal impairment  -KH        Esophageal Phase unremarkable  -KH           Oral Prep Concerns    Oral Prep Concerns prolonged mastication;inefficient mastication;bolus removed from mouth manually  -KH        Prolonged Mastication other (see comments)  soft solid  -KH        Inefficient Mastication other (see comments)  soft solid  -KH        Bolus Removed from Mouth Manually other (see comments)  soft solid  -KH           Oral Transit Concerns    Oral Transit Concerns increased oral transit time  -KH        Increased Oral Transit Time other (see comments)  soft solid  -KH           Oral Residue Concerns    Oral Residue  Concerns lateral sulcus residue, left  -        Lateral Sulcus Residue, Left other (see comments)  soft solid; unable to be cleared with liquid wash  -           Swallowing Quality of Life Assessment    Education and counseling provided Signs of aspiration;Risks of aspiration;Safest diet options;Oral care recommendations and rationale  -           SLP Evaluation Clinical Impression    SLP Swallowing Diagnosis mod-severe;oral dysphagia;functional pharyngeal phase  -        Functional Impact risk of aspiration/pneumonia;risk of malnutrition;risk of dehydration  -        Rehab Potential/Prognosis, Swallowing good, to achieve stated therapy goals  -        Swallow Criteria for Skilled Therapeutic Interventions Met demonstrates skilled criteria  -           Recommendations    Therapy Frequency (Swallow) 5 days per week  -        Predicted Duration Therapy Intervention (Days) 1 week  -        SLP Diet Recommendation puree;thin liquids  -        Recommended Precautions and Strategies upright posture during/after eating;small bites of food and sips of liquid;check mouth frequently for oral residue/pocketing;general aspiration precautions;1:1 supervision;assist with feeding  -        Oral Care Recommendations Toothbrush;Oral Care before breakfast, after meals and PRN  -        SLP Rec. for Method of Medication Administration meds crushed;with puree  -        Monitor for Signs of Aspiration yes;notify SLP if any concerns  -        Anticipated Discharge Disposition (SLP) skilled nursing facility  -                  User Key  (r) = Recorded By, (t) = Taken By, (c) = Cosigned By      Initials Name Effective Dates    Gloria Doyle, MS CCC-SLP 01/15/24 -                     EDUCATION  The patient has been educated in the following areas:   Dysphagia (Swallowing Impairment) Oral Care/Hydration.        SLP GOALS       Row Name 09/28/24 1126             (LTG) Patient will demonstrate functional  swallow for    Diet Texture (Demonstrate functional swallow) mechanical ground textures  -KH      Liquid viscosity (Demonstrate functional swallow) thin liquids  -KH      [place] (Demonstrate functional swallow) independently (over 90% accuracy)  -KH      Time Frame (Demonstrate functional swallow) 1 week  -KH         (STG) Patient will tolerate trials of    Consistencies Trialed (Tolerate trials) pureed textures;thin liquids  -KH      Desired Outcome (Tolerate trials) without signs/symptoms of aspiration;without signs of distress;with adequate oral prep/transit/clearance  -KH      [place] (Tolerate trials) independently (over 90% accuracy)  -KH      Time Frame (Tolerate trials) 1 week  -KH                User Key  (r) = Recorded By, (t) = Taken By, (c) = Cosigned By      Initials Name Provider Type    Gloria Doyle MS CCC-SLP Speech and Language Pathologist                         Time Calculation:    Time Calculation- SLP       Row Name 09/28/24 1209             Time Calculation- SLP    SLP Start Time 1126  -KH      SLP Received On 09/28/24  -         Untimed Charges    SLP Eval/Re-eval  ST Eval Oral Pharyng Swallow - 99000  -KH      99106-SC Eval Oral Pharyng Swallow Minutes 55  -KH         Total Minutes    Untimed Charges Total Minutes 55  -KH       Total Minutes 55  -KH                User Key  (r) = Recorded By, (t) = Taken By, (c) = Cosigned By      Initials Name Provider Type    Gloria Doyle MS CCC-SLP Speech and Language Pathologist                    Therapy Charges for Today       Code Description Service Date Service Provider Modifiers Qty    [id number] HC ST EVAL ORAL PHARYNG SWALLOW 4 9/28/2024 Gloria Will MS CCC-TUCKER GN 1                 MS DEON Jacome  9/28/2024

## 2024-09-28 NOTE — PROGRESS NOTES
"Pharmacy Consult-Vancomycin Dosing  Beckie Romeo is a  78 y.o. female receiving vancomycin therapy.     Indication:sepsis/intra-abdominal infection/UTI  Consulting Provider: hospitalist  ID Consult:     Goal Trough: 15-20 mcg/mL    Allergies  Allergies as of 09/27/2024 - Reviewed 09/27/2024   Allergen Reaction Noted    Codeine Unknown - Low Severity 02/02/2024    Zanaflex [tizanidine hcl] Other (See Comments) 02/02/2024       Labs    Results from last 7 days   Lab Units 09/27/24  1930   BUN mg/dL 41*   CREATININE mg/dL 1.11*       Results from last 7 days   Lab Units 09/27/24 1930   WBC 10*3/mm3 27.42*       Evaluation of Dosing     Last Dose Received in the ED/Outside Facility: 1250mg  Is Patient on Dialysis or Renal Replacement: no    Ht - 165.1 cm (65\")  Wt - 40.8 kg (90 lb)    Estimated Creatinine Clearance: 27.3 mL/min (A) (by C-G formula based on SCr of 1.11 mg/dL (H)).    Intake & Output (last 3 days)       None            Microbiology and Radiology  Microbiology Results (last 10 days)       Procedure Component Value - Date/Time    COVID PRE-OP / PRE-PROCEDURE SCREENING ORDER (NO ISOLATION) - Swab, Nasopharynx [[id number]]  (Abnormal) Collected: 09/27/24 1954    Lab Status: Final result Specimen: Swab from Nasopharynx Updated: 09/27/24 2059    Narrative:      The following orders were created for panel order COVID PRE-OP / PRE-PROCEDURE SCREENING ORDER (NO ISOLATION) - Swab, Nasopharynx.  Procedure                               Abnormality         Status                     ---------                               -----------         ------                     Respiratory Panel PCR w/...[[id number]]  Abnormal            Final result                 Please view results for these tests on the individual orders.    Respiratory Panel PCR w/COVID-19(SARS-CoV-2) [facility]-House, NP Swab in UTM/VTM, 2 HR TAT - Swab, Nasopharynx [[id number]]  (Abnormal) Collected: 09/27/24 1954    Lab Status: Final " result Specimen: Swab from Nasopharynx Updated: 09/27/24 2059     ADENOVIRUS, PCR Not Detected     Coronavirus 229E Not Detected     Coronavirus HKU1 Not Detected     Coronavirus NL63 Not Detected     Coronavirus OC43 Not Detected     COVID19 Detected     Human Metapneumovirus Not Detected     Human Rhinovirus/Enterovirus Not Detected     Influenza A PCR Not Detected     Influenza B PCR Not Detected     Parainfluenza Virus 1 Not Detected     Parainfluenza Virus 2 Not Detected     Parainfluenza Virus 3 Not Detected     Parainfluenza Virus 4 Not Detected     RSV, PCR Not Detected     Bordetella pertussis pcr Not Detected     Bordetella parapertussis PCR Not Detected     Chlamydophila pneumoniae PCR Not Detected     Mycoplasma pneumo by PCR Not Detected    Narrative:      In the setting of a positive respiratory panel with a viral infection PLUS a negative procalcitonin without other underlying concern for bacterial infection, consider observing off antibiotics or discontinuation of antibiotics and continue supportive care. If the respiratory panel is positive for atypical bacterial infection (Bordetella pertussis, Chlamydophila pneumoniae, or Mycoplasma pneumoniae), consider antibiotic de-escalation to target atypical bacterial infection.            Vancomycin Levels:    Results from last 7 days   Lab Units 09/28/24  1214   VANCOMYCIN RM mcg/mL <4.00*             Assessment/Plan:      Pharmacy dosing Vancomycin for IAI. Goal -600 mg/L*hr  Vancomycin initiated with 1250mg (~20mg/kg).  Vancomycin level 9/28 undetectable, renal function improving.  Re-dose with Vancomycin 1250mg once today.  Obtain Vancomycin level 9/29.  Monitor renal function, culture results, clinical status and adjust as necessary.  Pharmacy will continue to follow.     Jaxon Mi, PharmD  9/28/2024  13:35 EDT

## 2024-09-28 NOTE — PROGRESS NOTES
"Pharmacy Consult-Vancomycin Dosing  Beckie Romeo is a  78 y.o. female receiving vancomycin therapy.     Indication:sepsis/intra-abdominal infection/UTI  Consulting Provider: hospitalist  ID Consult:     Goal Trough: 15-20    Current Antimicrobial Therapy  Anti-Infectives (From admission, onward)      Ordered     Dose/Rate Route Frequency Start Stop    09/28/24 0059  vancomycin (dosing per levels)        Ordering Provider: Clive Escudero, PharmD     Does not apply Daily 09/28/24 0900 10/05/24 0859    09/28/24 0059  piperacillin-tazobactam (ZOSYN) 3.375 g IVPB in 100 mL NS MBP (CD)        Ordering Provider: Clive Escudero, PharmD    3.375 g  over 4 Hours Intravenous Every 8 Hours 09/28/24 0600 10/03/24 0559    09/27/24 2126  Vancomycin HCl 1,250 mg in sodium chloride 0.9 % 250 mL VTB        Ordering Provider: Hiro Howell MD    22 mg/kg × 54.4 kg  200 mL/hr over 75 Minutes Intravenous Once 09/27/24 2215 09/28/24 0049    09/27/24 2126  piperacillin-tazobactam (ZOSYN) 3.375 g IVPB in 100 mL NS MBP (CD)        Ordering Provider: Hiro Howell MD    3.375 g  over 30 Minutes Intravenous Once 09/27/24 2145 09/27/24 2245            Allergies  Allergies as of 09/27/2024 - Reviewed 09/27/2024   Allergen Reaction Noted    Codeine Unknown - Low Severity 02/02/2024    Zanaflex [tizanidine hcl] Other (See Comments) 02/02/2024       Labs    Results from last 7 days   Lab Units 09/27/24  1930   BUN mg/dL 41*   CREATININE mg/dL 1.11*       Results from last 7 days   Lab Units 09/27/24  1930   WBC 10*3/mm3 27.42*       Evaluation of Dosing     Last Dose Received in the ED/Outside Facility: 1250mg  Is Patient on Dialysis or Renal Replacement: no    Ht - 165.1 cm (65\")  Wt - 54.4 kg (120 lb)    Estimated Creatinine Clearance: 36.5 mL/min (A) (by C-G formula based on SCr of 1.11 mg/dL (H)).    Intake & Output (last 3 days)       None            Microbiology and Radiology  Microbiology Results (last 10 days)  "      Procedure Component Value - Date/Time    COVID PRE-OP / PRE-PROCEDURE SCREENING ORDER (NO ISOLATION) - Swab, Nasopharynx [[id number]]  (Abnormal) Collected: 09/27/24 1954    Lab Status: Final result Specimen: Swab from Nasopharynx Updated: 09/27/24 2059    Narrative:      The following orders were created for panel order COVID PRE-OP / PRE-PROCEDURE SCREENING ORDER (NO ISOLATION) - Swab, Nasopharynx.  Procedure                               Abnormality         Status                     ---------                               -----------         ------                     Respiratory Panel PCR w/...[[id number]]  Abnormal            Final result                 Please view results for these tests on the individual orders.    Respiratory Panel PCR w/COVID-19(SARS-CoV-2) [facility], NP Swab in UTM/VTM, 2 HR TAT - Swab, Nasopharynx [[id number]]  (Abnormal) Collected: 09/27/24 1954    Lab Status: Final result Specimen: Swab from Nasopharynx Updated: 09/27/24 2059     ADENOVIRUS, PCR Not Detected     Coronavirus 229E Not Detected     Coronavirus HKU1 Not Detected     Coronavirus NL63 Not Detected     Coronavirus OC43 Not Detected     COVID19 Detected     Human Metapneumovirus Not Detected     Human Rhinovirus/Enterovirus Not Detected     Influenza A PCR Not Detected     Influenza B PCR Not Detected     Parainfluenza Virus 1 Not Detected     Parainfluenza Virus 2 Not Detected     Parainfluenza Virus 3 Not Detected     Parainfluenza Virus 4 Not Detected     RSV, PCR Not Detected     Bordetella pertussis pcr Not Detected     Bordetella parapertussis PCR Not Detected     Chlamydophila pneumoniae PCR Not Detected     Mycoplasma pneumo by PCR Not Detected    Narrative:      In the setting of a positive respiratory panel with a viral infection PLUS a negative procalcitonin without other underlying concern for bacterial infection, consider observing off antibiotics or discontinuation of antibiotics and  continue supportive care. If the respiratory panel is positive for atypical bacterial infection (Bordetella pertussis, Chlamydophila pneumoniae, or Mycoplasma pneumoniae), consider antibiotic de-escalation to target atypical bacterial infection.            Vancomycin Levels:                        Assessment/Plan:  The patient will be started on a bolus of 20mg/kg for a dose of 1250mg . Given the patient's current renal status, will dose per random levels and obtain a random level with morning labs before ordering another dose.  Due to infection severity, will target a trough of 15-20.    Pharmacy will continue to follow the patient’s culture results and clinical progress daily.    Clive Escudero, MiguelD

## 2024-09-28 NOTE — H&P
[facility] Medicine Services  HISTORY AND PHYSICAL    Patient Name: Beckie Romeo  : 1945  MRN: [id number]  Primary Care Physician: Emily De León MD  Date of admission: 2024      Subjective   Subjective     Chief Complaint:  Altered mental status    HPI:  Beckie Romeo is a 78 y.o. female brought into the emergency department via EMS for evaluation of altered mental status.  History of present illness limited by the patient's altered mental status, aggression, agitation. patient reportedly stays at the [place].  Attempted to contact patient's next of kin, daughter, without any answer, in attempts to obtain further history of present illness and CODE STATUS.  Nursing home personnel report her baseline mentation is alert and oriented x 1.  Upon arrival to the ED, patient was complaining of suprapubic pain and pain in her right wrist.     In the emergency department, creatinine 1.11, glucose 121, alk phos 141, AST 93, ALT 49, CRP 8.7, WBC 28, urinalysis probably contaminated, COVID-19 swab positive.  Chest x-ray shows changes of COPD, stable left upper lobe granulomas.  CT abdomen pelvis with contrast shows findings consistent with severe cystitis, linear air-filled tract in the anterior wall of the urinary bladder approximately 1.8 cm in length, concerning for an infection with gas-forming organism or cold vesicoenteric fistula.    Personal History     Past Medical History:   Diagnosis Date    Age-related physical debility     Allergic rhinitis     Anemia     Atherosclerotic heart disease of native coronary artery without angina pectoris     Cognitive communication deficit     Contact with and (suspected) exposure to covid-19     COPD (chronic obstructive pulmonary disease)     Cough     Depression     Diarrhea, unspecified     Disease of thyroid gland     Flatulence     GERD (gastroesophageal reflux disease)     Hyperlipidemia     Hypertension      Insomnia     Major depressive disorder with single episode     Nausea with vomiting     Other specified anxiety disorders     Paroxysmal A-fib     Polyneuropathy     Sciatica of left side     Sciatica of right side     Unspecified protein-calorie malnutrition      Past surgical history: Patient unable to provide surgical history due to altered mental status    Family History: family history includes No Known Problems in her father and mother.     Social History:  reports that she has quit smoking. Her smoking use included cigarettes. She has never used smokeless tobacco. She reports that she does not drink alcohol and does not use drugs.    Medications:  Available home medication information reviewed.  ALPRAZolam, Diclofenac Sodium, Fluticasone Furoate-Vilanterol, GenTeal Tears, Loratadine, Potassium & Sodium Phosphates, acetaminophen, albuterol sulfate HFA, aspirin, baclofen, busPIRone, carvedilol, cholecalciferol, fluticasone, gabapentin, guaiFENesin, ipratropium-albuterol, levothyroxine, loperamide, magnesium oxide, melatonin, mirtazapine, ondansetron, pantoprazole, sertraline, traMADol, and vitamin E    Allergies   Allergen Reactions    Codeine Unknown - Low Severity    Zanaflex [Tizanidine Hcl] Other (See Comments)     Low blood pressure       Objective   Objective     Vital Signs:   Temp:  [97.8 °F (36.6 °C)] 97.8 °F (36.6 °C)  Heart Rate:  [81-86] 86  Resp:  [18] 18  BP: (116-133)/(44-56) 123/47  Flow (L/min):  [4] 4       Physical Exam limited secondary to altered mental status, agitation and aggression.  Constitutional: Awake, alert  Eyes: PERRLA, sclerae anicteric, no conjunctival injection  HENT: mucous membranes moist.  Poor dentition  Neck: trachea midline  Respiratory: Patient refused, threatened violence  Cardiovascular: Patient refused, threatened violence  Gastrointestinal: Patient refused, threatened violence  Musculoskeletal: No bilateral ankle edema, no clubbing or cyanosis to  extremities  Psychiatric: Flat affect, not at all cooperative  Neurologic: Oriented to self only, moving all extremities in bed, Cranial Nerves grossly intact to confrontation, speech clear  Skin: No rashes     Result Review:  I have personally reviewed the results from the time of this admission to 9/28/2024 01:11 EDT and agree with these findings:  [x]  Laboratory list / accordion  []  Microbiology  [x]  Radiology  [x]  EKG/Telemetry   []  Cardiology/Vascular   []  Pathology  [x]  Old records  []  Other:  Most notable findings include: Summarized above      LAB RESULTS:      Lab 09/27/24 1930   WBC 27.42*   HEMOGLOBIN 9.7*   HEMATOCRIT 33.4*   PLATELETS 605*   NEUTROS ABS 22.86*   IMMATURE GRANS (ABS) 0.34*   LYMPHS ABS 1.96   MONOS ABS 2.20*   EOS ABS 0.01   MCV 92.5   CRP 8.74*   PROCALCITONIN 0.09   LACTATE 1.0         Lab 09/27/24 1930   SODIUM 144   POTASSIUM 4.3   CHLORIDE 101   CO2 33.0*   ANION GAP 10.0   BUN 41*   CREATININE 1.11*   EGFR 51.3*   GLUCOSE 121*   CALCIUM 9.3   MAGNESIUM 2.5*   PHOSPHORUS 3.3   TSH 0.908         Lab 09/27/24 1930   TOTAL PROTEIN 7.4   ALBUMIN 3.5   GLOBULIN 3.9   ALT (SGPT) 49*   AST (SGOT) 93*   BILIRUBIN 0.2   ALK PHOS 141*   LIPASE 28         Lab 09/27/24 1930   PROBNP 667.9   HSTROP T 15*                 UA          2/3/2024    10:31 3/23/2024    19:01 9/27/2024    20:31   Urinalysis   Squamous Epithelial Cells, UA 0-2  0-2  Unable to determine due to loaded field    Specific Gravity, UA 1.013  1.021  1.022    Ketones, UA Trace  Negative  Trace    Blood, UA Large (3+)  Moderate (2+)  Large (3+)    Leukocytes, UA Large (3+)  Moderate (2+)  Large (3+)    Nitrite, UA Negative  Negative  Negative    RBC, UA Too Numerous to Count  11-20  Unable to determine due to loaded field    WBC, UA Too Numerous to Count  Too Numerous to Count  Too Numerous to Count    Bacteria, UA None Seen  4+  Trace        Microbiology Results (last 10 days)       Procedure Component Value -  Date/Time    COVID PRE-OP / PRE-PROCEDURE SCREENING ORDER (NO ISOLATION) - Swab, Nasopharynx [[id number]]  (Abnormal) Collected: 09/27/24 1954    Lab Status: Final result Specimen: Swab from Nasopharynx Updated: 09/27/24 2059    Narrative:      The following orders were created for panel order COVID PRE-OP / PRE-PROCEDURE SCREENING ORDER (NO ISOLATION) - Swab, Nasopharynx.  Procedure                               Abnormality         Status                     ---------                               -----------         ------                     Respiratory Panel PCR w/...[[id number]]  Abnormal            Final result                 Please view results for these tests on the individual orders.    Respiratory Panel PCR w/COVID-19(SARS-CoV-2) [facility] In-House, NP Swab in UTM/VTM, 2 HR TAT - Swab, Nasopharynx [[id number]]  (Abnormal) Collected: 09/27/24 1954    Lab Status: Final result Specimen: Swab from Nasopharynx Updated: 09/27/24 2059     ADENOVIRUS, PCR Not Detected     Coronavirus 229E Not Detected     Coronavirus HKU1 Not Detected     Coronavirus NL63 Not Detected     Coronavirus OC43 Not Detected     COVID19 Detected     Human Metapneumovirus Not Detected     Human Rhinovirus/Enterovirus Not Detected     Influenza A PCR Not Detected     Influenza B PCR Not Detected     Parainfluenza Virus 1 Not Detected     Parainfluenza Virus 2 Not Detected     Parainfluenza Virus 3 Not Detected     Parainfluenza Virus 4 Not Detected     RSV, PCR Not Detected     Bordetella pertussis pcr Not Detected     Bordetella parapertussis PCR Not Detected     Chlamydophila pneumoniae PCR Not Detected     Mycoplasma pneumo by PCR Not Detected    Narrative:      In the setting of a positive respiratory panel with a viral infection PLUS a negative procalcitonin without other underlying concern for bacterial infection, consider observing off antibiotics or discontinuation of antibiotics and continue supportive care. If the  respiratory panel is positive for atypical bacterial infection (Bordetella pertussis, Chlamydophila pneumoniae, or Mycoplasma pneumoniae), consider antibiotic de-escalation to target atypical bacterial infection.            CT Abdomen Pelvis With Contrast    Result Date: 9/27/2024  CT ABDOMEN PELVIS W CONTRAST Date of Exam: 9/27/2024 10:06 PM EDT Indication: Sepsis, altered mental status, tenderness, gross pyuria. Comparison: None available. Technique: Axial CT images were obtained of the abdomen and pelvis following the uneventful intravenous administration of Isovue-300, 80 mL IV. Reconstructed coronal and sagittal images were also obtained. Automated exposure control and iterative construction methods were used. Findings: Motion artifact limits diagnostic sensitivity. Lung Bases: Bilateral dependent atelectasis versus scarring is visualized. Peritoneum: No free intraperitoneal air or fluid. Abdominal wall: No evidence of abdominal hernia. There is suggestion of prior laparotomy. Liver: The liver is within normal limits in size and contour. Scattered punctate granulomas are visualized. The portal vein is patent. Biliary/Gallbladder: The gallbladder is surgically absent. The biliary tree is nondilated. Pancreas: Pancreas is within normal limits. There is no evidence of pancreatic mass or peripancreatic inflammatory changes. Spleen: Spleen is normal in size and contour. Scattered granulomas are visualized. Gastrointestinal/Mesentery: No evidence of bowel obstruction or gross inflammatory changes. The appendix appears within normal limits.  Adrenals: Adrenal glands are unremarkable. Kidneys: The kidneys are in anatomic position. Multiple nonobstructing right-sided renal calculi are visualized measuring up to 1 cm in the right renal pelvis. No evidence of hydronephrosis or significant perinephric fat stranding. Cyst is visualized in the lower  pole of the left kidney measuring 2.8 cm. Bladder: There is marked urinary  bladder wall thickening and mucosal hyperenhancement. Air-filled tract is visualized in the anterior wall of the urinary bladder measuring 1.8 cm in length and 0.7 cm in width. Finding may be from prior suprapubic urinary bladder catheter placement. Small volume air is visualized in the urinary bladder. Reproductive organs:  The patient is post hysterectomy. Lymph Nodes: No significant adenopathy is identified. Vasculature: Moderate aortoiliac atheromatous changes are visualized. The abdominal aorta is normal in caliber. Osseous Structures:  No acute fracture or aggressive lesions. Mild to moderate degenerative changes of the lumbar spine are visualized. There is levoscoliosis of the lumbar spine.     Impression: Impression: Motion artifact limits evaluation. Findings consistent with severe cystitis. There is a linear air-filled tract in the anterior wall of the urinary bladder measuring 1.8 cm in length and 0.7 cm in width. Finding may be from prior suprapubic urinary bladder catheter placement. Small volume  air is also visualized in the urinary bladder. If there is no history of recent catheterization or instrumentation, infection with gas-forming organism or occult vesicoenteric fistula is a possibility. Nonobstructing right-sided nephrolithiasis. Ancillary findings as above. Electronically Signed: Quinton Vasquez MD  9/27/2024 10:34 PM EDT  Workstation ID: [id number]    CT Head Without Contrast    Result Date: 9/27/2024  CT HEAD WO CONTRAST Date of Exam: 9/27/2024 8:57 PM EDT Indication: altered mental status, suspect metabolic. Comparison: None available. Technique: Axial CT images were obtained of the head without contrast administration.  Automated exposure control and iterative construction methods were used. Findings: No evidence of acute intracranial hemorrhage or mass effect. No extra-axial collection. The gray white matter differentiation is preserved. Global parenchymal atrophy. Periventricular and  subcortical white matter hypodensity, nonspecific, but likely sequela of chronic small vessel ischemic disease. The mastoid air cells and paranasal sinuses are well aerated subtle opacification of the left sphenoid sinus. Globes and extraocular muscles are unremarkable. No acute or suspicious osseous abnormality. Soft tissues within normal limits.     Impression: Impression: No evidence of acute intracranial abnormality. Typical chronic/age-related findings including brain atrophy and white matter change suggestive of chronic small vessel ischemia. Electronically Signed: Oziel Muniz MD  9/27/2024 9:20 PM EDT  Workstation ID: [id number]    XR Hand 3+ View Right    Result Date: 9/27/2024  XR HAND 3+ VW RIGHT Date of Exam: 9/27/2024 8:48 PM EDT Indication: pain with out known trauma,but significant dementia Comparison: None available. Findings: No evidence of acute fracture. No joint malalignment. Minimal scattered degenerative changes predominately involving the interphalangeal joints. Bones are demineralized. Soft tissues appear within normal limits.     Impression: Impression: No acute fracture or joint malalignment. Minimal scattered degenerative changes involving the interphalangeal joints. Bones are demineralized. Electronically Signed: Oziel Muniz MD  9/27/2024 8:56 PM EDT  Workstation ID: [id number]    XR Chest 1 View    Result Date: 9/27/2024  XR CHEST 1 VW Date of Exam: 9/27/2024 8:08 PM EDT Indication: Weak/Dizzy/AMS triage protocol Comparison: Noncontrast chest CT performed on March 23, 2024 Findings: No acute infiltrate. Stable calcified granulomas in the left upper lobe. Left basilar scarring is visualized with associated pleural thickening. Lungs are hyper aerated. There is no evidence of pneumothorax.  Linear lucency adjacent to the left heart border was noted on prior chest imaging, likely artifactual secondary to adjacent scarring. The cardiac and mediastinal silhouette appear unremarkable.  Atheromatous aortic calcifications are visualized. No acute osseous abnormality identified.     Impression: Impression: No acute chest process evident. Changes of COPD Stable left upper lobe granulomas and left basilar scarring. Electronically Signed: Quinton Vasquez MD  9/27/2024 8:14 PM EDT  Workstation ID: [id number]         Assessment & Plan   Assessment & Plan     Sepsis secondary UTI  Rule out vesicoenteric fistula  Metabolic encephalopathy  Patient meets sepsis criteria with fever, leukocytosis, source of infection appears to be UTI.  Patient received 30 mL/kg IV fluid in the ED, blood pressure has been responsive.  Broad-spectrum antibiotics, IV Zosyn, IV vancomycin.  CT scan of the abdomen pelvis shows possible colovesicular fistula versus gas-forming organism causing cystitis.  Consult urology for possible cystoscopy, will defer medical decision making in this regard to urology service, appreciate their recommendations.  Geodon as needed for aggression and agitation.  Will reassess as needed.  Acute COVID-19  No remdesivir due to elevated LFTs.  Dexamethasone 4 mg IV daily.  Supportive care  Elevated LFTs  Otitis panel, acetaminophen, salicylate levels.  COPD  Hypertension  Hyperlipidemia  Paroxysmal A-fib  Protein calorie malnutrition  Continue home medications as appropriate.      Total time spent: 65 minutes  Time spent includes time reviewing chart, face-to-face time, counseling patient/family/caregiver, ordering medications/tests/procedures, communicating with other health care professionals, documenting clinical information in the electronic health record, and coordination of care.      VTE Prophylaxis:  Mechanical VTE prophylaxis orders are present.    CODE STATUS:    Code Status and Medical Interventions: CPR (Attempt to Resuscitate); Full Support   Ordered at: 09/28/24 0038     Level Of Support Discussed With:    Patient     Code Status (Patient has no pulse and is not breathing):    CPR (Attempt to  Resuscitate)     Medical Interventions (Patient has pulse or is breathing):    Full Support     Expected Discharge TBGLENNY Ferguson,   09/28/24

## 2024-09-28 NOTE — ED PROVIDER NOTES
EMERGENCY DEPARTMENT ENCOUNTER    Pt Name: Beckie Romeo  MRN: [id number]  Pt :   1945  Room Number:    Date of encounter:  2024  PCP: Emily De León MD  ED Provider: Hiro Howell MD    Historian: EMS, patient      HPI:  Chief Complaint: Conjunctival injection, suprapubic pain, wrist pain, possible altered mental status        Context: Beckie Romeo is a 78-year-old woman sent from nursing facility for altered mental status, concern for UTI and conjunctival injection in the right eye EMS report that she has a baseline mental status of being alert and oriented x 1.  Upon arrival here patient is able to answer most questions appropriately she does say that she is having some pain in her right hand and wrist does not know of any trauma.  She is also having suprapubic pain and dysuria.  No other complaints at this time.       PAST MEDICAL HISTORY  Past Medical History:   Diagnosis Date    Age-related physical debility     Allergic rhinitis     Anemia     Atherosclerotic heart disease of native coronary artery without angina pectoris     Cognitive communication deficit     Contact with and (suspected) exposure to covid-19     COPD (chronic obstructive pulmonary disease)     Cough     Depression     Diarrhea, unspecified     Disease of thyroid gland     Flatulence     GERD (gastroesophageal reflux disease)     Hyperlipidemia     Hypertension     Insomnia     Major depressive disorder with single episode     Nausea with vomiting     Other specified anxiety disorders     Paroxysmal A-fib     Polyneuropathy     Sciatica of left side     Sciatica of right side     Unspecified protein-calorie malnutrition          PAST SURGICAL HISTORY  History reviewed. No pertinent surgical history.      FAMILY HISTORY  Family History   Problem Relation Age of Onset    No Known Problems Mother     No Known Problems Father          SOCIAL HISTORY  Social History     Socioeconomic History     Marital status:    Tobacco Use    Smoking status: Former     Types: Cigarettes    Smokeless tobacco: Never   Vaping Use    Vaping status: Former    Passive vaping exposure: Yes   Substance and Sexual Activity    Alcohol use: Never    Drug use: Never    Sexual activity: Not Currently         ALLERGIES  Codeine and Zanaflex [tizanidine hcl]        REVIEW OF SYSTEMS  Review of Systems       All systems reviewed and negative except for those discussed in HPI.       PHYSICAL EXAM    I have reviewed the triage vital signs and nursing notes.    ED Triage Vitals   Temp Heart Rate Resp BP SpO2   09/27/24 1848 09/27/24 1846 09/27/24 1846 09/27/24 1848 09/27/24 1846   97.8 °F (36.6 °C) 85 18 133/56 99 %      Temp src Heart Rate Source Patient Position BP Location FiO2 (%)   09/27/24 1848 09/27/24 1846 09/27/24 1848 09/27/24 1846 --   Oral Monitor Sitting Right arm        Physical Exam  GENERAL:   Appears chronically ill  HENT: Nares patent.  Nasal cannula in place  EYES: No scleral icterus.  Mild conjunctival injection in the right eye without discharge  CV: Regular rhythm, regular rate.  RESPIRATORY: Normal effort.  Rhonchorous breath sounds  ABDOMEN: Soft, kaylah in the suprapubic area  MUSCULOSKELETAL: No deformities.   NEURO: Alert but intermittently confused in conversation no appreciated focal deficits, moves all extremities, follows commands.  SKIN: Warm, dry, no rash visualized.      LAB RESULTS  Recent Results (from the past 24 hour(s))   POC Glucose Once    Collection Time: 09/27/24  7:29 PM    Specimen: Blood   Result Value Ref Range    Glucose 115 70 - 130 mg/dL   Comprehensive Metabolic Panel    Collection Time: 09/27/24  7:30 PM    Specimen: Blood   Result Value Ref Range    Glucose 121 (H) 65 - 99 mg/dL    BUN 41 (H) 8 - 23 mg/dL    Creatinine 1.11 (H) 0.57 - 1.00 mg/dL    Sodium 144 136 - 145 mmol/L    Potassium 4.3 3.5 - 5.2 mmol/L    Chloride 101 98 - 107 mmol/L    CO2 33.0 (H) 22.0 - 29.0 mmol/L     Calcium 9.3 8.6 - 10.5 mg/dL    Total Protein 7.4 6.0 - 8.5 g/dL    Albumin 3.5 3.5 - 5.2 g/dL    ALT (SGPT) 49 (H) 1 - 33 U/L    AST (SGOT) 93 (H) 1 - 32 U/L    Alkaline Phosphatase 141 (H) 39 - 117 U/L    Total Bilirubin 0.2 0.0 - 1.2 mg/dL    Globulin 3.9 gm/dL    A/G Ratio 0.9 g/dL    BUN/Creatinine Ratio 36.9 (H) 7.0 - 25.0    Anion Gap 10.0 5.0 - 15.0 mmol/L    eGFR 51.3 (L) >60.0 mL/min/1.73   Single High Sensitivity Troponin T    Collection Time: 09/27/24  7:30 PM    Specimen: Blood   Result Value Ref Range    HS Troponin T 15 (H) <14 ng/L   Magnesium    Collection Time: 09/27/24  7:30 PM    Specimen: Blood   Result Value Ref Range    Magnesium 2.5 (H) 1.6 - 2.4 mg/dL   Green Top (Gel)    Collection Time: 09/27/24  7:30 PM   Result Value Ref Range    Extra Tube Hold for add-ons.    Lavender Top    Collection Time: 09/27/24  7:30 PM   Result Value Ref Range    Extra Tube hold for add-on    Gold Top - SST    Collection Time: 09/27/24  7:30 PM   Result Value Ref Range    Extra Tube Hold for add-ons.    Gray Top    Collection Time: 09/27/24  7:30 PM   Result Value Ref Range    Extra Tube Hold for add-ons.    Light Blue Top    Collection Time: 09/27/24  7:30 PM   Result Value Ref Range    Extra Tube Hold for add-ons.    CBC Auto Differential    Collection Time: 09/27/24  7:30 PM    Specimen: Blood   Result Value Ref Range    WBC 27.42 (H) 3.40 - 10.80 10*3/mm3    RBC 3.61 (L) 3.77 - 5.28 10*6/mm3    Hemoglobin 9.7 (L) 12.0 - 15.9 g/dL    Hematocrit 33.4 (L) 34.0 - 46.6 %    MCV 92.5 79.0 - 97.0 fL    MCH 26.9 26.6 - 33.0 pg    MCHC 29.0 (L) 31.5 - 35.7 g/dL    RDW 15.4 12.3 - 15.4 %    RDW-SD 52.6 37.0 - 54.0 fl    MPV 8.9 6.0 - 12.0 fL    Platelets 605 (H) 140 - 450 10*3/mm3    Neutrophil % 83.5 (H) 42.7 - 76.0 %    Lymphocyte % 7.1 (L) 19.6 - 45.3 %    Monocyte % 8.0 5.0 - 12.0 %    Eosinophil % 0.0 (L) 0.3 - 6.2 %    Basophil % 0.2 0.0 - 1.5 %    Immature Grans % 1.2 (H) 0.0 - 0.5 %    Neutrophils, Absolute  22.86 (H) 1.70 - 7.00 10*3/mm3    Lymphocytes, Absolute 1.96 0.70 - 3.10 10*3/mm3    Monocytes, Absolute 2.20 (H) 0.10 - 0.90 10*3/mm3    Eosinophils, Absolute 0.01 0.00 - 0.40 10*3/mm3    Basophils, Absolute 0.05 0.00 - 0.20 10*3/mm3    Immature Grans, Absolute 0.34 (H) 0.00 - 0.05 10*3/mm3    nRBC 0.0 0.0 - 0.2 /100 WBC   Lipase    Collection Time: 09/27/24  7:30 PM    Specimen: Blood   Result Value Ref Range    Lipase 28 13 - 60 U/L   BNP    Collection Time: 09/27/24  7:30 PM    Specimen: Blood   Result Value Ref Range    proBNP 667.9 0.0 - 1,800.0 pg/mL   Lactic Acid, Plasma    Collection Time: 09/27/24  7:30 PM    Specimen: Blood   Result Value Ref Range    Lactate 1.0 0.5 - 2.0 mmol/L   Procalcitonin    Collection Time: 09/27/24  7:30 PM    Specimen: Blood   Result Value Ref Range    Procalcitonin 0.09 0.00 - 0.25 ng/mL   C-reactive Protein    Collection Time: 09/27/24  7:30 PM    Specimen: Blood   Result Value Ref Range    C-Reactive Protein 8.74 (H) 0.00 - 0.50 mg/dL   Phosphorus    Collection Time: 09/27/24  7:30 PM    Specimen: Blood   Result Value Ref Range    Phosphorus 3.3 2.5 - 4.5 mg/dL   TSH    Collection Time: 09/27/24  7:30 PM    Specimen: Blood   Result Value Ref Range    TSH 0.908 0.270 - 4.200 uIU/mL   ECG 12 Lead ED Triage Standing Order; Weak / Dizzy / AMS    Collection Time: 09/27/24  7:32 PM   Result Value Ref Range    QT Interval 356 ms    QTC Interval 423 ms   Respiratory Panel PCR w/COVID-19(SARS-CoV-2) [facility] In-House, NP Swab in UTM/VTM, 2 HR TAT - Swab, Nasopharynx    Collection Time: 09/27/24  7:54 PM    Specimen: Nasopharynx; Swab   Result Value Ref Range    ADENOVIRUS, PCR Not Detected Not Detected    Coronavirus 229E Not Detected Not Detected    Coronavirus HKU1 Not Detected Not Detected    Coronavirus NL63 Not Detected Not Detected    Coronavirus OC43 Not Detected Not Detected    COVID19 Detected (C) Not Detected - Ref. Range    Human Metapneumovirus Not Detected  Not Detected    Human Rhinovirus/Enterovirus Not Detected Not Detected    Influenza A PCR Not Detected Not Detected    Influenza B PCR Not Detected Not Detected    Parainfluenza Virus 1 Not Detected Not Detected    Parainfluenza Virus 2 Not Detected Not Detected    Parainfluenza Virus 3 Not Detected Not Detected    Parainfluenza Virus 4 Not Detected Not Detected    RSV, PCR Not Detected Not Detected    Bordetella pertussis pcr Not Detected Not Detected    Bordetella parapertussis PCR Not Detected Not Detected    Chlamydophila pneumoniae PCR Not Detected Not Detected    Mycoplasma pneumo by PCR Not Detected Not Detected   Urinalysis With Microscopic If Indicated (No Culture) - Straight Cath    Collection Time: 09/27/24  8:31 PM    Specimen: Straight Cath; Urine   Result Value Ref Range    Color, UA Yellow Yellow, Straw    Appearance, UA Turbid (A) Clear    pH, UA 6.0 5.0 - 8.0    Specific Gravity, UA 1.022 1.001 - 1.030    Glucose, UA Negative Negative    Ketones, UA Trace (A) Negative    Bilirubin, UA Negative Negative    Blood, UA Large (3+) (A) Negative    Protein, UA >=300 mg/dL (3+) (A) Negative    Leuk Esterase, UA Large (3+) (A) Negative    Nitrite, UA Negative Negative    Urobilinogen, UA 0.2 E.U./dL 0.2 - 1.0 E.U./dL   Urinalysis, Microscopic Only - Straight Cath    Collection Time: 09/27/24  8:31 PM    Specimen: Straight Cath; Urine   Result Value Ref Range    RBC, UA Unable to determine due to loaded field (A) None Seen, 0-2 /HPF    WBC, UA Too Numerous to Count (A) None Seen, 0-2 /HPF    Bacteria, UA Trace None Seen, Trace /HPF    Squamous Epithelial Cells, UA Unable to determine due to loaded field (A) None Seen, 0-2 /HPF    Hyaline Casts, UA Unable to determine due to loaded field 0 - 6 /LPF    Methodology Manual Light Microscopy        If labs were ordered, I independently reviewed the results and considered them in treating the patient.        RADIOLOGY  CT Abdomen Pelvis With Contrast    Result Date:  9/27/2024  CT ABDOMEN PELVIS W CONTRAST Date of Exam: 9/27/2024 10:06 PM EDT Indication: Sepsis, altered mental status, tenderness, gross pyuria. Comparison: None available. Technique: Axial CT images were obtained of the abdomen and pelvis following the uneventful intravenous administration of Isovue-300, 80 mL IV. Reconstructed coronal and sagittal images were also obtained. Automated exposure control and iterative construction methods were used. Findings: Motion artifact limits diagnostic sensitivity. Lung Bases: Bilateral dependent atelectasis versus scarring is visualized. Peritoneum: No free intraperitoneal air or fluid. Abdominal wall: No evidence of abdominal hernia. There is suggestion of prior laparotomy. Liver: The liver is within normal limits in size and contour. Scattered punctate granulomas are visualized. The portal vein is patent. Biliary/Gallbladder: The gallbladder is surgically absent. The biliary tree is nondilated. Pancreas: Pancreas is within normal limits. There is no evidence of pancreatic mass or peripancreatic inflammatory changes. Spleen: Spleen is normal in size and contour. Scattered granulomas are visualized. Gastrointestinal/Mesentery: No evidence of bowel obstruction or gross inflammatory changes. The appendix appears within normal limits.  Adrenals: Adrenal glands are unremarkable. Kidneys: The kidneys are in anatomic position. Multiple nonobstructing right-sided renal calculi are visualized measuring up to 1 cm in the right renal pelvis. No evidence of hydronephrosis or significant perinephric fat stranding. Cyst is visualized in the lower  pole of the left kidney measuring 2.8 cm. Bladder: There is marked urinary bladder wall thickening and mucosal hyperenhancement. Air-filled tract is visualized in the anterior wall of the urinary bladder measuring 1.8 cm in length and 0.7 cm in width. Finding may be from prior suprapubic urinary bladder catheter placement. Small volume air is  visualized in the urinary bladder. Reproductive organs:  The patient is post hysterectomy. Lymph Nodes: No significant adenopathy is identified. Vasculature: Moderate aortoiliac atheromatous changes are visualized. The abdominal aorta is normal in caliber. Osseous Structures:  No acute fracture or aggressive lesions. Mild to moderate degenerative changes of the lumbar spine are visualized. There is levoscoliosis of the lumbar spine.     Impression: Motion artifact limits evaluation. Findings consistent with severe cystitis. There is a linear air-filled tract in the anterior wall of the urinary bladder measuring 1.8 cm in length and 0.7 cm in width. Finding may be from prior suprapubic urinary bladder catheter placement. Small volume  air is also visualized in the urinary bladder. If there is no history of recent catheterization or instrumentation, infection with gas-forming organism or occult vesicoenteric fistula is a possibility. Nonobstructing right-sided nephrolithiasis. Ancillary findings as above. Electronically Signed: Quinton Vasquez MD  9/27/2024 10:34 PM EDT  Workstation ID: [id number]    CT Head Without Contrast    Result Date: 9/27/2024  CT HEAD WO CONTRAST Date of Exam: 9/27/2024 8:57 PM EDT Indication: altered mental status, suspect metabolic. Comparison: None available. Technique: Axial CT images were obtained of the head without contrast administration.  Automated exposure control and iterative construction methods were used. Findings: No evidence of acute intracranial hemorrhage or mass effect. No extra-axial collection. The gray white matter differentiation is preserved. Global parenchymal atrophy. Periventricular and subcortical white matter hypodensity, nonspecific, but likely sequela of chronic small vessel ischemic disease. The mastoid air cells and paranasal sinuses are well aerated subtle opacification of the left sphenoid sinus. Globes and extraocular muscles are unremarkable. No acute or  suspicious osseous abnormality. Soft tissues within normal limits.     Impression: No evidence of acute intracranial abnormality. Typical chronic/age-related findings including brain atrophy and white matter change suggestive of chronic small vessel ischemia. Electronically Signed: Oziel Muniz MD  9/27/2024 9:20 PM EDT  Workstation ID: [id number]    XR Hand 3+ View Right    Result Date: 9/27/2024  XR HAND 3+ VW RIGHT Date of Exam: 9/27/2024 8:48 PM EDT Indication: pain with out known trauma,but significant dementia Comparison: None available. Findings: No evidence of acute fracture. No joint malalignment. Minimal scattered degenerative changes predominately involving the interphalangeal joints. Bones are demineralized. Soft tissues appear within normal limits.     Impression: No acute fracture or joint malalignment. Minimal scattered degenerative changes involving the interphalangeal joints. Bones are demineralized. Electronically Signed: Oziel Muniz MD  9/27/2024 8:56 PM EDT  Workstation ID: [id number]    XR Chest 1 View    Result Date: 9/27/2024  XR CHEST 1 VW Date of Exam: 9/27/2024 8:08 PM EDT Indication: Weak/Dizzy/AMS triage protocol Comparison: Noncontrast chest CT performed on March 23, 2024 Findings: No acute infiltrate. Stable calcified granulomas in the left upper lobe. Left basilar scarring is visualized with associated pleural thickening. Lungs are hyper aerated. There is no evidence of pneumothorax.  Linear lucency adjacent to the left heart border was noted on prior chest imaging, likely artifactual secondary to adjacent scarring. The cardiac and mediastinal silhouette appear unremarkable. Atheromatous aortic calcifications are visualized. No acute osseous abnormality identified.     Impression: No acute chest process evident. Changes of COPD Stable left upper lobe granulomas and left basilar scarring. Electronically Signed: Quinton Vasquez MD  9/27/2024 8:14 PM EDT  Workstation ID: [id number]     I  ordered and independently reviewed the above noted radiographic studies.      I viewed images of chest x-ray which showed emphysematous changes but no acute pathology that I can appreciate per my independent interpretation.  Hand x-ray only showing arthritic changes.  Head CT which was negative for acute bleeds masses infarctions or other abnormalities  CT scan of the abdomen pelvis showing impressive bladder wall thickening and air in the bladder on my evaluation.    See radiologist's dictation for official interpretation.        PROCEDURES    Procedures    ECG 12 Lead ED Triage Standing Order; Weak / Dizzy / AMS   Preliminary Result   Test Reason : ED Triage Standing Order~   Blood Pressure :   */*   mmHG   Vent. Rate :  85 BPM     Atrial Rate :  85 BPM      P-R Int : 116 ms          QRS Dur :  66 ms       QT Int : 356 ms       P-R-T Axes :  82  64  59 degrees      QTc Int : 423 ms      Normal sinus rhythm   Right atrial enlargement   ST & T wave abnormality, consider anterior ischemia   Abnormal ECG   When compared with ECG of 23-MAR-2024 16:46,   T wave inversion now evident in Anterior leads      Referred By: EDMD           Confirmed By:           MEDICATIONS GIVEN IN ER    Medications   sodium chloride 0.9 % flush 10 mL (has no administration in time range)   ipratropium-albuterol (DUO-NEB) nebulizer solution 3 mL (3 mL Nebulization Not Given 9/28/24 0154)   sodium chloride 0.9 % flush 10 mL (has no administration in time range)   sodium chloride 0.9 % flush 10 mL (has no administration in time range)   sodium chloride 0.9 % infusion 40 mL (has no administration in time range)   Potassium Replacement - Follow Nurse / BPA Driven Protocol (has no administration in time range)   Magnesium Standard Dose Replacement - Follow Nurse / BPA Driven Protocol (has no administration in time range)   Phosphorus Replacement - Follow Nurse / BPA Driven Protocol (has no administration in time range)   Calcium Replacement -  Follow Nurse / BPA Driven Protocol (has no administration in time range)   acetaminophen (TYLENOL) tablet 650 mg (has no administration in time range)     Or   acetaminophen (TYLENOL) 160 MG/5ML oral solution 650 mg (has no administration in time range)     Or   acetaminophen (TYLENOL) suppository 650 mg (has no administration in time range)   sennosides-docusate (PERICOLACE) 8.6-50 MG per tablet 2 tablet (has no administration in time range)     And   polyethylene glycol (MIRALAX) packet 17 g (has no administration in time range)     And   bisacodyl (DULCOLAX) EC tablet 5 mg (has no administration in time range)     And   bisacodyl (DULCOLAX) suppository 10 mg (has no administration in time range)   Pharmacy to dose vancomycin (has no administration in time range)   dexAMETHasone (DECADRON) injection 4 mg (has no administration in time range)   ziprasidone (GEODON) injection 10 mg (has no administration in time range)   piperacillin-tazobactam (ZOSYN) 3.375 g IVPB in 100 mL NS MBP (CD) (has no administration in time range)   vancomycin (dosing per levels) (has no administration in time range)   sodium chloride 0.9 % bolus 1,632 mL (0 mL Intravenous Stopped 9/28/24 0049)   Vancomycin HCl 1,250 mg in sodium chloride 0.9 % 250 mL VTB (0 mg Intravenous Stopped 9/28/24 0049)   piperacillin-tazobactam (ZOSYN) 3.375 g IVPB in 100 mL NS MBP (CD) (0 g Intravenous Stopped 9/27/24 2245)   iopamidol (ISOVUE-300) 61 % injection 80 mL (80 mL Intravenous Given 9/27/24 2222)         MEDICAL DECISION MAKING, PROGRESS, and CONSULTS    All labs, if obtained, have been independently reviewed by me.  All radiology studies, if obtained, have been reviewed by me and the radiologist dictating the report.  All EKG's, if obtained, have been independently viewed and interpreted by me/my attending physician.      Discussion below represents my analysis of pertinent findings related to patient's condition, differential diagnosis, treatment  plan and final disposition.                         Differential diagnosis:    Stroke, sepsis, encephalopathy, urinary tract infection, bacteremia, intra-abdominal infection, pneumonia, COVID, flu, viral URI, anemia, electrolyte abnormality      Additional sources:    - Discussed/ obtained information from independent historians: EMS    - External (non-ED) record review:  Chart review of previous hospitalization for respiratory failure shows history of:   COPD on 2L O2, anxiety, HTN, HLD    - Chronic or social conditions impacting care: COPD with chronic hypoxia, hypertension, anxiety    - Shared decision making: Patient/patient representative in complete agreement with current plans for evaluation and management.      Orders placed during this visit:  Orders Placed This Encounter   Procedures    COVID PRE-OP / PRE-PROCEDURE SCREENING ORDER (NO ISOLATION) - Swab, Nasopharynx    Blood Culture - Blood,    Blood Culture - Blood,    Respiratory Panel PCR w/COVID-19(SARS-CoV-2) [facility], NP Swab in UTM/VTM, 2 HR TAT - Swab, Nasopharynx    Urine Culture - Urine,    XR Chest 1 View    CT Head Without Contrast    XR Hand 3+ View Right    CT Abdomen Pelvis With Contrast    [place] Draw    Comprehensive Metabolic Panel    Single High Sensitivity Troponin T    Magnesium    CBC Auto Differential    Urinalysis With Microscopic If Indicated (No Culture) - Urine, Catheter    Lipase    BNP    Lactic Acid, Plasma    Procalcitonin    C-reactive Protein    Phosphorus    TSH    Urinalysis, Microscopic Only - Urine, Clean Catch    Basic Metabolic Panel    Magnesium    Phosphorus    CBC Auto Differential    Vancomycin, Random    NPO Diet NPO Type: Strict NPO    Undress & Gown    Vital Signs    Vital Signs    Intake & Output    Weigh Patient    Oral Care    Saline Lock & Maintain IV Access    Place Sequential Compression Device    Maintain Sequential Compression Device    Continuous Pulse Oximetry    Up With  Assistance    Strict Intake & Output    Daily Weights    Nursing Dysphagia Screening (Complete Prior to Giving Anything By Mouth)    Code Status and Medical Interventions: CPR (Attempt to Resuscitate); Full Support    Inpatient Urology Consult    Oxygen Therapy- Nasal Cannula; Titrate 1-6 LPM Per SpO2; 90 - 95%    Incentive Spirometry    POC Glucose Once    POC Glucose Once    ECG 12 Lead ED Triage Standing Order; Weak / Dizzy / AMS    Insert Peripheral IV    Insert Peripheral IV    Initiate Observation Status    Fall Precautions    CBC & Differential    Green Top (Gel)    Lavender Top    Gold Top - SST    Gray Top    Light Blue Top    CBC & Differential         Additional orders considered but not ordered:      ED Course:    Consultants: Hospital medicine    ED Course as of 09/28/24 0204   Sat Sep 28, 2024   0000 Chart review of previous hospitalization for respiratory failure shows history of:   COPD on 2L O2, anxiety, HTN, HLD [CC]   0201 This is a 78-year-old woman sent from nursing facility for altered mental status, concern for UTI and conjunctival injection in the right eye EMS report that she has a baseline mental status of being alert and oriented x 1.  Upon arrival here patient is able to answer most questions appropriately she does say that she is having some pain in her right hand and wrist does not know of any trauma.  She is also having suprapubic pain and dysuria.  No other complaints at this time. [CC]   0202 Patient arrived awake and alert but she is intermittently confused in conversation unclear whether this is baseline or not.  She is satting well on her baseline oxygen, chronically ill-appearing does have suprapubic tenderness.  Very faint conjunctival injection of the right eye likely of viral or allergic conjunctivitis no discharge.  Initiated full infectious and altered mental status workup.  CBC concerning for profound leukocytosis of 27,000 started empirically on sepsis fluid bolus and  broad-spectrum antibiotics with vancomycin and Zosyn.  Mild hypomagnesemia will replete.  Viral panel is positive for COVID-19 but I do not think this explains the profound leukocytosis.  Urinalysis is grossly infected with gross pyuria and hematuria have added on a CT scan of the abdomen pelvis head CT did not show any abnormalities hand x-ray was negative and chest x-ray not showing any acute pneumonia.  CT scan of the abdomen pelvis showing impressive bladder thickening and gas in the bladder concerning for cystitis formal overread comments on possible occult colovesical fistula will need urology evaluation.  This point is apparent she needs hospitalization.  I discussed this with the patient she seems to be sundowning she has become more agitated and angry, given a small dose of Seroquel.  Medicine team consulted for admission. [CC]      ED Course User Index  [CC] Hiro Howell MD       40 minutes of critical care provided. This time excludes other billable procedures. Time does include preparation of documents, medical consultations, review of old records, and direct bedside care. Patient is at high risk for life-threatening deterioration due to sepsis.         Shared Decision Making:  After my consideration of clinical presentation and any laboratory/radiology studies obtained, I discussed the findings with the patient/patient representative who is in agreement with the treatment plan and the final disposition.   Risks and benefits of discharge and/or observation/admission were discussed.       AS OF 02:04 EDT VITALS:    BP - 123/47  HR - 86  TEMP - 97.8 °F (36.6 °C) (Oral)  O2 SATS - 98%                  DIAGNOSIS  Final diagnoses:   Sepsis without acute organ dysfunction, due to unspecified organism   Acute UTI (urinary tract infection)   Chronic obstructive pulmonary disease, unspecified COPD type         DISPOSITION  Admit      Please note that portions of this document were completed with voice  recognition software.        Hiro Howell MD  09/28/24 0202

## 2024-09-28 NOTE — CONSULTS
Consult    Patient Name: Beckie Romeo  Medical Record Number: [id number]  YOB: 1945    Date of consultation: 9/28/2024    Referring Provider:No ref. provider found Marty Price DO  Reason for Consultation: Cystitis, possible bladder fistula    Patient Care Team:  Emily De León MD as PCP - General (Internal Medicine)    Chief complaint   Chief Complaint   Patient presents with    Eye Pain       Subjective .     History of present illness:    78-year-old female, SNF patient at the [facility], with a history of COPD, HTN, hypothyroidism, UTIs, and anxiety who was brought to the emergency room via EMS for altered mental status and aggression.  She apparently complained of suprapubic pain and pain in her right wrist.  A CT scan with contrast showed left renal cyst, possible right renal stone, severe bladder wall thickening, cystitis, and anterior bladder wall 1.8 cm x 7 mm fistulous tract with gas.  A chest x-ray showed changes of COPD and left upper lobe granulomas.  Labs included WBC 27.42, hemoglobin 9.7, creatinine 1.11, and urinalysis nitrite negative, WBC TNTC, and questionable bacteria.  She is COVID-positive.  She started Zosyn and vancomycin.  She was given Geodon and is sleeping.  She is unable to give any history.    Past Medical History:   Diagnosis Date    Age-related physical debility     Allergic rhinitis     Anemia     Atherosclerotic heart disease of native coronary artery without angina pectoris     Cognitive communication deficit     Contact with and (suspected) exposure to covid-19     COPD (chronic obstructive pulmonary disease)     Cough     Depression     Diarrhea, unspecified     Disease of thyroid gland     Flatulence     GERD (gastroesophageal reflux disease)     Hyperlipidemia     Hypertension     Insomnia     Major depressive disorder with single episode     Nausea with vomiting     Other specified anxiety disorders     Paroxysmal A-fib     Polyneuropathy      Sciatica of left side     Sciatica of right side     Unspecified protein-calorie malnutrition      History reviewed. No pertinent surgical history.  Family History   Problem Relation Age of Onset    No Known Problems Mother     No Known Problems Father      Social History     Tobacco Use    Smoking status: Former     Types: Cigarettes    Smokeless tobacco: Never   Vaping Use    Vaping status: Former    Passive vaping exposure: Yes   Substance Use Topics    Alcohol use: Never    Drug use: Never     Medications Prior to Admission   Medication Sig Dispense Refill Last Dose    acetaminophen (TYLENOL) 325 MG tablet Take 2 tablets by mouth 2 (Two) Times a Day.       albuterol sulfate  (90 Base) MCG/ACT inhaler Inhale 2 puffs Every 6 (Six) Hours As Needed for Wheezing.       ALPRAZolam (XANAX) 0.25 MG tablet Take 1 tablet by mouth 2 (Two) Times a Day. 6 tablet 0     Artificial Tear Solution (GenTeal Tears) 0.1-0.2-0.3 % solution Apply 2 drops to eye(s) as directed by provider 3 (Three) Times a Day. 1-2 drops in bilateral eyes       aspirin 81 MG EC tablet Take 1 tablet by mouth Daily.       baclofen (LIORESAL) 10 MG tablet Take 1 tablet by mouth 3 (Three) Times a Day As Needed for Muscle Spasms.       busPIRone (BUSPAR) 5 MG tablet Take 2 tablets by mouth 2 (Two) Times a Day. 60 tablet 0     carvedilol (COREG) 12.5 MG tablet Take 1 tablet by mouth 2 (Two) Times a Day With Meals. Hold if BP less than 110/60 or HR <60/       cholecalciferol 25 MCG (1000 UT) tablet Take 2 tablets by mouth Daily.       Diclofenac Sodium (VOLTAREN) 1 % gel gel Apply 2 g topically to the appropriate area as directed 4 (Four) Times a Day As Needed (left hand pain).       fluticasone (FLONASE) 50 MCG/ACT nasal spray 2 sprays into the nostril(s) as directed by provider Daily.       Fluticasone Furoate-Vilanterol (Breo Ellipta) 200-25 MCG/ACT inhaler Inhale 1 puff Daily.       gabapentin (NEURONTIN) 300 MG capsule Take 1 capsule by mouth 3  "(Three) Times a Day. 9 capsule 0     GUAIFENESIN ER PO Take 600 mg by mouth 2 (Two) Times a Day.       ipratropium-albuterol (DUO-NEB) 0.5-2.5 mg/3 ml nebulizer Take 3 mL by nebulization 3 (Three) Times a Day As Needed (cough, congestion).       levothyroxine (SYNTHROID, LEVOTHROID) 25 MCG tablet Take 1 tablet by mouth Every Morning.       loperamide (IMODIUM) 2 MG capsule Take 1 capsule by mouth 4 (Four) Times a Day As Needed for Diarrhea. Give 2 tabs after 1st loose stool; give 1 tablet for any loose stools after. Max dose 4 tablets within a 24 hr period.       Loratadine (CLARITIN PO) Take 10 mg by mouth Every Night.       magnesium oxide (MAG-OX) 400 MG tablet Take 1 tablet by mouth Daily.       melatonin 1 MG tablet Take 2 tablets by mouth Every Night.       mirtazapine (REMERON) 15 MG tablet Take 0.5 tablets by mouth Every Night.       ondansetron (ZOFRAN) 4 MG tablet Take 1 tablet by mouth 4 (Four) Times a Day As Needed for Nausea or Vomiting.       pantoprazole (PROTONIX) 40 MG EC tablet Take 1 tablet by mouth Daily.       POTASSIUM & SODIUM PHOSPHATES PO Take 250 mg by mouth Daily. 250mg - 45 mg - 298 mg tablet       sertraline (ZOLOFT) 100 MG tablet Take 1 tablet by mouth Daily.       traMADol (ULTRAM) 50 MG tablet Take 1 tablet by mouth Every 6 (Six) Hours As Needed for Moderate Pain. 18 tablet 0     VITAMIN E 400 UNIT capsule Take 1 capsule by mouth Daily.        Allergies:  Codeine and Zanaflex [tizanidine hcl]    Review of Systems  Review of systems could not be obtained due to   patient confusion. patient sedation status.    Objective     Vital Signs   /45 (BP Location: Left arm, Patient Position: Lying)   Pulse 85   Temp 98 °F (36.7 °C) (Axillary)   Resp 18   Ht 165.1 cm (65\")   Wt 40.8 kg (90 lb)   SpO2 97%   BMI 14.98 kg/m²     Physical Exam:  General Appearance: No acute distress, sleeping in bed, appears comfortable  Lungs: Respirations regular, even and  unlabored  Heart: Regular " rhythm and normal rate  Abdomen: Nondistended, low midline scar, no mass    Results Review:  Lab Results (last 24 hours)       Procedure Component Value Units Date/Time    Urine Culture - Urine, Straight Cath [[id number]] Collected: 09/27/24 2031    Specimen: Urine from Straight Cath Updated: 09/27/24 2137    Blood Culture - Blood, Arm, Right [[id number]] Collected: 09/27/24 1954    Specimen: Blood from Arm, Right Updated: 09/27/24 2124    Blood Culture - Blood, Arm, Left [[id number]] Collected: 09/27/24 2107    Specimen: Blood from Arm, Left Updated: 09/27/24 2122    COVID PRE-OP / PRE-PROCEDURE SCREENING ORDER (NO ISOLATION) - Swab, Nasopharynx [[id number]]  (Abnormal) Collected: 09/27/24 1954    Specimen: Swab from Nasopharynx Updated: 09/27/24 2059    Narrative:      The following orders were created for panel order COVID PRE-OP / PRE-PROCEDURE SCREENING ORDER (NO ISOLATION) - Swab, Nasopharynx.  Procedure                               Abnormality         Status                     ---------                               -----------         ------                     Respiratory Panel PCR w/...[[id number]]  Abnormal            Final result                 Please view results for these tests on the individual orders.    Respiratory Panel PCR w/COVID-19(SARS-CoV-2) [facility] In-House, NP Swab in UTM/VTM, 2 HR TAT - Swab, Nasopharynx [[id number]]  (Abnormal) Collected: 09/27/24 1954    Specimen: Swab from Nasopharynx Updated: 09/27/24 2059     ADENOVIRUS, PCR Not Detected     Coronavirus 229E Not Detected     Coronavirus HKU1 Not Detected     Coronavirus NL63 Not Detected     Coronavirus OC43 Not Detected     COVID19 Detected     Human Metapneumovirus Not Detected     Human Rhinovirus/Enterovirus Not Detected     Influenza A PCR Not Detected     Influenza B PCR Not Detected     Parainfluenza Virus 1 Not Detected     Parainfluenza Virus 2 Not Detected     Parainfluenza Virus 3 Not Detected     Parainfluenza  Virus 4 Not Detected     RSV, PCR Not Detected     Bordetella pertussis pcr Not Detected     Bordetella parapertussis PCR Not Detected     Chlamydophila pneumoniae PCR Not Detected     Mycoplasma pneumo by PCR Not Detected    Narrative:      In the setting of a positive respiratory panel with a viral infection PLUS a negative procalcitonin without other underlying concern for bacterial infection, consider observing off antibiotics or discontinuation of antibiotics and continue supportive care. If the respiratory panel is positive for atypical bacterial infection (Bordetella pertussis, Chlamydophila pneumoniae, or Mycoplasma pneumoniae), consider antibiotic de-escalation to target atypical bacterial infection.    Urinalysis With Microscopic If Indicated (No Culture) - Straight Cath [[id number]]  (Abnormal) Collected: 09/27/24 2031    Specimen: Urine from Straight Cath Updated: 09/27/24 2057     Color, UA Yellow     Appearance, UA Turbid     pH, UA 6.0     Specific Gravity, UA 1.022     Glucose, UA Negative     Ketones, UA Trace     Bilirubin, UA Negative     Blood, UA Large (3+)     Protein, UA >=300 mg/dL (3+)     Leuk Esterase, UA Large (3+)     Nitrite, UA Negative     Urobilinogen, UA 0.2 E.U./dL    Urinalysis, Microscopic Only - Straight Cath [[id number]]  (Abnormal) Collected: 09/27/24 2031    Specimen: Urine from Straight Cath Updated: 09/27/24 2057     RBC, UA       Unable to determine due to loaded field     /HPF     WBC, UA Too Numerous to Count /HPF      Bacteria, UA Trace /HPF      Squamous Epithelial Cells, UA       Unable to determine due to loaded field     /HPF     Hyaline Casts, UA       Unable to determine due to loaded field     /LPF     Methodology Manual Light Microscopy    BNP [[id number]]  (Normal) Collected: 09/27/24 1930    Specimen: Blood Updated: 09/27/24 2023     proBNP 667.9 pg/mL     Narrative:      This assay is used as an aid in the diagnosis of individuals suspected of having heart  "failure. It can be used as an aid in the diagnosis of acute decompensated heart failure (ADHF) in patients presenting with signs and symptoms of ADHF to the emergency department (ED). In addition, NT-proBNP of <300 pg/mL indicates ADHF is not likely.    Age Range Result Interpretation  NT-proBNP Concentration (pg/mL:      <50             Positive            >450                   Gray                 300-450                    Negative             <300    50-75           Positive            >900                  Gray                300-900                  Negative            <300      >75             Positive            >1800                  Gray                300-1800                  Negative            <300    Procalcitonin [[id number]]  (Normal) Collected: 09/27/24 1930    Specimen: Blood Updated: 09/27/24 2023     Procalcitonin 0.09 ng/mL     Narrative:      As a Marker for Sepsis (Non-Neonates):    1. <0.5 ng/mL represents a low risk of severe sepsis and/or septic shock.  2. >2 ng/mL represents a high risk of severe sepsis and/or septic shock.    As a Marker for Lower Respiratory Tract Infections that require antibiotic therapy:    PCT on Admission    Antibiotic Therapy       6-12 Hrs later    >0.5                Strongly Recommended  >0.25 - <0.5        Recommended   0.1 - 0.25          Discouraged              Remeasure/reassess PCT  <0.1                Strongly Discouraged     Remeasure/reassess PCT    As 28 day mortality risk marker: \"Change in Procalcitonin Result\" (>80% or <=80%) if Day 0 (or Day 1) and Day 4 values are available. Refer to http://www.[employer]-pct-calculator.com    Change in PCT <=80%  A decrease of PCT levels below or equal to 80% defines a positive change in PCT test result representing a higher risk for 28-day all-cause mortality of patients diagnosed with severe sepsis for septic shock.    Change in PCT >80%  A decrease of PCT levels of more than 80% defines a negative change in PCT " result representing a lower risk for 28-day all-cause mortality of patients diagnosed with severe sepsis or septic shock.       TSH [[id number]]  (Normal) Collected: 09/27/24 1930    Specimen: Blood Updated: 09/27/24 2023     TSH 0.908 uIU/mL     Lipase [[id number]]  (Normal) Collected: 09/27/24 1930    Specimen: Blood Updated: 09/27/24 2017     Lipase 28 U/L     C-reactive Protein [[id number]]  (Abnormal) Collected: 09/27/24 1930    Specimen: Blood Updated: 09/27/24 2017     C-Reactive Protein 8.74 mg/dL     Phosphorus [[id number]]  (Normal) Collected: 09/27/24 1930    Specimen: Blood Updated: 09/27/24 2017     Phosphorus 3.3 mg/dL     Lactic Acid, Plasma [[id number]]  (Normal) Collected: 09/27/24 1930    Specimen: Blood Updated: 09/27/24 2010     Lactate 1.0 mmol/L      Comment: Falsely depressed results may occur on samples drawn from patients receiving N-Acetylcysteine (NAC) or Metamizole.       Magnesium [[id number]]  (Abnormal) Collected: 09/27/24 1930    Specimen: Blood Updated: 09/27/24 2002     Magnesium 2.5 mg/dL     Comprehensive Metabolic Panel [[id number]]  (Abnormal) Collected: 09/27/24 1930    Specimen: Blood Updated: 09/27/24 2002     Glucose 121 mg/dL      BUN 41 mg/dL      Creatinine 1.11 mg/dL      Sodium 144 mmol/L      Potassium 4.3 mmol/L      Chloride 101 mmol/L      CO2 33.0 mmol/L      Calcium 9.3 mg/dL      Total Protein 7.4 g/dL      Albumin 3.5 g/dL      ALT (SGPT) 49 U/L      AST (SGOT) 93 U/L      Alkaline Phosphatase 141 U/L      Total Bilirubin 0.2 mg/dL      Globulin 3.9 gm/dL      Comment: Calculated Result        A/G Ratio 0.9 g/dL      BUN/Creatinine Ratio 36.9     Anion Gap 10.0 mmol/L      eGFR 51.3 mL/min/1.73     Narrative:      GFR Normal >60  Chronic Kidney Disease <60  Kidney Failure <15    The GFR formula is only valid for adults with stable renal function between ages 18 and 70.    Single High Sensitivity Troponin T [[id number]]  (Abnormal) Collected: 09/27/24 1930    Specimen:  Blood Updated: 09/27/24 1959     HS Troponin T 15 ng/L     Narrative:      High Sensitive Troponin T Reference Range:  <14.0 ng/L- Negative Female for AMI  <22.0 ng/L- Negative Male for AMI  >=14 - Abnormal Female indicating possible myocardial injury.  >=22 - Abnormal Male indicating possible myocardial injury.   Clinicians would have to utilize clinical acumen, EKG, Troponin, and serial changes to determine if it is an Acute Myocardial Infarction or myocardial injury due to an underlying chronic condition.         CBC & Differential [[id number]]  (Abnormal) Collected: 09/27/24 1930    Specimen: Blood Updated: 09/1945    Narrative:      The following orders were created for panel order CBC & Differential.  Procedure                               Abnormality         Status                     ---------                               -----------         ------                     CBC Auto Differential[[id number]]        Abnormal            Final result                 Please view results for these tests on the individual orders.    CBC Auto Differential [[id number]]  (Abnormal) Collected: 09/27/24 1930    Specimen: Blood Updated: 09/1945     WBC 27.42 10*3/mm3      RBC 3.61 10*6/mm3      Hemoglobin 9.7 g/dL      Hematocrit 33.4 %      MCV 92.5 fL      MCH 26.9 pg      MCHC 29.0 g/dL      RDW 15.4 %      RDW-SD 52.6 fl      MPV 8.9 fL      Platelets 605 10*3/mm3      Neutrophil % 83.5 %      Lymphocyte % 7.1 %      Monocyte % 8.0 %      Eosinophil % 0.0 %      Basophil % 0.2 %      Immature Grans % 1.2 %      Neutrophils, Absolute 22.86 10*3/mm3      Lymphocytes, Absolute 1.96 10*3/mm3      Monocytes, Absolute 2.20 10*3/mm3      Eosinophils, Absolute 0.01 10*3/mm3      Basophils, Absolute 0.05 10*3/mm3      Immature Grans, Absolute 0.34 10*3/mm3      nRBC 0.0 /100 WBC     [facility] Draw [[id number]] Collected: 09/27/24 1930    Specimen: Blood Updated: 09/27/24 1945    Narrative:      The following orders were  created for panel order [place] Draw.  Procedure                               Abnormality         Status                     ---------                               -----------         ------                     Green Top (Gel)[[id number]]                                  Final result               Lavender Top[[id number]]                                     Final result               Gold Top - SST[[id number]]                                   Final result               French Top[[id number]]                                         Final result               Light Blue Top[[id number]]                                   Final result                 Please view results for these tests on the individual orders.    Green Top (Gel) [[id number]] Collected: 09/27/24 1930    Specimen: Blood Updated: 09/27/24 1945     Extra Tube Hold for add-ons.     Comment: Auto resulted.       Lavender Top [[id number]] Collected: 09/27/24 1930    Specimen: Blood Updated: 09/27/24 1945     Extra Tube hold for add-on     Comment: Auto resulted       Gold Top - SST [[id number]] Collected: 09/27/24 1930    Specimen: Blood Updated: 09/27/24 1945     Extra Tube Hold for add-ons.     Comment: Auto resulted.       Gray Top [[id number]] Collected: 09/27/24 1930    Specimen: Blood Updated: 09/27/24 1945     Extra Tube Hold for add-ons.     Comment: Auto resulted.       Light Blue Top [[id number]] Collected: 09/27/24 1930    Specimen: Blood Updated: 09/27/24 1945     Extra Tube Hold for add-ons.     Comment: Auto resulted       POC Glucose Once [[id number]]  (Normal) Collected: 09/27/24 1929    Specimen: Blood Updated: 09/27/24 1930     Glucose 115 mg/dL           Imaging Results (Last 72 Hours)       Procedure Component Value Units Date/Time    CT Abdomen Pelvis With Contrast [[id number]] Collected: 09/27/24 2226     Updated: 09/27/24 2237    Narrative:      CT ABDOMEN PELVIS W CONTRAST    Date of Exam: 9/27/2024 10:06 PM EDT    Indication: Sepsis, altered  mental status, tenderness, gross pyuria.    Comparison: None available.    Technique: Axial CT images were obtained of the abdomen and pelvis following the uneventful intravenous administration of Isovue-300, 80 mL IV. Reconstructed coronal and sagittal images were also obtained. Automated exposure control and iterative   construction methods were used.      Findings:    Motion artifact limits diagnostic sensitivity.    Lung Bases:  Bilateral dependent atelectasis versus scarring is visualized.    Peritoneum:  No free intraperitoneal air or fluid.     Abdominal wall:  No evidence of abdominal hernia. There is suggestion of prior laparotomy.    Liver:  The liver is within normal limits in size and contour. Scattered punctate granulomas are visualized. The portal vein is patent.    Biliary/Gallbladder:  The gallbladder is surgically absent. The biliary tree is nondilated.    Pancreas:  Pancreas is within normal limits. There is no evidence of pancreatic mass or peripancreatic inflammatory changes.    Spleen:  Spleen is normal in size and contour. Scattered granulomas are visualized.    Gastrointestinal/Mesentery:   No evidence of bowel obstruction or gross inflammatory changes. The appendix appears within normal limits.      Adrenals:  Adrenal glands are unremarkable.    Kidneys:  The kidneys are in anatomic position. Multiple nonobstructing right-sided renal calculi are visualized measuring up to 1 cm in the right renal pelvis. No evidence of hydronephrosis or significant perinephric fat stranding. Cyst is visualized in the lower   pole of the left kidney measuring 2.8 cm.    Bladder:   There is marked urinary bladder wall thickening and mucosal hyperenhancement. Air-filled tract is visualized in the anterior wall of the urinary bladder measuring 1.8 cm in length and 0.7 cm in width. Finding may be from prior suprapubic urinary bladder   catheter placement. Small volume air is visualized in the urinary  bladder.    Reproductive organs:    The patient is post hysterectomy.    Lymph Nodes:  No significant adenopathy is identified.     Vasculature:  Moderate aortoiliac atheromatous changes are visualized. The abdominal aorta is normal in caliber.    Osseous Structures:    No acute fracture or aggressive lesions. Mild to moderate degenerative changes of the lumbar spine are visualized. There is levoscoliosis of the lumbar spine.        Impression:      Impression:    Motion artifact limits evaluation.    Findings consistent with severe cystitis. There is a linear air-filled tract in the anterior wall of the urinary bladder measuring 1.8 cm in length and 0.7 cm in width. Finding may be from prior suprapubic urinary bladder catheter placement. Small volume   air is also visualized in the urinary bladder. If there is no history of recent catheterization or instrumentation, infection with gas-forming organism or occult vesicoenteric fistula is a possibility.    Nonobstructing right-sided nephrolithiasis.    Ancillary findings as above.        Electronically Signed: Quinton Vasquez MD    9/27/2024 10:34 PM EDT    Workstation ID: [id number]    CT Head Without Contrast [[id number]] Collected: 09/27/24 2115     Updated: 09/27/24 2123    Narrative:      CT HEAD WO CONTRAST    Date of Exam: 9/27/2024 8:57 PM EDT    Indication: altered mental status, suspect metabolic.    Comparison: None available.    Technique: Axial CT images were obtained of the head without contrast administration.  Automated exposure control and iterative construction methods were used.    Findings:  No evidence of acute intracranial hemorrhage or mass effect. No extra-axial collection. The gray white matter differentiation is preserved. Global parenchymal atrophy. Periventricular and subcortical white matter hypodensity, nonspecific, but likely   sequela of chronic small vessel ischemic disease.    The mastoid air cells and paranasal sinuses are well aerated  subtle opacification of the left sphenoid sinus. Globes and extraocular muscles are unremarkable. No acute or suspicious osseous abnormality. Soft tissues within normal limits.      Impression:      Impression:  No evidence of acute intracranial abnormality.    Typical chronic/age-related findings including brain atrophy and white matter change suggestive of chronic small vessel ischemia.      Electronically Signed: Oziel Muniz MD    9/27/2024 9:20 PM EDT    Workstation ID: [id number]    XR Hand 3+ View Right [[id number]] Collected: 09/27/24 2053     Updated: 09/27/24 2059    Narrative:      XR HAND 3+ VW RIGHT    Date of Exam: 9/27/2024 8:48 PM EDT    Indication: pain with out known trauma,but significant dementia    Comparison: None available.    Findings:  No evidence of acute fracture. No joint malalignment. Minimal scattered degenerative changes predominately involving the interphalangeal joints. Bones are demineralized. Soft tissues appear within normal limits.      Impression:      Impression:  No acute fracture or joint malalignment.    Minimal scattered degenerative changes involving the interphalangeal joints.    Bones are demineralized.      Electronically Signed: Oziel Muniz MD    9/27/2024 8:56 PM EDT    Workstation ID: [id number]    XR Chest 1 View [[id number]] Collected: 09/27/24 2010     Updated: 09/27/24 2017    Narrative:      XR CHEST 1 VW    Date of Exam: 9/27/2024 8:08 PM EDT    Indication: Weak/Dizzy/AMS triage protocol    Comparison: Noncontrast chest CT performed on March 23, 2024    Findings:  No acute infiltrate. Stable calcified granulomas in the left upper lobe. Left basilar scarring is visualized with associated pleural thickening. Lungs are hyper aerated. There is no evidence of pneumothorax.  Linear lucency adjacent to the left heart   border was noted on prior chest imaging, likely artifactual secondary to adjacent scarring. The cardiac and mediastinal silhouette appear unremarkable.  Atheromatous aortic calcifications are visualized. No acute osseous abnormality identified.      Impression:      Impression:    No acute chest process evident.    Changes of COPD    Stable left upper lobe granulomas and left basilar scarring.      Electronically Signed: Quinton Vasquez MD    9/27/2024 8:14 PM EDT    Workstation ID: [id number]             I reviewed the patient's new clinical results.  I reviewed the patient's new imaging results and agree with the interpretation.      Assessment and Recommendations  78-year-old female, SNF patient at the [facility], with a history of COPD, HTN, hypothyroidism, and UTIs.    She was admitted for AMS, UTI, sepsis, cystitis, and possible bladder fistula. A CT scan with contrast showed left renal cyst, possible right renal stone, severe bladder wall thickening, cystitis, and anterior bladder wall 1.8 cm x 7 mm fistulous tract with gas.    On exam, she has a low midline scar presumably from hysterectomy.  There is no reported history of diverticulitis.  There is low likelihood of fistula to the bowel.      Plan: Continue broad-spectrum antibiotics pending cultures, currently Zosyn and vancomycin.  I will arrange cystoscopy for further evaluation when stable.        Sepsis      I discussed the patients findings and my recommendations with the nurse.    Teo Galindo MD  09/28/24  06:33 EDT

## 2024-09-28 NOTE — PROGRESS NOTES
"      [facility] Medicine Services  ADMISSION FOLLOW-UP NOTE          Patient admitted after midnight, H&P by my partner performed earlier on today's date reviewed.  Interim findings, labs, and charting also reviewed.        [facility] Problem List has been managed and updated to include any new diagnoses:  Active Hospital Problems    Diagnosis  POA    **Sepsis [A41.9]  Yes    COVID-19 virus infection [U07.1]  Yes    Metabolic encephalopathy [G93.41]  Yes    Anxiety disorder due to known physiological condition [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         ADDITIONAL PLAN:  - detailed assessment and plan from admission reviewed  - Patient admitted this AM by Dr. Stef Ferguson, chart reviewed, patient seen briefly at bedside  -Summary: This is a 77 y/o female resident of the [facility] w/ COPD (2lpm NC), HTN, HLD, hypothyroidism, GERD, cognitive deficit (A&O x1 at baseline per triage note), anxiety d/o w/ ?panic attacks, seen at [facility] ED 9/13/24 w/ low back pain radiating to her urethra, dysuria, frequency, she was given Rx of PO nitrofurantoin and returned to her facility (per [facility] micro lab, Cx resulted 3+ organisms and was considered contaminated); she was sent to [facility] ED via EMS for \"red eyes and a possible UTI,\" on arrival she was noted to have agitated delirium with significant leukocytosis; respiratory PCR was positive for Covid 19, UA was grossly abnormal; she was admitted for sepsis    *I have completed the med rec on this patient that was not done at the time of admission    Assessment/Plan    Sepsis 2/2 UTI  Abnormal abd CT  -s/p po nitrofurantoin starting 9/14; Cx from [facility] w/ mixed kalpesh  -no prior Hx of MDRO on UrCxm stop zosyn and start ceftriaxone  -cont vanc for empiric enterococcal coverage, DC pending UrCx data  -seen by uro, fistulization felt less likely, potential " cystoscopy once improved    Baseline cognitive deficit w/ acute metabolic encephalopathy (agitated delirium)  Depression/Anxiety w/ ?Hx of panic attacks  -facility reports pt as A&O x1 at baseline  -pt chronically on benzos, hold geodon and start prn versed IV (holding po xanax until more alert and reliably taking po meds)  -restarted home buspar, sertraline, remeron    Covid 19 infection  COPD w/ chronic 2lpm NC use  -unclear syx onset, first positive 9/27/24  -CXR clear, no significant acute hypoxia, monitor    HTN  HLD  pAfib  -restarted home ASA  -holding coreg 2/2 borderline BP    GERD - ppi  Hypothyroid - levothyroxine  Neuropathy - gabapentin      Ervin Edge, DO  09/28/24

## 2024-09-28 NOTE — ED NOTES
Beckie Romeo    Nursing Report ED to Floor:  Mental status: AOx2  Ambulatory status: Assist x2 PT did not ambulate in ed  Oxygen Therapy:  2L NC  Cardiac Rhythm: NSR  Admitted from: [facility]  Safety Concerns:  Fall risk  Social Issues: Pt has been combative and verbal with staff.  ED Room #:  29    ED Nurse Phone Extension - [phone] or may call [phone].      HPI:   Chief Complaint   Patient presents with    Eye Pain       Past Medical History:  Past Medical History:   Diagnosis Date    Age-related physical debility     Allergic rhinitis     Anemia     Atherosclerotic heart disease of native coronary artery without angina pectoris     Cognitive communication deficit     Contact with and (suspected) exposure to covid-19     COPD (chronic obstructive pulmonary disease)     Cough     Depression     Diarrhea, unspecified     Disease of thyroid gland     Flatulence     GERD (gastroesophageal reflux disease)     Hyperlipidemia     Hypertension     Insomnia     Major depressive disorder with single episode     Nausea with vomiting     Other specified anxiety disorders     Paroxysmal A-fib     Polyneuropathy     Sciatica of left side     Sciatica of right side     Unspecified protein-calorie malnutrition         Past Surgical History:  History reviewed. No pertinent surgical history.     Admitting Doctor:   Stef Ferguson DO    Consulting Provider(s):  Consults       Date and Time Order Name Status Description    9/28/2024  1:52 AM Inpatient Urology Consult               Admitting Diagnosis:   The primary encounter diagnosis was Sepsis without acute organ dysfunction, due to unspecified organism. Diagnoses of Acute UTI (urinary tract infection) and Chronic obstructive pulmonary disease, unspecified COPD type were also pertinent to this visit.    Most Recent Vitals:   Vitals:    09/27/24 2300 09/27/24 2330 09/28/24 0000 09/28/24 0030   BP: 119/53 116/63 119/94 129/63   BP Location:       Patient Position:       Pulse: 90 89 95     Resp:       Temp:       TempSrc:       SpO2: (!) 88% 100%     Weight:       Height:           Active LDAs/IV Access:   Lines, Drains & Airways       Active LDAs       None                    Labs (abnormal labs have a star):   Labs Reviewed   RESPIRATORY PANEL PCR W/ COVID-19 (SARS-COV-2), NP SWAB IN UTM/VTP, 2 HR TAT - Abnormal; Notable for the following components:       Result Value    COVID19 Detected (*)     All other components within normal limits    Narrative:     In the setting of a positive respiratory panel with a viral infection PLUS a negative procalcitonin without other underlying concern for bacterial infection, consider observing off antibiotics or discontinuation of antibiotics and continue supportive care. If the respiratory panel is positive for atypical bacterial infection (Bordetella pertussis, Chlamydophila pneumoniae, or Mycoplasma pneumoniae), consider antibiotic de-escalation to target atypical bacterial infection.   COMPREHENSIVE METABOLIC PANEL - Abnormal; Notable for the following components:    Glucose 121 (*)     BUN 41 (*)     Creatinine 1.11 (*)     CO2 33.0 (*)     ALT (SGPT) 49 (*)     AST (SGOT) 93 (*)     Alkaline Phosphatase 141 (*)     BUN/Creatinine Ratio 36.9 (*)     eGFR 51.3 (*)     All other components within normal limits    Narrative:     GFR Normal >60  Chronic Kidney Disease <60  Kidney Failure <15    The GFR formula is only valid for adults with stable renal function between ages 18 and 70.   SINGLE HS TROPONIN T - Abnormal; Notable for the following components:    HS Troponin T 15 (*)     All other components within normal limits    Narrative:     High Sensitive Troponin T Reference Range:  <14.0 ng/L- Negative Female for AMI  <22.0 ng/L- Negative Male for AMI  >=14 - Abnormal Female indicating possible myocardial injury.  >=22 - Abnormal Male indicating possible myocardial injury.   Clinicians would have to utilize clinical acumen, EKG, Troponin, and serial changes  to determine if it is an Acute Myocardial Infarction or myocardial injury due to an underlying chronic condition.        MAGNESIUM - Abnormal; Notable for the following components:    Magnesium 2.5 (*)     All other components within normal limits   CBC WITH AUTO DIFFERENTIAL - Abnormal; Notable for the following components:    WBC 27.42 (*)     RBC 3.61 (*)     Hemoglobin 9.7 (*)     Hematocrit 33.4 (*)     MCHC 29.0 (*)     Platelets 605 (*)     Neutrophil % 83.5 (*)     Lymphocyte % 7.1 (*)     Eosinophil % 0.0 (*)     Immature Grans % 1.2 (*)     Neutrophils, Absolute 22.86 (*)     Monocytes, Absolute 2.20 (*)     Immature Grans, Absolute 0.34 (*)     All other components within normal limits   URINALYSIS W/ MICROSCOPIC IF INDICATED (NO CULTURE) - Abnormal; Notable for the following components:    Appearance, UA Turbid (*)     Ketones, UA Trace (*)     Blood, UA Large (3+) (*)     Protein, UA >=300 mg/dL (3+) (*)     Leuk Esterase, UA Large (3+) (*)     All other components within normal limits   C-REACTIVE PROTEIN - Abnormal; Notable for the following components:    C-Reactive Protein 8.74 (*)     All other components within normal limits   URINALYSIS, MICROSCOPIC ONLY - Abnormal; Notable for the following components:    RBC, UA Unable to determine due to loaded field (*)     WBC, UA Too Numerous to Count (*)     Squamous Epithelial Cells, UA Unable to determine due to loaded field (*)     All other components within normal limits   LIPASE - Normal   BNP (IN-HOUSE) - Normal    Narrative:     This assay is used as an aid in the diagnosis of individuals suspected of having heart failure. It can be used as an aid in the diagnosis of acute decompensated heart failure (ADHF) in patients presenting with signs and symptoms of ADHF to the emergency department (ED). In addition, NT-proBNP of <300 pg/mL indicates ADHF is not likely.    Age Range Result Interpretation  NT-proBNP Concentration (pg/mL:      <50              "Positive            >450                   Gray                 300-450                    Negative             <300    50-75           Positive            >900                  Gray                300-900                  Negative            <300      >75             Positive            >1800                  Gray                300-1800                  Negative            <300   LACTIC ACID, PLASMA - Normal   PROCALCITONIN - Normal    Narrative:     As a Marker for Sepsis (Non-Neonates):    1. <0.5 ng/mL represents a low risk of severe sepsis and/or septic shock.  2. >2 ng/mL represents a high risk of severe sepsis and/or septic shock.    As a Marker for Lower Respiratory Tract Infections that require antibiotic therapy:    PCT on Admission    Antibiotic Therapy       6-12 Hrs later    >0.5                Strongly Recommended  >0.25 - <0.5        Recommended   0.1 - 0.25          Discouraged              Remeasure/reassess PCT  <0.1                Strongly Discouraged     Remeasure/reassess PCT    As 28 day mortality risk marker: \"Change in Procalcitonin Result\" (>80% or <=80%) if Day 0 (or Day 1) and Day 4 values are available. Refer to http://www.[email or web address]-pct-[email or web address]    Change in PCT <=80%  A decrease of PCT levels below or equal to 80% defines a positive change in PCT test result representing a higher risk for 28-day all-cause mortality of patients diagnosed with severe sepsis for septic shock.    Change in PCT >80%  A decrease of PCT levels of more than 80% defines a negative change in PCT result representing a lower risk for 28-day all-cause mortality of patients diagnosed with severe sepsis or septic shock.      PHOSPHORUS - Normal   TSH - Normal   POCT GLUCOSE FINGERSTICK - Normal   COVID PRE-OP / PRE-PROCEDURE SCREENING ORDER (NO ISOLATION)    Narrative:     The following orders were created for panel order COVID PRE-OP / PRE-PROCEDURE SCREENING ORDER (NO ISOLATION) - Swab, Nasopharynx.  Procedure    "                            Abnormality         Status                     ---------                               -----------         ------                     Respiratory Panel PCR w/...[[id number]]  Abnormal            Final result                 Please view results for these tests on the individual orders.   BLOOD CULTURE   BLOOD CULTURE   URINE CULTURE   RAINBOW DRAW    Narrative:     The following orders were created for panel order [place] Draw.  Procedure                               Abnormality         Status                     ---------                               -----------         ------                     Green Top (Gel)[[id number]]                                  Final result               Lavender Top[[id number]]                                     Final result               Gold Top - SST[[id number]]                                   Final result               French Top[[id number]]                                         Final result               Light Blue Top[[id number]]                                   Final result                 Please view results for these tests on the individual orders.   BASIC METABOLIC PANEL   MAGNESIUM   PHOSPHORUS   CBC WITH AUTO DIFFERENTIAL   POCT GLUCOSE FINGERSTICK   CBC AND DIFFERENTIAL    Narrative:     The following orders were created for panel order CBC & Differential.  Procedure                               Abnormality         Status                     ---------                               -----------         ------                     CBC Auto Differential[[id number]]        Abnormal            Final result                 Please view results for these tests on the individual orders.   GREEN TOP   LAVENDER TOP   GOLD TOP - SST   GRAY TOP   LIGHT BLUE TOP   CBC AND DIFFERENTIAL    Narrative:     The following orders were created for panel order CBC & Differential.  Procedure                               Abnormality         Status                      ---------                               -----------         ------                     CBC Auto Differential[[id number]]                                                         Please view results for these tests on the individual orders.       Meds Given in ED:   Medications   sodium chloride 0.9 % flush 10 mL (has no administration in time range)   ipratropium-albuterol (DUO-NEB) nebulizer solution 3 mL (3 mL Nebulization Not Given 9/28/24 0154)   sodium chloride 0.9 % flush 10 mL (has no administration in time range)   sodium chloride 0.9 % flush 10 mL (has no administration in time range)   sodium chloride 0.9 % infusion 40 mL (has no administration in time range)   Potassium Replacement - Follow Nurse / BPA Driven Protocol (has no administration in time range)   Magnesium Standard Dose Replacement - Follow Nurse / BPA Driven Protocol (has no administration in time range)   Phosphorus Replacement - Follow Nurse / BPA Driven Protocol (has no administration in time range)   Calcium Replacement - Follow Nurse / BPA Driven Protocol (has no administration in time range)   acetaminophen (TYLENOL) tablet 650 mg (has no administration in time range)     Or   acetaminophen (TYLENOL) 160 MG/5ML oral solution 650 mg (has no administration in time range)     Or   acetaminophen (TYLENOL) suppository 650 mg (has no administration in time range)   sennosides-docusate (PERICOLACE) 8.6-50 MG per tablet 2 tablet (has no administration in time range)     And   polyethylene glycol (MIRALAX) packet 17 g (has no administration in time range)     And   bisacodyl (DULCOLAX) EC tablet 5 mg (has no administration in time range)     And   bisacodyl (DULCOLAX) suppository 10 mg (has no administration in time range)   Pharmacy to dose vancomycin (has no administration in time range)   dexAMETHasone (DECADRON) injection 4 mg (has no administration in time range)   ziprasidone (GEODON) injection 10 mg (has no administration in time range)    piperacillin-tazobactam (ZOSYN) 3.375 g IVPB in 100 mL NS MBP (CD) (has no administration in time range)   vancomycin (dosing per levels) (has no administration in time range)   sodium chloride 0.9 % bolus 1,632 mL (0 mL Intravenous Stopped 9/28/24 0049)   Vancomycin HCl 1,250 mg in sodium chloride 0.9 % 250 mL VTB (0 mg Intravenous Stopped 9/28/24 0049)   piperacillin-tazobactam (ZOSYN) 3.375 g IVPB in 100 mL NS MBP (CD) (0 g Intravenous Stopped 9/27/24 2245)   iopamidol (ISOVUE-300) 61 % injection 80 mL (80 mL Intravenous Given 9/27/24 2222)     Pharmacy Consult - Pharmacy to dose,          Last NIH score:                                                          Dysphagia screening results:  Patient Factors Component (Dysphagia:Stroke or Rule-out)  Best Eye Response: 4-->(E4) spontaneous (09/28/24 0309)  Best Motor Response: 6-->(M6) obeys commands (09/28/24 0309)  Best Verbal Response: 4-->(V4) confused (09/28/24 0309)  Haris Coma Scale Score: 14 (09/28/24 0309)     Haris Coma Scale:  No data recorded     CIWA:        Restraint Type:            Isolation Status:  No active isolations

## 2024-09-29 LAB
ALBUMIN SERPL-MCNC: 3.1 G/DL (ref 3.5–5.2)
ALBUMIN/GLOB SERPL: 0.9 G/DL
ALP SERPL-CCNC: 130 U/L (ref 39–117)
ALT SERPL W P-5'-P-CCNC: 47 U/L (ref 1–33)
ANION GAP SERPL CALCULATED.3IONS-SCNC: 10 MMOL/L (ref 5–15)
AST SERPL-CCNC: 56 U/L (ref 1–32)
BASOPHILS # BLD AUTO: 0.03 10*3/MM3 (ref 0–0.2)
BASOPHILS NFR BLD AUTO: 0.2 % (ref 0–1.5)
BILIRUB SERPL-MCNC: 0.2 MG/DL (ref 0–1.2)
BUN SERPL-MCNC: 26 MG/DL (ref 8–23)
BUN/CREAT SERPL: 40 (ref 7–25)
CALCIUM SPEC-SCNC: 8.9 MG/DL (ref 8.6–10.5)
CHLORIDE SERPL-SCNC: 108 MMOL/L (ref 98–107)
CO2 SERPL-SCNC: 29 MMOL/L (ref 22–29)
CREAT SERPL-MCNC: 0.65 MG/DL (ref 0.57–1)
CRP SERPL-MCNC: 7.54 MG/DL (ref 0–0.5)
DEPRECATED RDW RBC AUTO: 52.1 FL (ref 37–54)
EGFRCR SERPLBLD CKD-EPI 2021: 90.8 ML/MIN/1.73
EOSINOPHIL # BLD AUTO: 0.14 10*3/MM3 (ref 0–0.4)
EOSINOPHIL NFR BLD AUTO: 0.7 % (ref 0.3–6.2)
ERYTHROCYTE [DISTWIDTH] IN BLOOD BY AUTOMATED COUNT: 15.1 % (ref 12.3–15.4)
GLOBULIN UR ELPH-MCNC: 3.4 GM/DL
GLUCOSE BLDC GLUCOMTR-MCNC: 114 MG/DL (ref 70–130)
GLUCOSE SERPL-MCNC: 67 MG/DL (ref 65–99)
HCT VFR BLD AUTO: 31.9 % (ref 34–46.6)
HGB BLD-MCNC: 9.2 G/DL (ref 12–15.9)
IMM GRANULOCYTES # BLD AUTO: 0.19 10*3/MM3 (ref 0–0.05)
IMM GRANULOCYTES NFR BLD AUTO: 1 % (ref 0–0.5)
LYMPHOCYTES # BLD AUTO: 1.58 10*3/MM3 (ref 0.7–3.1)
LYMPHOCYTES NFR BLD AUTO: 8.4 % (ref 19.6–45.3)
MCH RBC QN AUTO: 27 PG (ref 26.6–33)
MCHC RBC AUTO-ENTMCNC: 28.8 G/DL (ref 31.5–35.7)
MCV RBC AUTO: 93.5 FL (ref 79–97)
MONOCYTES # BLD AUTO: 1.58 10*3/MM3 (ref 0.1–0.9)
MONOCYTES NFR BLD AUTO: 8.4 % (ref 5–12)
NEUTROPHILS NFR BLD AUTO: 15.28 10*3/MM3 (ref 1.7–7)
NEUTROPHILS NFR BLD AUTO: 81.3 % (ref 42.7–76)
NRBC BLD AUTO-RTO: 0 /100 WBC (ref 0–0.2)
PLATELET # BLD AUTO: 527 10*3/MM3 (ref 140–450)
PMV BLD AUTO: 8.9 FL (ref 6–12)
POTASSIUM SERPL-SCNC: 3.5 MMOL/L (ref 3.5–5.2)
POTASSIUM SERPL-SCNC: 3.6 MMOL/L (ref 3.5–5.2)
PROT SERPL-MCNC: 6.5 G/DL (ref 6–8.5)
RBC # BLD AUTO: 3.41 10*6/MM3 (ref 3.77–5.28)
SODIUM SERPL-SCNC: 147 MMOL/L (ref 136–145)
VANCOMYCIN SERPL-MCNC: 6.6 MCG/ML (ref 5–40)
WBC NRBC COR # BLD AUTO: 18.8 10*3/MM3 (ref 3.4–10.8)

## 2024-09-29 PROCEDURE — 80202 ASSAY OF VANCOMYCIN: CPT

## 2024-09-29 PROCEDURE — 84132 ASSAY OF SERUM POTASSIUM: CPT | Performed by: STUDENT IN AN ORGANIZED HEALTH CARE EDUCATION/TRAINING PROGRAM

## 2024-09-29 PROCEDURE — 85025 COMPLETE CBC W/AUTO DIFF WBC: CPT | Performed by: INTERNAL MEDICINE

## 2024-09-29 PROCEDURE — 86140 C-REACTIVE PROTEIN: CPT | Performed by: INTERNAL MEDICINE

## 2024-09-29 PROCEDURE — 25010000002 CEFTRIAXONE PER 250 MG: Performed by: INTERNAL MEDICINE

## 2024-09-29 PROCEDURE — 82948 REAGENT STRIP/BLOOD GLUCOSE: CPT

## 2024-09-29 PROCEDURE — 25810000003 SODIUM CHLORIDE 0.9 % SOLUTION 250 ML FLEX CONT

## 2024-09-29 PROCEDURE — 99232 SBSQ HOSP IP/OBS MODERATE 35: CPT | Performed by: STUDENT IN AN ORGANIZED HEALTH CARE EDUCATION/TRAINING PROGRAM

## 2024-09-29 PROCEDURE — 80053 COMPREHEN METABOLIC PANEL: CPT | Performed by: INTERNAL MEDICINE

## 2024-09-29 PROCEDURE — 25010000002 VANCOMYCIN 750 MG RECONSTITUTED SOLUTION 1 EACH VIAL

## 2024-09-29 RX ORDER — POTASSIUM CHLORIDE 1500 MG/1
40 TABLET, EXTENDED RELEASE ORAL EVERY 4 HOURS
Status: DISCONTINUED | OUTPATIENT
Start: 2024-09-29 | End: 2024-09-29

## 2024-09-29 RX ORDER — POTASSIUM CHLORIDE 1500 MG/1
40 TABLET, EXTENDED RELEASE ORAL EVERY 4 HOURS
Status: COMPLETED | OUTPATIENT
Start: 2024-09-29 | End: 2024-09-30

## 2024-09-29 RX ORDER — POTASSIUM CHLORIDE 1.5 G/1.58G
40 POWDER, FOR SOLUTION ORAL EVERY 4 HOURS
Status: COMPLETED | OUTPATIENT
Start: 2024-09-29 | End: 2024-09-29

## 2024-09-29 RX ADMIN — VANCOMYCIN HYDROCHLORIDE 750 MG: 750 INJECTION, POWDER, LYOPHILIZED, FOR SOLUTION INTRAVENOUS at 21:10

## 2024-09-29 RX ADMIN — CETIRIZINE HYDROCHLORIDE 10 MG: 10 TABLET, FILM COATED ORAL at 09:55

## 2024-09-29 RX ADMIN — Medication 10 ML: at 21:10

## 2024-09-29 RX ADMIN — VANCOMYCIN HYDROCHLORIDE 750 MG: 750 INJECTION, POWDER, LYOPHILIZED, FOR SOLUTION INTRAVENOUS at 09:58

## 2024-09-29 RX ADMIN — LEVOTHYROXINE SODIUM 25 MCG: 25 TABLET ORAL at 06:04

## 2024-09-29 RX ADMIN — Medication 10 ML: at 09:58

## 2024-09-29 RX ADMIN — ASPIRIN 81 MG 81 MG: 81 TABLET ORAL at 09:55

## 2024-09-29 RX ADMIN — LANSOPRAZOLE 30 MG: 15 TABLET, ORALLY DISINTEGRATING ORAL at 06:04

## 2024-09-29 RX ADMIN — POTASSIUM CHLORIDE 40 MEQ: 1.5 POWDER, FOR SOLUTION ORAL at 14:35

## 2024-09-29 RX ADMIN — CARVEDILOL 12.5 MG: 12.5 TABLET, FILM COATED ORAL at 17:46

## 2024-09-29 RX ADMIN — POTASSIUM CHLORIDE 40 MEQ: 1500 TABLET, EXTENDED RELEASE ORAL at 09:55

## 2024-09-29 RX ADMIN — SODIUM CHLORIDE 2000 MG: 900 INJECTION INTRAVENOUS at 14:35

## 2024-09-29 RX ADMIN — MIRTAZAPINE 7.5 MG: 15 TABLET, FILM COATED ORAL at 21:10

## 2024-09-29 RX ADMIN — BUSPIRONE HYDROCHLORIDE 10 MG: 10 TABLET ORAL at 21:10

## 2024-09-29 RX ADMIN — GABAPENTIN 300 MG: 300 CAPSULE ORAL at 09:55

## 2024-09-29 RX ADMIN — POTASSIUM CHLORIDE 40 MEQ: 1500 TABLET, EXTENDED RELEASE ORAL at 21:09

## 2024-09-29 RX ADMIN — BUSPIRONE HYDROCHLORIDE 10 MG: 10 TABLET ORAL at 09:55

## 2024-09-29 RX ADMIN — GABAPENTIN 300 MG: 300 CAPSULE ORAL at 21:09

## 2024-09-29 RX ADMIN — GABAPENTIN 300 MG: 300 CAPSULE ORAL at 16:12

## 2024-09-29 RX ADMIN — SERTRALINE 100 MG: 100 TABLET, FILM COATED ORAL at 09:55

## 2024-09-29 RX ADMIN — POTASSIUM CHLORIDE 40 MEQ: 1.5 POWDER, FOR SOLUTION ORAL at 10:49

## 2024-09-29 RX ADMIN — ALPRAZOLAM 0.25 MG: 0.25 TABLET ORAL at 21:09

## 2024-09-29 NOTE — CASE MANAGEMENT/SOCIAL WORK
Discharge Planning Assessment  [facility]     Patient Name: Beckie Romeo  MRN: [id number]  Today's Date: 9/29/2024    Admit Date: 9/27/2024    Plan: Resident at [facility] medicaid bed   Discharge Needs Assessment       Row Name 09/29/24 1331       Living Environment    People in Home facility resident    Current Living Arrangements extended care facility    Potentially Unsafe Housing Conditions none    In the past 12 months has the electric, gas, oil, or water company threatened to shut off services in your home? No    Primary Care Provided by other (see comments)    Provides Primary Care For no one, unable/limited ability to care for self    Family Caregiver if Needed child(gordy), adult    Family Caregiver Names Dtr Jessica Kashmir [phone]    Quality of Family Relationships helpful;involved;supportive    Able to Return to Prior Arrangements yes    Living Arrangement Comments Plan to return to [facility] bed       Resource/Environmental Concerns    Resource/Environmental Concerns none    Transportation Concerns none       Transportation Needs    In the past 12 months, has lack of transportation kept you from medical appointments or from getting medications? no    In the past 12 months, has lack of transportation kept you from meetings, work, or from getting things needed for daily living? No       Food Insecurity    Within the past 12 months, you worried that your food would run out before you got the money to buy more. Never true    Within the past 12 months, the food you bought just didn't last and you didn't have money to get more. Never true       Transition Planning    Patient/Family Anticipates Transition to long-term care facility    Patient/Family Anticipated Services at Transition     Transportation Anticipated health plan transportation       Discharge Needs Assessment    Equipment Currently Used at Home wheelchair    Concerns to be Addressed discharge planning    Anticipated  Changes Related to Illness inability to care for self    Equipment Needed After Discharge wheelchair, manual    Discharge Facility/Level of Care Needs nursing facility, intermediate    Discharge Coordination/Progress Return to LT bed [facility]/                   Discharge Plan       Row Name 09/29/24 1228       Plan    Plan Resident at [facility] medicaid bed    Patient/Family in Agreement with Plan yes    Plan Comments Patient confused and I called the dtr Jessica to complete IDP. Patient is a resident at [facility] in a LT bed x 4 years. Dtr tells me when she comes to the hospital she goes back skilled. I placed an order for PT and OT bc of this. Patient is dependent with all adl's and has to be feed and uses a wheelchair.  Plan is to return to [facility] and CM will f/u with [facility] on Monday. CM will need to arrange transportation back to .    Final Discharge Disposition Code 04 - intermediate care facility                  Continued Care and Services - Admitted Since 9/27/2024       Destination Coordination complete.      Service Provider Request Status Selected Services Address Phone Fax Patient Preferred    [facility]  Selected Intermediate Care [address] [place] [id number] [phone] --                  Expected Discharge Date and Time       Expected Discharge Date Expected Discharge Time    Oct 1, 2024            Demographic Summary       Row Name 09/29/24 1330       General Information    Admission Type inpatient    Referral Source admission list    Reason for Consult discharge planning    Preferred Language English       Contact Information    Permission Granted to Share Info With     Contact Information Obtained for                    Functional Status       Row Name 09/29/24 8935       Functional Status    Usual Activity Tolerance poor    Current Activity Tolerance poor       Physical Activity    On average, how many days per week  do you engage in moderate to strenuous exercise (like a brisk walk)? 0 days    On average, how many minutes do you engage in exercise at this level? 0 min    Number of minutes of exercise per week 0       Functional Status, IADL    Medications completely dependent    Meal Preparation completely dependent    Housekeeping completely dependent    Laundry completely dependent    Shopping completely dependent    IADL Comments Has coverage for medications       Mental Status Summary    Recent Changes in Mental Status/Cognitive Functioning unable to assess;memory (remote)       Employment/    Employment Status retired                   Psychosocial    No documentation.                  Abuse/Neglect    No documentation.                  Legal    No documentation.                  Substance Abuse    No documentation.                  Patient Forms    No documentation.                     Nehal Haas RN

## 2024-09-29 NOTE — PROGRESS NOTES
"Pharmacy Consult-Vancomycin Dosing  Beckie Romeo is a  78 y.o. female receiving vancomycin therapy.     Indication:sepsis/intra-abdominal infection/UTI  Consulting Provider: hospitalist  ID Consult:     Goal Trough: 15-20 mcg/mL    Allergies  Allergies as of 09/27/2024 - Reviewed 09/27/2024   Allergen Reaction Noted    Codeine Unknown - Low Severity 02/02/2024    Zanaflex [tizanidine hcl] Other (See Comments) 02/02/2024       Labs    Results from last 7 days   Lab Units 09/29/24  0604 09/28/24  1214 09/27/24  1930   BUN mg/dL 26* 32* 41*   CREATININE mg/dL 0.65 0.81 1.11*       Results from last 7 days   Lab Units 09/29/24  0603 09/28/24  1429 09/27/24  1930   WBC 10*3/mm3 18.80* 17.18* 27.42*       Evaluation of Dosing     Last Dose Received in the ED/Outside Facility: 1250mg  Is Patient on Dialysis or Renal Replacement: no    Ht - 165.1 cm (65\")  Wt - 40.8 kg (90 lb)    Estimated Creatinine Clearance: 46.7 mL/min (by C-G formula based on SCr of 0.65 mg/dL).    Intake & Output (last 3 days)       None            Microbiology and Radiology  Microbiology Results (last 10 days)       Procedure Component Value - Date/Time    Blood Culture - Blood, Arm, Left [[id number]]  (Normal) Collected: 09/27/24 2107    Lab Status: Preliminary result Specimen: Blood from Arm, Left Updated: 09/28/24 2131     Blood Culture No growth at 24 hours    COVID PRE-OP / PRE-PROCEDURE SCREENING ORDER (NO ISOLATION) - Swab, Nasopharynx [[id number]]  (Abnormal) Collected: 09/27/24 1954    Lab Status: Final result Specimen: Swab from Nasopharynx Updated: 09/27/24 2059    Narrative:      The following orders were created for panel order COVID PRE-OP / PRE-PROCEDURE SCREENING ORDER (NO ISOLATION) - Swab, Nasopharynx.  Procedure                               Abnormality         Status                     ---------                               -----------         ------                     Respiratory Panel PCR w/...[[id number]]  Abnormal          "   Final result                 Please view results for these tests on the individual orders.    Blood Culture - Blood, Arm, Right [[id number]]  (Normal) Collected: 09/27/24 1954    Lab Status: Preliminary result Specimen: Blood from Arm, Right Updated: 09/28/24 2132     Blood Culture No growth at 24 hours    Respiratory Panel PCR w/COVID-19(SARS-CoV-2) [facility] In-House, NP Swab in UTM/VTM, 2 HR TAT - Swab, Nasopharynx [[id number]]  (Abnormal) Collected: 09/27/24 1954    Lab Status: Final result Specimen: Swab from Nasopharynx Updated: 09/27/24 2059     ADENOVIRUS, PCR Not Detected     Coronavirus 229E Not Detected     Coronavirus HKU1 Not Detected     Coronavirus NL63 Not Detected     Coronavirus OC43 Not Detected     COVID19 Detected     Human Metapneumovirus Not Detected     Human Rhinovirus/Enterovirus Not Detected     Influenza A PCR Not Detected     Influenza B PCR Not Detected     Parainfluenza Virus 1 Not Detected     Parainfluenza Virus 2 Not Detected     Parainfluenza Virus 3 Not Detected     Parainfluenza Virus 4 Not Detected     RSV, PCR Not Detected     Bordetella pertussis pcr Not Detected     Bordetella parapertussis PCR Not Detected     Chlamydophila pneumoniae PCR Not Detected     Mycoplasma pneumo by PCR Not Detected    Narrative:      In the setting of a positive respiratory panel with a viral infection PLUS a negative procalcitonin without other underlying concern for bacterial infection, consider observing off antibiotics or discontinuation of antibiotics and continue supportive care. If the respiratory panel is positive for atypical bacterial infection (Bordetella pertussis, Chlamydophila pneumoniae, or Mycoplasma pneumoniae), consider antibiotic de-escalation to target atypical bacterial infection.            Vancomycin Levels:    Results from last 7 days   Lab Units 09/29/24  0604 09/28/24  1214   VANCOMYCIN RM mcg/mL 6.60 <4.00*             Assessment/Plan:      Pharmacy dosing  Vancomycin for IAI. Goal -600 mg/L*hr  Vancomycin initiated with 1250mg (~20mg/kg) 9/27. Re-dose with Vancomycin 1000mg 9/28.  Vancomycin level 9/29 demonstrates greater than expected clearance.  Increase to vancomycin 750mg q12h.  Monitor renal function, culture results, clinical status and adjust as necessary.  Pharmacy will continue to follow.     Jaxon Mi, Elroy  9/29/2024  08:06 EDT

## 2024-09-29 NOTE — PROGRESS NOTES
[facility] Medicine Services  PROGRESS NOTE    Patient Name: Beckie Romeo  : 1945  MRN: [id number]    Date of Admission: 2024  Primary Care Physician: Emily De León MD    Subjective   Subjective     CC:  AMS    HPI:  Patient awake, alert and oriented to herself. Denies abdominal pain, or fever, admits to mild cough.       Objective   Objective     Vital Signs:   Temp:  [97.8 °F (36.6 °C)-98.5 °F (36.9 °C)] 97.8 °F (36.6 °C)  Heart Rate:  [] 92  Resp:  [18] 18  BP: (119-140)/(48-86) 125/65  Flow (L/min):  [2] 2     Physical Exam:  General appearance: alert, awake, no acute distress, cachectic, chronically ill-appearing  Cardiovascular: RRR, no murmurs or rubs, radial pulse full 2/4 BL   Respiratory: Trace rhonchi in bilateral anterior lung fields, oxygenating well on 2 L nasal cannula  Abdomen: soft, non-tender, no organomegaly, bowel sounds normoactive    Neuro/CNS: alert and oriented x1, normal speech    Results Reviewed:  LAB RESULTS:      Lab 24  0604 24  0624  1429 24  193   WBC  --  18.80* 17.18* 27.42*   HEMOGLOBIN  --  9.2* 9.0* 9.7*   HEMATOCRIT  --  31.9* 30.4* 33.4*   PLATELETS  --  527* 491* 605*   NEUTROS ABS  --  15.28* 14.57* 22.86*   IMMATURE GRANS (ABS)  --  0.19* 0.14* 0.34*   LYMPHS ABS  --  1.58 1.15 1.96   MONOS ABS  --  1.58* 1.28* 2.20*   EOS ABS  --  0.14 0.02 0.01   MCV  --  93.5 92.4 92.5   CRP 7.54*  --   --  8.74*   PROCALCITONIN  --   --   --  0.09   LACTATE  --   --   --  1.0   HSTROP T  --   --   --  15*         Lab 24  0604 24  1214 24  1930   SODIUM 147* 143 144   POTASSIUM 3.5 3.9 4.3   CHLORIDE 108* 104 101   CO2 29.0 28.0 33.0*   ANION GAP 10.0 11.0 10.0   BUN 26* 32* 41*   CREATININE 0.65 0.81 1.11*   EGFR 90.8 74.9 51.3*   GLUCOSE 67 95 121*   CALCIUM 8.9 8.9 9.3   MAGNESIUM  --   --  2.5*   PHOSPHORUS  --   --  3.3   TSH  --   --  0.908         Lab 24  0604  09/28/24  1214 09/27/24 1930   TOTAL PROTEIN 6.5 5.8* 7.4   ALBUMIN 3.1* 3.0* 3.5   GLOBULIN 3.4 2.8 3.9   ALT (SGPT) 47* 46* 49*   AST (SGOT) 56* 67* 93*   BILIRUBIN 0.2 0.2 0.2   ALK PHOS 130* 114 141*   LIPASE  --   --  28         Lab 09/27/24  1930   PROBNP 667.9   HSTROP T 15*                 Brief Urine Lab Results  (Last result in the past 365 days)        Color   Clarity   Blood   Leuk Est   Nitrite   Protein   CREAT   Urine HCG        09/27/24 2031 Yellow   Turbid   Large (3+)   Large (3+)   Negative   >=300 mg/dL (3+)                   Microbiology Results Abnormal       Procedure Component Value - Date/Time    Blood Culture - Blood, Arm, Right [[id number]]  (Normal) Collected: 09/27/24 1954    Lab Status: Preliminary result Specimen: Blood from Arm, Right Updated: 09/28/24 2132     Blood Culture No growth at 24 hours    Blood Culture - Blood, Arm, Left [[id number]]  (Normal) Collected: 09/27/24 2107    Lab Status: Preliminary result Specimen: Blood from Arm, Left Updated: 09/28/24 2131     Blood Culture No growth at 24 hours            CT Abdomen Pelvis With Contrast    Result Date: 9/27/2024  CT ABDOMEN PELVIS W CONTRAST Date of Exam: 9/27/2024 10:06 PM EDT Indication: Sepsis, altered mental status, tenderness, gross pyuria. Comparison: None available. Technique: Axial CT images were obtained of the abdomen and pelvis following the uneventful intravenous administration of Isovue-300, 80 mL IV. Reconstructed coronal and sagittal images were also obtained. Automated exposure control and iterative construction methods were used. Findings: Motion artifact limits diagnostic sensitivity. Lung Bases: Bilateral dependent atelectasis versus scarring is visualized. Peritoneum: No free intraperitoneal air or fluid. Abdominal wall: No evidence of abdominal hernia. There is suggestion of prior laparotomy. Liver: The liver is within normal limits in size and contour. Scattered punctate granulomas are visualized. The  portal vein is patent. Biliary/Gallbladder: The gallbladder is surgically absent. The biliary tree is nondilated. Pancreas: Pancreas is within normal limits. There is no evidence of pancreatic mass or peripancreatic inflammatory changes. Spleen: Spleen is normal in size and contour. Scattered granulomas are visualized. Gastrointestinal/Mesentery: No evidence of bowel obstruction or gross inflammatory changes. The appendix appears within normal limits.  Adrenals: Adrenal glands are unremarkable. Kidneys: The kidneys are in anatomic position. Multiple nonobstructing right-sided renal calculi are visualized measuring up to 1 cm in the right renal pelvis. No evidence of hydronephrosis or significant perinephric fat stranding. Cyst is visualized in the lower  pole of the left kidney measuring 2.8 cm. Bladder: There is marked urinary bladder wall thickening and mucosal hyperenhancement. Air-filled tract is visualized in the anterior wall of the urinary bladder measuring 1.8 cm in length and 0.7 cm in width. Finding may be from prior suprapubic urinary bladder catheter placement. Small volume air is visualized in the urinary bladder. Reproductive organs:  The patient is post hysterectomy. Lymph Nodes: No significant adenopathy is identified. Vasculature: Moderate aortoiliac atheromatous changes are visualized. The abdominal aorta is normal in caliber. Osseous Structures:  No acute fracture or aggressive lesions. Mild to moderate degenerative changes of the lumbar spine are visualized. There is levoscoliosis of the lumbar spine.     Impression: Impression: Motion artifact limits evaluation. Findings consistent with severe cystitis. There is a linear air-filled tract in the anterior wall of the urinary bladder measuring 1.8 cm in length and 0.7 cm in width. Finding may be from prior suprapubic urinary bladder catheter placement. Small volume  air is also visualized in the urinary bladder. If there is no history of recent  catheterization or instrumentation, infection with gas-forming organism or occult vesicoenteric fistula is a possibility. Nonobstructing right-sided nephrolithiasis. Ancillary findings as above. Electronically Signed: Quinton Vasquez MD  9/27/2024 10:34 PM EDT  Workstation ID: [id number]    CT Head Without Contrast    Result Date: 9/27/2024  CT HEAD WO CONTRAST Date of Exam: 9/27/2024 8:57 PM EDT Indication: altered mental status, suspect metabolic. Comparison: None available. Technique: Axial CT images were obtained of the head without contrast administration.  Automated exposure control and iterative construction methods were used. Findings: No evidence of acute intracranial hemorrhage or mass effect. No extra-axial collection. The gray white matter differentiation is preserved. Global parenchymal atrophy. Periventricular and subcortical white matter hypodensity, nonspecific, but likely sequela of chronic small vessel ischemic disease. The mastoid air cells and paranasal sinuses are well aerated subtle opacification of the left sphenoid sinus. Globes and extraocular muscles are unremarkable. No acute or suspicious osseous abnormality. Soft tissues within normal limits.     Impression: Impression: No evidence of acute intracranial abnormality. Typical chronic/age-related findings including brain atrophy and white matter change suggestive of chronic small vessel ischemia. Electronically Signed: Oziel Muniz MD  9/27/2024 9:20 PM EDT  Workstation ID: [id number]    XR Hand 3+ View Right    Result Date: 9/27/2024  XR HAND 3+ VW RIGHT Date of Exam: 9/27/2024 8:48 PM EDT Indication: pain with out known trauma,but significant dementia Comparison: None available. Findings: No evidence of acute fracture. No joint malalignment. Minimal scattered degenerative changes predominately involving the interphalangeal joints. Bones are demineralized. Soft tissues appear within normal limits.     Impression: Impression: No acute fracture  or joint malalignment. Minimal scattered degenerative changes involving the interphalangeal joints. Bones are demineralized. Electronically Signed: Oziel Muniz MD  9/27/2024 8:56 PM EDT  Workstation ID: [id number]    XR Chest 1 View    Result Date: 9/27/2024  XR CHEST 1 VW Date of Exam: 9/27/2024 8:08 PM EDT Indication: Weak/Dizzy/AMS triage protocol Comparison: Noncontrast chest CT performed on March 23, 2024 Findings: No acute infiltrate. Stable calcified granulomas in the left upper lobe. Left basilar scarring is visualized with associated pleural thickening. Lungs are hyper aerated. There is no evidence of pneumothorax.  Linear lucency adjacent to the left heart border was noted on prior chest imaging, likely artifactual secondary to adjacent scarring. The cardiac and mediastinal silhouette appear unremarkable. Atheromatous aortic calcifications are visualized. No acute osseous abnormality identified.     Impression: Impression: No acute chest process evident. Changes of COPD Stable left upper lobe granulomas and left basilar scarring. Electronically Signed: Quinton Vasquez MD  9/27/2024 8:14 PM EDT  Workstation ID: [id number]         Current medications:  Scheduled Meds:[Held by provider] ALPRAZolam, 0.25 mg, Oral, BID  aspirin, 81 mg, Oral, Daily  busPIRone, 10 mg, Oral, BID  [Held by provider] carvedilol, 12.5 mg, Oral, BID With Meals  cefTRIAXone, 2,000 mg, Intravenous, Q24H  cetirizine, 10 mg, Oral, Daily  gabapentin, 300 mg, Oral, TID  lansoprazole, 30 mg, Oral, Q AM  levothyroxine, 25 mcg, Oral, Q AM  mirtazapine, 7.5 mg, Oral, Nightly  potassium chloride, 40 mEq, Oral, Q4H  sertraline, 100 mg, Oral, Daily  sodium chloride, 10 mL, Intravenous, Q12H  vancomycin, 750 mg, Intravenous, Q12H      Continuous Infusions:Pharmacy to dose vancomycin,       PRN Meds:.  acetaminophen **OR** acetaminophen **OR** acetaminophen    baclofen    senna-docusate sodium **AND** polyethylene glycol **AND** bisacodyl **AND**  "bisacodyl    Calcium Replacement - Follow Nurse / BPA Driven Protocol    ipratropium-albuterol    Magnesium Standard Dose Replacement - Follow Nurse / BPA Driven Protocol    midazolam    Pharmacy to dose vancomycin    Phosphorus Replacement - Follow Nurse / BPA Driven Protocol    Potassium Replacement - Follow Nurse / BPA Driven Protocol    sodium chloride    sodium chloride    sodium chloride    Assessment & Plan   Assessment & Plan     Active Hospital Problems    Diagnosis  POA    **Sepsis [A41.9]  Yes    COVID-19 virus infection [U07.1]  Yes    Metabolic encephalopathy [G93.41]  Yes    Anxiety disorder due to known physiological condition [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     This patient's assessments and plans were partially entered by my partner and updated as appropriate by me on 9/29/2024    Brief Hospital Course to date:  Beckie Romeo is a 78 y.o. female resident of the [facility] w/ COPD (2lpm NC), HTN, HLD, hypothyroidism, GERD, cognitive deficit (A&O x1 at baseline per triage note), anxiety d/o w/ ?panic attacks, seen at [facility] ED 9/13/24 w/ low back pain radiating to her urethra, dysuria, frequency, she was given Rx of PO nitrofurantoin and returned to her facility (per [facility] micro lab, Cx resulted 3+ organisms and was considered contaminated); she was sent to [facility] ED via EMS for \"red eyes and a possible UTI,\" on arrival she was noted to have agitated delirium with significant leukocytosis; respiratory PCR was positive for Covid 19, UA was grossly abnormal; she was admitted for sepsis       Sepsis 2/2 UTI, Gram + Cocci   Abnormal Abdominal CT  -s/p po nitrofurantoin starting 9/14; Cx from [facility] w/ mixed kalpesh  -no prior Hx of MDRO. Continue ceftriaxone and Vanc empirically   -Urology consulted, fistulization felt less likely, potential cystoscopy once improved     Baseline cognitive deficit w/ acute " metabolic encephalopathy (agitated delirium)  Depression/Anxiety w/ ?Hx of panic attacks  -facility reports pt as A&O x1 at baseline  -pt chronically on benzos. Restart Xanax   -restarted home buspar, sertraline, remeron     Covid 19 infection  COPD w/ chronic hypoxia, 2L NC   -unclear symptom onset, first positive 9/27/24  -CXR clear, no significant acute hypoxia, monitor     HTN  HLD  pAfib  -Continue home ASA  -Restart coreg      GERD - ppi  Hypothyroid - levothyroxine  Neuropathy - gabapentin    Expected Discharge Location and Transportation: Nursing home  Expected Discharge   Expected Discharge Date: 9/30/2024; Expected Discharge Time:      VTE Prophylaxis:  Mechanical VTE prophylaxis orders are present.         AM-PAC 6 Clicks Score (PT): 15 (09/28/24 0811)    CODE STATUS:   Code Status and Medical Interventions: CPR (Attempt to Resuscitate); Full Support   Ordered at: 09/28/24 0038     Level Of Support Discussed With:    Patient     Code Status (Patient has no pulse and is not breathing):    CPR (Attempt to Resuscitate)     Medical Interventions (Patient has pulse or is breathing):    Full Support       Antolin Michael, DO  09/29/24

## 2024-09-29 NOTE — PROGRESS NOTES
"Urology    Patient Name: Beckie Romeo  Medical Record Number: [id number]  YOB: 1945     LOS: 1 day   Patient Care Team:  Emily De León MD as PCP - General (Internal Medicine)    Chief Complaint:    Chief Complaint   Patient presents with    Eye Pain       Subjective     Interval History:     No events reported.  She has no specific complaints.  She denies abdominal pain.  She believes she is voiding okay.    Review of Systems:    The following systems were reviewed and negative;  constitution, respiratory, cardiovascular, and gastrointestinal    Objective     Vital Signs  /65 (BP Location: Right arm, Patient Position: Lying)   Pulse 92   Temp 97.8 °F (36.6 °C) (Oral)   Resp 18   Ht 165.1 cm (65\")   Wt 40.8 kg (90 lb)   SpO2 (!) 87% Comment: pulled off O2  BMI 14.98 kg/m²   I/O last 3 completed shifts:  In: 60 [P.O.:60]  Out: -   No intake/output data recorded.      Physical Exam:  General Appearance: No acute distress, sitting up in bed, pleasant, appears comfortable  Lungs: Respirations regular, even and  unlabored  Abdomen: Nondistended     Results Review:     I reviewed the patient's new clinical results.  Lab Results (last 24 hours)       Procedure Component Value Units Date/Time    Urine Culture - Urine, Straight Cath [[id number]]  (Abnormal) Collected: 09/27/24 2031    Specimen: Urine from Straight Cath Updated: 09/29/24 1032     Urine Culture >100,000 CFU/mL Gram Positive Cocci    Narrative:      Colonization of the urinary tract without infection is common. Treatment is discouraged unless the patient is symptomatic, pregnant, or undergoing an invasive urologic procedure.    Comprehensive Metabolic Panel [[id number]]  (Abnormal) Collected: 09/29/24 0604    Specimen: Blood Updated: 09/29/24 0658     Glucose 67 mg/dL      BUN 26 mg/dL      Creatinine 0.65 mg/dL      Sodium 147 mmol/L      Potassium 3.5 mmol/L      Chloride 108 mmol/L      CO2 29.0 mmol/L      " Calcium 8.9 mg/dL      Total Protein 6.5 g/dL      Albumin 3.1 g/dL      ALT (SGPT) 47 U/L      AST (SGOT) 56 U/L      Alkaline Phosphatase 130 U/L      Total Bilirubin 0.2 mg/dL      Globulin 3.4 gm/dL      Comment: Calculated Result        A/G Ratio 0.9 g/dL      BUN/Creatinine Ratio 40.0     Anion Gap 10.0 mmol/L      eGFR 90.8 mL/min/1.73     Narrative:      GFR Normal >60  Chronic Kidney Disease <60  Kidney Failure <15    The GFR formula is only valid for adults with stable renal function between ages 18 and 70.    C-reactive Protein [[id number]]  (Abnormal) Collected: 09/29/24 0604    Specimen: Blood Updated: 09/29/24 0658     C-Reactive Protein 7.54 mg/dL     Vancomycin, Random [[id number]]  (Normal) Collected: 09/29/24 0604    Specimen: Blood Updated: 09/29/24 0658     Vancomycin Random 6.60 mcg/mL     Narrative:      Therapeutic Ranges for Vancomycin    Vancomycin Random   5.0-40.0 mcg/mL  Vancomycin Trough   5.0-20.0 mcg/mL  Vancomycin Peak     20.0-40.0 mcg/mL    CBC & Differential [[id number]]  (Abnormal) Collected: 09/29/24 0603    Specimen: Blood Updated: 09/29/24 0655    Narrative:      The following orders were created for panel order CBC & Differential.  Procedure                               Abnormality         Status                     ---------                               -----------         ------                     CBC Auto Differential[[id number]]        Abnormal            Final result                 Please view results for these tests on the individual orders.    CBC Auto Differential [[id number]]  (Abnormal) Collected: 09/29/24 0603    Specimen: Blood Updated: 09/29/24 0655     WBC 18.80 10*3/mm3      RBC 3.41 10*6/mm3      Hemoglobin 9.2 g/dL      Hematocrit 31.9 %      MCV 93.5 fL      MCH 27.0 pg      MCHC 28.8 g/dL      RDW 15.1 %      RDW-SD 52.1 fl      MPV 8.9 fL      Platelets 527 10*3/mm3      Neutrophil % 81.3 %      Lymphocyte % 8.4 %      Monocyte % 8.4 %      Eosinophil % 0.7  %      Basophil % 0.2 %      Immature Grans % 1.0 %      Neutrophils, Absolute 15.28 10*3/mm3      Lymphocytes, Absolute 1.58 10*3/mm3      Monocytes, Absolute 1.58 10*3/mm3      Eosinophils, Absolute 0.14 10*3/mm3      Basophils, Absolute 0.03 10*3/mm3      Immature Grans, Absolute 0.19 10*3/mm3      nRBC 0.0 /100 WBC     Blood Culture - Blood, Arm, Right [[id number]]  (Normal) Collected: 09/27/24 1954    Specimen: Blood from Arm, Right Updated: 09/28/24 2132     Blood Culture No growth at 24 hours    Blood Culture - Blood, Arm, Left [[id number]]  (Normal) Collected: 09/27/24 2107    Specimen: Blood from Arm, Left Updated: 09/28/24 2131     Blood Culture No growth at 24 hours    CBC & Differential [[id number]]  (Abnormal) Collected: 09/28/24 1429    Specimen: Blood Updated: 09/28/24 1453    Narrative:      The following orders were created for panel order CBC & Differential.  Procedure                               Abnormality         Status                     ---------                               -----------         ------                     CBC Auto Differential[[id number]]        Abnormal            Final result                 Please view results for these tests on the individual orders.    CBC Auto Differential [[id number]]  (Abnormal) Collected: 09/28/24 1429    Specimen: Blood Updated: 09/28/24 1453     WBC 17.18 10*3/mm3      RBC 3.29 10*6/mm3      Hemoglobin 9.0 g/dL      Hematocrit 30.4 %      MCV 92.4 fL      MCH 27.4 pg      MCHC 29.6 g/dL      RDW 15.3 %      RDW-SD 52.1 fl      MPV 9.0 fL      Platelets 491 10*3/mm3      Neutrophil % 84.8 %      Lymphocyte % 6.7 %      Monocyte % 7.5 %      Eosinophil % 0.1 %      Basophil % 0.1 %      Immature Grans % 0.8 %      Neutrophils, Absolute 14.57 10*3/mm3      Lymphocytes, Absolute 1.15 10*3/mm3      Monocytes, Absolute 1.28 10*3/mm3      Eosinophils, Absolute 0.02 10*3/mm3      Basophils, Absolute 0.02 10*3/mm3      Immature Grans, Absolute 0.14 10*3/mm3       nRBC 0.0 /100 WBC             Medication Review:    Current Facility-Administered Medications:     acetaminophen (TYLENOL) tablet 650 mg, 650 mg, Oral, Q4H PRN, 650 mg at 09/28/24 1420 **OR** acetaminophen (TYLENOL) 160 MG/5ML oral solution 650 mg, 650 mg, Oral, Q4H PRN **OR** acetaminophen (TYLENOL) suppository 650 mg, 650 mg, Rectal, Q4H PRN, Stef Ferguson DO    ALPRAZolam (XANAX) tablet 0.25 mg, 0.25 mg, Oral, BID, Antolin Michael DO    aspirin chewable tablet 81 mg, 81 mg, Oral, Daily, Ervin Edge DO, 81 mg at 09/29/24 0955    baclofen (LIORESAL) tablet 10 mg, 10 mg, Oral, TID PRN, Ervin Edge DO, 10 mg at 09/28/24 2150    sennosides-docusate (PERICOLACE) 8.6-50 MG per tablet 2 tablet, 2 tablet, Oral, BID PRN **AND** polyethylene glycol (MIRALAX) packet 17 g, 17 g, Oral, Daily PRN **AND** bisacodyl (DULCOLAX) EC tablet 5 mg, 5 mg, Oral, Daily PRN **AND** bisacodyl (DULCOLAX) suppository 10 mg, 10 mg, Rectal, Daily PRN, Stef Ferguson DO    busPIRone (BUSPAR) tablet 10 mg, 10 mg, Oral, BID, Ervin Edge DO, 10 mg at 09/29/24 0955    Calcium Replacement - Follow Nurse / BPA Driven Protocol, , Does not apply, PRN, Stef Ferguson DO    carvedilol (COREG) tablet 12.5 mg, 12.5 mg, Oral, BID With Meals, Antolin Michael DO    cefTRIAXone (ROCEPHIN) 2,000 mg in sodium chloride 0.9 % 100 mL MBP, 2,000 mg, Intravenous, Q24H, Ervin Edge DO, Last Rate: 200 mL/hr at 09/28/24 1257, 2,000 mg at 09/28/24 1257    cetirizine (zyrTEC) tablet 10 mg, 10 mg, Oral, Daily, Ervin Edge DO, 10 mg at 09/29/24 0955    gabapentin (NEURONTIN) capsule 300 mg, 300 mg, Oral, TID, Ervin Edge DO, 300 mg at 09/29/24 0955    ipratropium-albuterol (DUO-NEB) nebulizer solution 3 mL, 3 mL, Nebulization, Q6H PRN, Ervin Edge DO, 3 mL at 09/28/24 1335    lansoprazole (PREVACID SOLUTAB) disintegrating tablet Tablet Delayed Release Dispersible 30 mg, 30 mg, Oral, Q AM,  Ervin Edge DO, 30 mg at 09/29/24 0604    levothyroxine (SYNTHROID, LEVOTHROID) tablet 25 mcg, 25 mcg, Oral, Q AM, Ervin Edge DO, 25 mcg at 09/29/24 0604    Magnesium Standard Dose Replacement - Follow Nurse / BPA Driven Protocol, , Does not apply, PRN, Stef Ferguson DO    mirtazapine (REMERON) tablet 7.5 mg, 7.5 mg, Oral, Nightly, Ervin Edge DO, 7.5 mg at 09/28/24 2150    Pharmacy to dose vancomycin, , Does not apply, Continuous PRN, Ervin Edge DO    Phosphorus Replacement - Follow Nurse / BPA Driven Protocol, , Does not apply, PRN, Stef Ferguson DO    potassium chloride (KLOR-CON) packet 40 mEq, 40 mEq, Oral, Q4H, Antolin Michael DO, 40 mEq at 09/29/24 1049    Potassium Replacement - Follow Nurse / BPA Driven Protocol, , Does not apply, PRN, Stef Ferguson DO    sertraline (ZOLOFT) tablet 100 mg, 100 mg, Oral, Daily, Ervin Egde DO, 100 mg at 09/29/24 0955    sodium chloride 0.9 % flush 10 mL, 10 mL, Intravenous, PRN, Hiro Howell MD    sodium chloride 0.9 % flush 10 mL, 10 mL, Intravenous, Q12H, Stef Ferguson DO, 10 mL at 09/29/24 0958    sodium chloride 0.9 % flush 10 mL, 10 mL, Intravenous, PRN, Stef Ferguson DO    sodium chloride 0.9 % infusion 40 mL, 40 mL, Intravenous, PRN, Stef Ferguson DO    vancomycin 750 mg in sodium chloride 0.9 % 250 mL IVPB-VTB, 750 mg, Intravenous, Q12H, Jaxon Mi, PharmD, Last Rate: 333.3 mL/hr at 09/29/24 0958, 750 mg at 09/29/24 0958    Assessment and Plan    78-year-old female, SNF patient at the [facility], with a history of COPD, HTN, hypothyroidism, and UTIs.     She was admitted for AMS, UTI, sepsis, cystitis, and possible bladder fistula. A CT scan with contrast showed left renal cyst, possible right renal stone, severe bladder wall thickening, cystitis, and anterior bladder wall 1.8 cm x 7 mm fistulous tract with gas. On exam, she has a low midline scar presumably from hysterectomy.  A urine  culture shows gram positive cocci.    She feels well.  The WBC and creatinine improved.  She is afebrile and hemodynamically stable.      Plan: Continue broad-spectrum antibiotics pending cultures, currently Zosyn and vancomycin.  I will arrange cystoscopy for further evaluation when stable, most likely outpatient.      Sepsis    COPD (chronic obstructive pulmonary disease)    Anxiety disorder due to known physiological condition    COVID-19 virus infection    Metabolic encephalopathy          Plan for disposition:Where: SNF and When:  TBD    Teo Galindo MD  09/29/24  13:45 EDT

## 2024-09-30 LAB
ANION GAP SERPL CALCULATED.3IONS-SCNC: 10 MMOL/L (ref 5–15)
BACTERIA SPEC AEROBE CULT: ABNORMAL
BASOPHILS # BLD AUTO: 0.03 10*3/MM3 (ref 0–0.2)
BASOPHILS NFR BLD AUTO: 0.2 % (ref 0–1.5)
BUN SERPL-MCNC: 16 MG/DL (ref 8–23)
BUN/CREAT SERPL: 24.2 (ref 7–25)
CALCIUM SPEC-SCNC: 9 MG/DL (ref 8.6–10.5)
CHLORIDE SERPL-SCNC: 108 MMOL/L (ref 98–107)
CO2 SERPL-SCNC: 28 MMOL/L (ref 22–29)
CREAT SERPL-MCNC: 0.66 MG/DL (ref 0.57–1)
DEPRECATED RDW RBC AUTO: 52.6 FL (ref 37–54)
EGFRCR SERPLBLD CKD-EPI 2021: 90.5 ML/MIN/1.73
EOSINOPHIL # BLD AUTO: 0.32 10*3/MM3 (ref 0–0.4)
EOSINOPHIL NFR BLD AUTO: 2 % (ref 0.3–6.2)
ERYTHROCYTE [DISTWIDTH] IN BLOOD BY AUTOMATED COUNT: 15.1 % (ref 12.3–15.4)
GLUCOSE SERPL-MCNC: 90 MG/DL (ref 65–99)
HCT VFR BLD AUTO: 32.2 % (ref 34–46.6)
HGB BLD-MCNC: 9.3 G/DL (ref 12–15.9)
IMM GRANULOCYTES # BLD AUTO: 0.13 10*3/MM3 (ref 0–0.05)
IMM GRANULOCYTES NFR BLD AUTO: 0.8 % (ref 0–0.5)
LYMPHOCYTES # BLD AUTO: 1.43 10*3/MM3 (ref 0.7–3.1)
LYMPHOCYTES NFR BLD AUTO: 9 % (ref 19.6–45.3)
MCH RBC QN AUTO: 27.3 PG (ref 26.6–33)
MCHC RBC AUTO-ENTMCNC: 28.9 G/DL (ref 31.5–35.7)
MCV RBC AUTO: 94.4 FL (ref 79–97)
MONOCYTES # BLD AUTO: 1.71 10*3/MM3 (ref 0.1–0.9)
MONOCYTES NFR BLD AUTO: 10.8 % (ref 5–12)
NEUTROPHILS NFR BLD AUTO: 12.28 10*3/MM3 (ref 1.7–7)
NEUTROPHILS NFR BLD AUTO: 77.2 % (ref 42.7–76)
NRBC BLD AUTO-RTO: 0 /100 WBC (ref 0–0.2)
PLATELET # BLD AUTO: 497 10*3/MM3 (ref 140–450)
PMV BLD AUTO: 8.8 FL (ref 6–12)
POTASSIUM SERPL-SCNC: 4.4 MMOL/L (ref 3.5–5.2)
RBC # BLD AUTO: 3.41 10*6/MM3 (ref 3.77–5.28)
SODIUM SERPL-SCNC: 146 MMOL/L (ref 136–145)
VANCOMYCIN SERPL-MCNC: 10.4 MCG/ML (ref 5–40)
WBC NRBC COR # BLD AUTO: 15.9 10*3/MM3 (ref 3.4–10.8)

## 2024-09-30 PROCEDURE — 99232 SBSQ HOSP IP/OBS MODERATE 35: CPT | Performed by: HOSPITALIST

## 2024-09-30 PROCEDURE — 97530 THERAPEUTIC ACTIVITIES: CPT

## 2024-09-30 PROCEDURE — 80202 ASSAY OF VANCOMYCIN: CPT

## 2024-09-30 PROCEDURE — 85025 COMPLETE CBC W/AUTO DIFF WBC: CPT | Performed by: STUDENT IN AN ORGANIZED HEALTH CARE EDUCATION/TRAINING PROGRAM

## 2024-09-30 PROCEDURE — 25010000002 CEFTRIAXONE PER 250 MG: Performed by: INTERNAL MEDICINE

## 2024-09-30 PROCEDURE — 97535 SELF CARE MNGMENT TRAINING: CPT

## 2024-09-30 PROCEDURE — 25010000002 VANCOMYCIN 750 MG RECONSTITUTED SOLUTION 1 EACH VIAL

## 2024-09-30 PROCEDURE — 25810000003 SODIUM CHLORIDE 0.9 % SOLUTION 250 ML FLEX CONT

## 2024-09-30 PROCEDURE — 80048 BASIC METABOLIC PNL TOTAL CA: CPT | Performed by: STUDENT IN AN ORGANIZED HEALTH CARE EDUCATION/TRAINING PROGRAM

## 2024-09-30 PROCEDURE — 97166 OT EVAL MOD COMPLEX 45 MIN: CPT

## 2024-09-30 RX ADMIN — SERTRALINE 100 MG: 100 TABLET, FILM COATED ORAL at 10:01

## 2024-09-30 RX ADMIN — BUSPIRONE HYDROCHLORIDE 10 MG: 10 TABLET ORAL at 20:43

## 2024-09-30 RX ADMIN — LEVOTHYROXINE SODIUM 25 MCG: 25 TABLET ORAL at 05:49

## 2024-09-30 RX ADMIN — GABAPENTIN 300 MG: 300 CAPSULE ORAL at 18:04

## 2024-09-30 RX ADMIN — CARVEDILOL 12.5 MG: 12.5 TABLET, FILM COATED ORAL at 18:04

## 2024-09-30 RX ADMIN — BUSPIRONE HYDROCHLORIDE 10 MG: 10 TABLET ORAL at 10:00

## 2024-09-30 RX ADMIN — CARVEDILOL 12.5 MG: 12.5 TABLET, FILM COATED ORAL at 10:00

## 2024-09-30 RX ADMIN — LANSOPRAZOLE 30 MG: 15 TABLET, ORALLY DISINTEGRATING ORAL at 05:49

## 2024-09-30 RX ADMIN — GABAPENTIN 300 MG: 300 CAPSULE ORAL at 10:01

## 2024-09-30 RX ADMIN — Medication 10 ML: at 20:44

## 2024-09-30 RX ADMIN — CETIRIZINE HYDROCHLORIDE 10 MG: 10 TABLET, FILM COATED ORAL at 10:00

## 2024-09-30 RX ADMIN — ALPRAZOLAM 0.25 MG: 0.25 TABLET ORAL at 20:43

## 2024-09-30 RX ADMIN — VANCOMYCIN HYDROCHLORIDE 750 MG: 750 INJECTION, POWDER, LYOPHILIZED, FOR SOLUTION INTRAVENOUS at 10:00

## 2024-09-30 RX ADMIN — SODIUM CHLORIDE 2000 MG: 900 INJECTION INTRAVENOUS at 12:03

## 2024-09-30 RX ADMIN — ALPRAZOLAM 0.25 MG: 0.25 TABLET ORAL at 10:00

## 2024-09-30 RX ADMIN — GABAPENTIN 300 MG: 300 CAPSULE ORAL at 20:43

## 2024-09-30 RX ADMIN — VANCOMYCIN HYDROCHLORIDE 750 MG: 750 INJECTION, POWDER, LYOPHILIZED, FOR SOLUTION INTRAVENOUS at 21:37

## 2024-09-30 RX ADMIN — ACETAMINOPHEN 650 MG: 325 TABLET ORAL at 10:01

## 2024-09-30 RX ADMIN — POTASSIUM CHLORIDE 40 MEQ: 1500 TABLET, EXTENDED RELEASE ORAL at 01:59

## 2024-09-30 RX ADMIN — Medication 10 ML: at 10:00

## 2024-09-30 RX ADMIN — ASPIRIN 81 MG 81 MG: 81 TABLET ORAL at 10:00

## 2024-09-30 RX ADMIN — MIRTAZAPINE 7.5 MG: 15 TABLET, FILM COATED ORAL at 20:44

## 2024-09-30 NOTE — PLAN OF CARE
Problem: Skin Injury Risk Increased  Goal: Skin Health and Integrity  Outcome: Progressing  Intervention: Optimize Skin Protection  Recent Flowsheet Documentation  Taken 9/29/2024 2000 by Mary Lou Chaidez RN  Pressure Reduction Techniques:   frequent weight shift encouraged   pressure points protected  Head of Bed (HOB) Positioning: HOB elevated  Pressure Reduction Devices:   positioning supports utilized   pressure-redistributing mattress utilized  Skin Protection:   adhesive use limited   transparent dressing maintained     Problem: Fall Injury Risk  Goal: Absence of Fall and Fall-Related Injury  Outcome: Progressing  Intervention: Identify and Manage Contributors  Recent Flowsheet Documentation  Taken 9/29/2024 2000 by Mary Lou Chaidez RN  Medication Review/Management: medications reviewed  Self-Care Promotion:   BADL personal objects within reach   safe use of adaptive equipment encouraged  Intervention: Promote Injury-Free Environment  Recent Flowsheet Documentation  Taken 9/29/2024 2000 by Mary Lou Chaidez RN  Safety Promotion/Fall Prevention:   activity supervised   clutter free environment maintained   assistive device/personal items within reach   nonskid shoes/slippers when out of bed   safety round/check completed   room organization consistent     Problem: Adjustment to Illness (Sepsis/Septic Shock)  Goal: Optimal Coping  Outcome: Progressing  Intervention: Optimize Psychosocial Adjustment to Illness  Recent Flowsheet Documentation  Taken 9/29/2024 2000 by Mary Lou Chaidez, RN  Supportive Measures:   active listening utilized   positive reinforcement provided  Family/Support System Care: support provided     Problem: Bleeding (Sepsis/Septic Shock)  Goal: Absence of Bleeding  Outcome: Progressing     Problem: Glycemic Control Impaired (Sepsis/Septic Shock)  Goal: Blood Glucose Level Within Desired Range  Outcome: Progressing     Problem: Infection Progression (Sepsis/Septic Shock)  Goal:  Absence of Infection Signs and Symptoms  Outcome: Progressing  Intervention: Initiate Sepsis Management  Recent Flowsheet Documentation  Taken 9/29/2024 2000 by Mary Lou Chaidez RN  Infection Prevention:   environmental surveillance performed   single patient room provided   equipment surfaces disinfected   rest/sleep promoted  Intervention: Promote Recovery  Recent Flowsheet Documentation  Taken 9/29/2024 2000 by Mary Lou Chaidez RN  Activity Management: activity encouraged  Airway/Ventilation Support: pulmonary hygiene promoted  Sleep/Rest Enhancement:   relaxation techniques promoted   room darkened   therapeutic touch utilized     Problem: Nutrition Impaired (Sepsis/Septic Shock)  Goal: Optimal Nutrition Intake  Outcome: Progressing     Problem: Pain Acute  Goal: Acceptable Pain Control and Functional Ability  Outcome: Progressing  Intervention: Prevent or Manage Pain  Recent Flowsheet Documentation  Taken 9/29/2024 2000 by Mary Lou Chaidez RN  Sensory Stimulation Regulation:   care clustered   television on  Bowel Elimination Promotion: adequate fluid intake promoted  Sleep/Rest Enhancement:   relaxation techniques promoted   room darkened   therapeutic touch utilized  Medication Review/Management: medications reviewed  Intervention: Optimize Psychosocial Wellbeing  Recent Flowsheet Documentation  Taken 9/29/2024 2000 by Mary Lou Chaidez RN  Supportive Measures:   active listening utilized   positive reinforcement provided  Diversional Activities: television  Spiritual Activities Assistance: spiritual support provided     Problem: Infection  Goal: Absence of Infection Signs and Symptoms  Outcome: Progressing     Problem: Hypertension Comorbidity  Goal: Blood Pressure in Desired Range  Outcome: Progressing  Intervention: Maintain Blood Pressure Management  Recent Flowsheet Documentation  Taken 9/29/2024 2000 by Mary Lou Chaidez RN  Syncope Management: position changed slowly  Medication  Review/Management: medications reviewed     Problem: Pain Chronic (Persistent) (Comorbidity Management)  Goal: Acceptable Pain Control and Functional Ability  Outcome: Progressing  Intervention: Manage Persistent Pain  Recent Flowsheet Documentation  Taken 9/29/2024 2000 by Mary Lou Chaidez RN  Bowel Elimination Promotion: adequate fluid intake promoted  Sleep/Rest Enhancement:   relaxation techniques promoted   room darkened   therapeutic touch utilized  Medication Review/Management: medications reviewed  Intervention: Optimize Psychosocial Wellbeing  Recent Flowsheet Documentation  Taken 9/29/2024 2000 by Mary Lou Chaidez RN  Supportive Measures:   active listening utilized   positive reinforcement provided  Diversional Activities: television  Spiritual Activities Assistance: spiritual support provided  Family/Support System Care: support provided     Problem: Adult Inpatient Plan of Care  Goal: Plan of Care Review  Outcome: Progressing  Flowsheets (Taken 9/29/2024 2315)  Progress: no change  Plan of Care Reviewed With: patient   Goal Outcome Evaluation:  Plan of Care Reviewed With: patient        Progress: no change

## 2024-09-30 NOTE — NURSING NOTE
"Pt oriented to self, 2L NC, NSR on tele, no c/o pain this shift. Reports having visual and auditory hallucinations, describes seeing \"Teo\" and \"the children behind you\". Calm and cooperative this shift. Turned q2hrs, Allevyn present on coccyx, safety precautions in place.   "

## 2024-09-30 NOTE — PROGRESS NOTES
"          Clinical Nutrition Assessment     Patient Name: Beckie Romeo  YOB: 1945  MRN: [id number]  Date of Encounter: 09/30/24 13:12 EDT  Admission date: 9/27/2024  Reason for Visit: MST score 2+, BMI, Reduced oral intake, \"Unsure\" unintentional weight loss    Assessment   Nutrition Assessment   Admission Diagnosis:  Sepsis [A41.9]    Problem List:    Sepsis    COPD (chronic obstructive pulmonary disease)    Anxiety disorder due to known physiological condition    COVID-19 virus infection    Metabolic encephalopathy      PMH:   She  has a past medical history of Age-related physical debility, Allergic rhinitis, Anemia, Atherosclerotic heart disease of native coronary artery without angina pectoris, Cognitive communication deficit, Contact with and (suspected) exposure to covid-19, COPD (chronic obstructive pulmonary disease), Cough, Depression, Diarrhea, unspecified, Disease of thyroid gland, Flatulence, GERD (gastroesophageal reflux disease), Hyperlipidemia, Hypertension, Insomnia, Major depressive disorder with single episode, Nausea with vomiting, Other specified anxiety disorders, Paroxysmal A-fib, Polyneuropathy, Sciatica of left side, Sciatica of right side, and Unspecified protein-calorie malnutrition.    PSH:  She  has no past surgical history on file.    Applicable Nutrition History:   9/28 SLP: puree, thin liq    Anthropometrics     Height: Height: 165.1 cm (65\")  Last Filed Weight: Weight: 41.1 kg (90 lb 8 oz) (09/30/24 0411)  Method: Weight Method: Bed scale  BMI: BMI (Calculated): 15.1    UBW:     Weight      Weight (kg) Weight (lbs) Weight Method   2/2/2024 46.72 kg  103 lb  Stated    3/23/2024 61.236 kg  135 lb     9/27/2024 54.432 kg  120 lb  Estimated    9/28/2024 40.824 kg  90 lb  Bed scale    9/30/2024 41.051 kg  90 lb 8 oz       Weight change: weight loss of 10 lbs (10%) over 6 month(s)    Significant?  Yes; however, unsure of accuracy of CBW weight per daughter    Nutrition " Focused Physical Exam    Date:  09/30/24        Unable to perform due to COVID Isolation  - UTD malnutrition status at this time    Subjective   Reported/Observed/Food/Nutrition Related History:     09/30/24  Spoke with daughter via phone since patient is under COVID isolation and A&O x1 at baseline. Daughter reported patient is normally weight stable around 100# and is unsure if CBW of 90# is accurate, would like patient to be weighed again (will advise nursing). She stated patient has always had a smaller frame and normally her clothes are baggy on her at baseline so it has become increasingly hard to notice any weight loss. Reported patient will go most of the day without eating and then eat a good dinner meal. Enjoys vanilla milkshakes and country foods. Does not much like the food served here. Reported patient has never cared for protein supplements. Stated patient was on an appetite stimulant that was working well for her, unsure if patient still has this on board (noted remeron on med list). Reported patient has always said she is unable to eat eggs 2/2 to an allergy, but daughter is unsure if this is a true food allergy. SLP following.    Current Nutrition Prescription   PO: Diet: Cardiac; Healthy Heart (2-3 Na+); Texture: Pureed (NDD 1); Fluid Consistency: Thin (IDDSI 0)  Oral Nutrition Supplement: n/a  Intake: Avg 10% PO intake since admit    Assessment & Plan   Nutrition Diagnosis   Date:  09/30/24             Updated:    Problem Inadequate oral intake    Etiology Adv age   Signs/Symptoms Documented/reported poor PO intake   Status: New    Date: 09/30/24   Updated:  Problem Underweight   Etiology Adv age/inadequate energy intake   Signs/Symptoms BMI <23 for older adult (65 years or older)   Status: New    Date:  09/30/24  Updated:  Problem Biting/chewing difficulty   Etiology Acute illness   Signs/Symptoms Need for modified textures   Status: New    Goal:   Nutrition to support treatment and Increase  intake    Nutrition Intervention      Follow treatment progress, Care plan reviewed, Interview for preferences, Encourage intake, Supplement offered/refused    Patient does not meet malnutrition criteria at this time.  Will advise nursing to obtain another weight for verification per daughter request  Once weight is verified, will reassess malnutrition status  Encourage increasing PO intake with emphasis on protein first as able    Monitoring/Evaluation:   Per protocol, PO intake, Weight, Symptoms, POC/GOC, Swallow function    Lela Godoy MS,RD,LD  Time Spent: 35min

## 2024-09-30 NOTE — THERAPY EVALUATION
Patient Name: Beckie Romeo  : 1945    MRN: [id number]                              Today's Date: 2024       Admit Date: 2024    Visit Dx:     ICD-10-CM ICD-9-CM   1. Sepsis without acute organ dysfunction, due to unspecified organism  A41.9 038.9     995.91   2. Acute UTI (urinary tract infection)  N39.0 599.0   3. Chronic obstructive pulmonary disease, unspecified COPD type  J44.9 496     Patient Active Problem List   Diagnosis    COPD (chronic obstructive pulmonary disease)    Hypocalcemia    Anemia    Hypothyroidism    Essential hypertension    GERD without esophagitis    Panic attack    Hematuria    Anxiety disorder due to known physiological condition    Sepsis    COVID-19 virus infection    Metabolic encephalopathy     Past Medical History:   Diagnosis Date    Age-related physical debility     Allergic rhinitis     Anemia     Atherosclerotic heart disease of native coronary artery without angina pectoris     Cognitive communication deficit     Contact with and (suspected) exposure to covid-19     COPD (chronic obstructive pulmonary disease)     Cough     Depression     Diarrhea, unspecified     Disease of thyroid gland     Flatulence     GERD (gastroesophageal reflux disease)     Hyperlipidemia     Hypertension     Insomnia     Major depressive disorder with single episode     Nausea with vomiting     Other specified anxiety disorders     Paroxysmal A-fib     Polyneuropathy     Sciatica of left side     Sciatica of right side     Unspecified protein-calorie malnutrition      History reviewed. No pertinent surgical history.   General Information       Row Name 24 1351          OT Time and Intention    Document Type evaluation  -JY     Mode of Treatment occupational therapy;individual therapy  -JY       Row Name 24 1358          General Information    Patient Profile Reviewed yes  -JY     Prior Level of Function min assist:;grooming;dressing;transfer;independent:;w/c or  "scooter;feeding;dependent:;home management;cooking;cleaning;driving  pt difficult historian, PLOF obtained per chart & PT convo w/ dtr; pt I in s/f (goes to DR), some limited A in dressing, g/h, more A in bathing; able to t/f to w/c w/ little or no A, propels w/c w/ feet, uncertain if pt uses AD for t/f  -JY     Existing Precautions/Restrictions fall;oxygen therapy device and L/min;other (see comments)  per chart pt uses 2L O2 PRN, cognitive deficit at baseline, COVID 19 contact and airborne, hallucinations  -JY     Barriers to Rehab medically complex;previous functional deficit;cognitive status  -JY       Row Name 24 1351          Occupational Profile    Environmental Supports and Barriers (Occupational Profile) walk in shower w/ seat, pt reports standard toilet w/ grab bar support throughout; DME: uses w/c at facility of which pt propels w/ feet, uncertain of any other AD  -JY     Patient Goals (Occupational Profile) to return to PLOF  -JY       Row Name 24 1351          Living Environment    People in Home facility resident;other (see comments)  LTC resident at [facility] for ~ 4 yrs per chart  -JY       Row Name 24 1351          Home Main Entrance    Number of Stairs, Main Entrance none  -JY       Row Name 24 1351          Stairs Within Home, Primary    Number of Stairs, Within Home, Primary none  -JY       Row Name 24 1351          Cognition    Orientation Status (Cognition) oriented to;person;other (see comments)  able to state name, not full , stated  and year of birth for current date, stated,\" at a political office\" for place  -JY       Row Name 24 1351          Safety Issues, Functional Mobility    Safety Issues Affecting Function (Mobility) ability to follow commands;awareness of need for assistance;insight into deficits/self-awareness;judgment;problem-solving;safety precaution awareness;safety precautions follow-through/compliance;sequencing abilities  -JY     " Impairments Affecting Function (Mobility) balance;cognition;endurance/activity tolerance;postural/trunk control;strength  -JY     Cognitive Impairments, Mobility Safety/Performance attention;insight into deficits/self-awareness;judgment;problem-solving/reasoning;awareness, need for assistance;safety precaution awareness;safety precaution follow-through;sequencing abilities  -JY     Comment, Safety Issues/Impairments (Mobility) pt alert, able to follow simple commands, needs cues for initiation, follow through and optimal safety  -JY               User Key  (r) = Recorded By, (t) = Taken By, (c) = Cosigned By      Initials Name Provider Type    Naye Forte OT Occupational Therapist                     Mobility/ADL's       Row Name 09/30/24 1358          Bed Mobility    Bed Mobility other (see comments)  received UIC  -JY     Comment, (Bed Mobility) received UIC and pt preferred to remain OOB thus bed mobility not assessed this date  -JY       Row Name 09/30/24 1358          Transfers    Transfers sit-stand transfer;stand-sit transfer  -JY     Comment, (Transfers) skilled cues for optimal hand placement for controlled ascend, descend specifically to push up from seated surface, to reach back prior to sitting once aligned and in close proximity to seated surface  -JY       Row Name 09/30/24 1350          Sit-Stand Transfer    Sit-Stand [place] (Transfers) minimum assist (75% patient effort);2 person assist;verbal cues  -JY     Assistive Device (Sit-Stand Transfers) other (see comments)  B HHA  -JY     Comment, (Sit-Stand Transfer) AD unavailable, offered B HHA and pt received; postural cues for upright standing  -JY       Row Name 09/30/24 1356          Stand-Sit Transfer    Stand-Sit [place] (Transfers) minimum assist (75% patient effort);2 person assist;verbal cues  -JY     Assistive Device (Stand-Sit Transfers) other (see comments)  B HHA  -JY       Row Name 09/30/24 1358          Functional  Mobility    Functional Mobility- Ind. Level minimum assist (75% patient effort);2 person assist required;verbal cues required  -JY     Functional Mobility- Device other (see comments)  B HHA  -JY     Functional Mobility- Comment defer to PT for specifics however during in room ADL related mobility pt req'd ~ min A x 2 w/ BUE HHA; postural cues for upright posture for improved stability and pt overcompensated and leaned posteriorly before correction; became fatigued and was assisted back to recliner; RN had approved removal of supplemental O2 prior to fxl mobility, SPO2 decreased to 83% on RA and NC returned  -JY     Patient was able to Ambulate yes  -TheoremY       Row Name 09/30/24 1358          Activities of Daily Living    BADL Assessment/Intervention upper body dressing;lower body dressing;grooming  -Theorem       TORIA Name 09/30/24 [facility]          Upper Body Dressing Assessment/Training    [place] Level (Upper Body Dressing) doff;don;pajama/robe;moderate assist (50% patient effort);verbal cues  -JY     Position (Upper Body Dressing) unsupported sitting  -JY     Comment, (Upper Body Dressing) mod A in part for proximal and posterior mgmt and for situational A around IV at E  -       Row Name 09/30/24 1358          Lower Body Dressing Assessment/Training    [place] Level (Lower Body Dressing) doff;don;socks;moderate assist (50% patient effort);verbal cues  -JY     Position (Lower Body Dressing) unsupported sitting  -JY     Comment, (Lower Body Dressing) pt able to flex hips and knees to bring LEs more proximal for easier reach and able to push socks to mid foot and ceased completion d/t perceived inability to re don  -TheoremY       Row Name 09/30/24 1358          Grooming Assessment/Training    [place] Level (Grooming) wash face, hands;standby assist  -JY     Position (Grooming) supported sitting  -JY               User Key  (r) = Recorded By, (t) = Taken By, (c) = Cosigned By      Initials Name Provider Type     Naye Forte, OT Occupational Therapist                   Obj/Interventions       Row Name 09/30/24 1510          Sensory Assessment (Somatosensory)    Sensory Assessment (Somatosensory) bilateral UE;sensation intact  -JY     Bilateral UE Sensory Assessment general sensation;light touch awareness;intact  -JY     Sensory Assessment denies any numbness or tingling and able to recognize LT stimuli  -JY       Row Name 09/30/24 1510          Vision Assessment/Intervention    Vision Assessment Comment denies any acute changes to vision, u/a to complete formal vision assessment  -       Row Name 09/30/24 1510          Range of Motion Comprehensive    General Range of Motion bilateral upper extremity ROM WFL  -       Row Name 09/30/24 1510          Strength Comprehensive (MMT)    General Manual Muscle Testing (MMT) Assessment upper extremity strength deficits identified  -JY     Comment, General Manual Muscle Testing (MMT) Assessment BUE MMS 4/5  -JY       Row Name 09/30/24 1510          Motor Skills    Motor Skills functional endurance  -JY     Functional Endurance decreased activity tolerance toward more dynamic demands  -JY       Row Name 09/30/24 1510          Balance    Balance Assessment sitting static balance;sitting dynamic balance;standing static balance;standing dynamic balance  -JY     Static Sitting Balance contact guard  -JY     Dynamic Sitting Balance contact guard  -JY     Position, Sitting Balance supported;unsupported;sitting in chair  -JY     Static Standing Balance minimal assist;2-person assist;verbal cues  -JY     Dynamic Standing Balance minimal assist;2-person assist;verbal cues  -JY     Position/Device Used, Standing Balance other (see comments)  GABRIELAE HHA  -JY     Balance Interventions sitting;standing;static;dynamic;occupation based/functional task  -JY     Comment, Balance unsteady in standing, forward posture  -JY               User Key  (r) = Recorded By, (t) = Taken By, (c) = Cosigned By       Initials Name Provider Type    Naye Forte, OT Occupational Therapist                   Goals/Plan       Row Name 09/30/24 1518          Transfer Goal 1 (OT)    Activity/Assistive Device (Transfer Goal 1, OT) sit-to-stand/stand-to-sit;bed-to-chair/chair-to-bed;toilet;commode, bedside without drop arms;other (see comments)  AD recs per PT  -JY     [place] Level/Cues Needed (Transfer Goal 1, OT) minimum assist (75% or more patient effort);verbal cues required  -JY     Time Frame (Transfer Goal 1, OT) long term goal (LTG);10 days  -JY     Progress/Outcome (Transfer Goal 1, OT) new goal  -ELIAS       Row Name 09/30/24 1518          Dressing Goal 1 (OT)    Activity/Device (Dressing Goal 1, OT) upper body dressing  -JY     [place]/Cues Needed (Dressing Goal 1, OT) minimum assist (75% or more patient effort);verbal cues required  -JY     Time Frame (Dressing Goal 1, OT) long term goal (LTG);10 days  -JY     Progress/Outcome (Dressing Goal 1, OT) new goal  -JY       Row Name 09/30/24 1518          Grooming Goal 1 (OT)    Activity/Device (Grooming Goal 1, OT) hair care;oral care;wash face, hands  -JY     [place] (Grooming Goal 1, OT) contact guard required;standby assist  -JY     Time Frame (Grooming Goal 1, OT) short term goal (STG);5 days  -JY     Strategies/Barriers (Grooming Goal 1, OT) seated in graded position  -JY     Progress/Outcome (Grooming Goal 1, OT) new goal  -JY       Row Name 09/30/24 1518          Strength Goal 1 (OT)    Strength Goal 1 (OT) PT to complete seated HEP encompassing BUEs targeting strength, endurance with progressive sets/reps/resistance in order to improve integration in ADLs, related t/fs, mobility  -JY     Time Frame (Strength Goal 1, OT) long term goal (LTG);10 days  -JY       [facility] Name 09/30/24 1518          Therapy Assessment/Plan (OT)    Planned Therapy Interventions (OT) activity tolerance training;adaptive equipment training;BADL retraining;functional balance  retraining;neuromuscular control/coordination retraining;occupation/activity based interventions;patient/caregiver education/training;ROM/therapeutic exercise;strengthening exercise;transfer/mobility retraining  -JY               User Key  (r) = Recorded By, (t) = Taken By, (c) = Cosigned By      Initials Name Provider Type    Naye Forte OT Occupational Therapist                   Clinical Impression       Row Name 09/30/24 1514          Pain Assessment    Pretreatment Pain Rating 0/10 - no pain  -JY     Posttreatment Pain Rating 0/10 - no pain  -JY     Pre/Posttreatment Pain Comment denies any pain and tolerated all OT interventions  -JY       Row Name 09/30/24 1514          Plan of Care Review    Plan of Care Reviewed With patient  -JY     Progress no change  OT IE  -JY     Outcome Evaluation OT evaluation completed. PT presents with decreased I in ADLs, related t/fs, mobility compared to PLOF limited by decreased activity tolerance, impaired balance, muscle weakness at BUEs, fatigue with more dynamic demands and baseline cognitive deficits. Pt req'd variable A for ADLs observed with mod A for UBD to dep for toileting A, mod A for LBD. Pt req'd gross min A x 2 for STS t/fs and fxl in room ADL related mobility with B HHA. Pt is below occupational performance baseline and would benefit from SNF at d/c when medically ready.  -JY       Row Name 09/30/24 1514          Therapy Assessment/Plan (OT)    Patient/Family Therapy Goal Statement (OT) to return to PLOF  -JY     Rehab Potential (OT) good, to achieve stated therapy goals  -JY     Criteria for Skilled Therapeutic Interventions Met (OT) yes;skilled treatment is necessary  -JY     Therapy Frequency (OT) daily  -JY     Predicted Duration of Therapy Intervention (OT) 6 days  -JY       Row Name 09/30/24 1514          Therapy Plan Review/Discharge Plan (OT)    Anticipated Discharge Disposition (OT) skilled nursing facility  -JY       Row Name 09/30/24 1514           Vital Signs    Pre Systolic BP Rehab 138  -JY     Pre Treatment Diastolic BP 69  -JY     Pretreatment Heart Rate (beats/min) 88  -JY     Pre SpO2 (%) 96  -JY     O2 Delivery Pre Treatment supplemental O2  -JY     Intra SpO2 (%) 83  -JY     O2 Delivery Intra Treatment room air  -JY     Post SpO2 (%) 94  -JY     O2 Delivery Post Treatment supplemental O2  -JY     Pre Patient Position Sitting  -JY     Post Patient Position Sitting  -JY       Row Name 09/30/24 1514          Positioning and Restraints    Pre-Treatment Position sitting in chair/recliner  -JY     Post Treatment Position chair  -JY     In Chair notified nsg;reclined;call light within reach;encouraged to call for assist;exit alarm on;RUE elevated;LUE elevated;compression device;waffle cushion;on mechanical lift sling;legs elevated;heels elevated  -JY               User Key  (r) = Recorded By, (t) = Taken By, (c) = Cosigned By      Initials Name Provider Type    Naye Forte, OT Occupational Therapist                   Outcome Measures       Row Name 09/30/24 1520          How much help from another is currently needed...    Putting on and taking off regular lower body clothing? 2  -JY     Bathing (including washing, rinsing, and drying) 2  -JY     Toileting (which includes using toilet bed pan or urinal) 1  -JY     Putting on and taking off regular upper body clothing 2  -JY     Taking care of personal grooming (such as brushing teeth) 3  -JY     Eating meals 3  -JY     AM-PAC 6 Clicks Score (OT) 13  -JY       Row Name 09/30/24 0854          How much help from another person do you currently need...    Turning from your back to your side while in flat bed without using bedrails? 2  -AL     Moving from lying on back to sitting on the side of a flat bed without bedrails? 2  -AL     Moving to and from a bed to a chair (including a wheelchair)? 2  -AL     Standing up from a chair using your arms (e.g., wheelchair, bedside chair)? 2  -AL     Climbing 3-5  steps with a railing? 1  -AL     To walk in hospital room? 2  -AL     AM-PAC 6 Clicks Score (PT) 11  -AL     Highest Level of Mobility Goal 4 --> Transfer to chair/commode  -AL       Row Name 09/30/24 1520          Functional Assessment    Outcome Measure Options AM-PAC 6 Clicks Daily Activity (OT)  -ELIAS               User Key  (r) = Recorded By, (t) = Taken By, (c) = Cosigned By      Initials Name Provider Type    Naye Forte OT Occupational Therapist    Ambika Beltran, RN Registered Nurse                    Occupational Therapy Education       Title: PT OT SLP Therapies (In Progress)       Topic: Occupational Therapy (In Progress)       Point: ADL training (In Progress)       Description:   Instruct learner(s) on proper safety adaptation and remediation techniques during self care or transfers.   Instruct in proper use of assistive devices.                  Learning Progress Summary             Patient Acceptance, E,D, NR by ELIAS at 9/30/2024 1139    Acceptance, E,TB, VU by ARIEL at 9/29/2024 2311                         Point: Home exercise program (Done)       Description:   Instruct learner(s) on appropriate technique for monitoring, assisting and/or progressing therapeutic exercises/activities.                  Learning Progress Summary             Patient Acceptance, E,TB, VU by ARIEL at 9/29/2024 2311                         Point: Precautions (In Progress)       Description:   Instruct learner(s) on prescribed precautions during self-care and functional transfers.                  Learning Progress Summary             Patient Acceptance, E,D, NR by ELIAS at 9/30/2024 1139    Acceptance, E,TB, VU by ARIEL at 9/29/2024 2311                         Point: Body mechanics (In Progress)       Description:   Instruct learner(s) on proper positioning and spine alignment during self-care, functional mobility activities and/or exercises.                  Learning Progress Summary             Patient Acceptance, E,D, NR  by ELIAS at 9/30/2024 1139    Acceptance, E,TB, VU by AW at 9/29/2024 2311                                         User Key       Initials Effective Dates Name Provider Type Discipline    AW 09/19/24 -  Mary Lou Chaidez, RN Registered Nurse Nurse    ELIAS 06/16/21 -  Naye Lao OT Occupational Therapist OT                  OT Recommendation and Plan  Planned Therapy Interventions (OT): activity tolerance training, adaptive equipment training, BADL retraining, functional balance retraining, neuromuscular control/coordination retraining, occupation/activity based interventions, patient/caregiver education/training, ROM/therapeutic exercise, strengthening exercise, transfer/mobility retraining  Therapy Frequency (OT): daily  Plan of Care Review  Plan of Care Reviewed With: patient  Progress: no change (OT IE)  Outcome Evaluation: OT evaluation completed. PT presents with decreased I in ADLs, related t/fs, mobility compared to PLOF limited by decreased activity tolerance, impaired balance, muscle weakness at BUEs, fatigue with more dynamic demands and baseline cognitive deficits. Pt req'd variable A for ADLs observed with mod A for UBD to dep for toileting A, mod A for LBD. Pt req'd gross min A x 2 for STS t/fs and fxl in room ADL related mobility with B HHA. Pt is below occupational performance baseline and would benefit from SNF at d/c when medically ready.     Time Calculation:   Evaluation Complexity (OT)  Review Occupational Profile/Medical/Therapy History Complexity: expanded/moderate complexity  Assessment, Occupational Performance/Identification of Deficit Complexity: 3-5 performance deficits  Clinical Decision Making Complexity (OT): detailed assessment/moderate complexity  Overall Complexity of Evaluation (OT): moderate complexity     Time Calculation- OT       Row Name 09/30/24 1521             Time Calculation- OT    OT Start Time 1139  -JY      OT Received On 09/30/24  -JY      OT Goal Re-Cert Due Date  10/10/24  -JY         Timed Charges    10712 - OT Therapeutic Activity Minutes 9  -JY      38119 - OT Self Care/Mgmt Minutes 15  -JY         Untimed Charges    OT Eval/Re-eval Minutes 50  -JY         Total Minutes    Timed Charges Total Minutes 24  -JY      Untimed Charges Total Minutes 50  -JY       Total Minutes 74  -JY                User Key  (r) = Recorded By, (t) = Taken By, (c) = Cosigned By      Initials Name Provider Type    Naye Forte OT Occupational Therapist                  Therapy Charges for Today       Code Description Service Date Service Provider Modifiers Qty    07938864742 HC OT THERAPEUTIC ACT EA 15 MIN 9/30/2024 Naye Lao OT GO 1    28658597566 HC OT SELF CARE/MGMT/TRAIN EA 15 MIN 9/30/2024 Naye Lao OT GO 1    64387711270 HC OT EVAL MOD COMPLEXITY 4 9/30/2024 Naye Lao OT GO 1                 Naye Lao OT  9/30/2024

## 2024-09-30 NOTE — CASE MANAGEMENT/SOCIAL WORK
Continued Stay Note  [facility]     Patient Name: Beckie Romeo  MRN: [id number]  Today's Date: 9/30/2024    Admit Date: 9/27/2024    Plan: Resident at [facility]   Discharge Plan       Row Name 09/30/24 1257       Plan    Plan Resident at [facility]    Patient/Family in Agreement with Plan yes    Plan Comments Discussed Ms. Hendrix in MDR. Awaiting medical readiness to return to her LTC bed at the [facility]. CM will continue to follow up.    Final Discharge Disposition Code 04 - intermediate care facility                   Discharge Codes    No documentation.                 Expected Discharge Date and Time       Expected Discharge Date Expected Discharge Time    Sep 30, 2024               Shabnam Osborne RN

## 2024-09-30 NOTE — PLAN OF CARE
Goal Outcome Evaluation:  Plan of Care Reviewed With: patient        Progress: no change (OT IE)  Outcome Evaluation: OT evaluation completed. PT presents with decreased I in ADLs, related t/fs, mobility compared to PLOF limited by decreased activity tolerance, impaired balance, muscle weakness at BUEs, fatigue with more dynamic demands and baseline cognitive deficits. Pt req'd variable A for ADLs observed with mod A for UBD to dep for toileting A, mod A for LBD. Pt req'd gross min A x 2 for STS t/fs and fxl in room ADL related mobility with B HHA. Pt is below occupational performance baseline and would benefit from SNF at d/c when medically ready.      Anticipated Discharge Disposition (OT): skilled nursing facility

## 2024-09-30 NOTE — NURSING NOTE
[facility] consult for specialty mattress.     Visited patient on waffle mattress, RN notes no breakdown, allevyn's in place.    Waffle pressure redistributing mattress will be effective at this time. Please continue to to turn, apply allevyn dressings, and re-consult if areas of concern develop.

## 2024-09-30 NOTE — PROGRESS NOTES
[facility] Medicine Services  PROGRESS NOTE    Patient Name: Beckie Romeo  : 1945  MRN: [id number]    Date of Admission: 2024  Primary Care Physician: Emily De León MD    Subjective   Subjective     CC:  F/U AMS    HPI:  Seen this morning. Sitting up in chair. Dry cough but reports it is mild.       Objective   Objective     Vital Signs:   Temp:  [97 °F (36.1 °C)-98 °F (36.7 °C)] 97 °F (36.1 °C)  Heart Rate:  [] 99  Resp:  [16-20] 17  BP: (110-149)/(49-69) 147/66  Flow (L/min):  [1-2] 1     Physical Exam:  Gen-no acute distress, chronically ill appearing  HENT-NCAT, mucous membranes moist  CV-RRR, S1 S2 normal, no m/r/g  Resp-overall CTAB, no wheezes or rales  Abd-soft, NT, ND, +BS  Neuro-A&Ox1, no focal deficits  Psych-appropriate mood      Results Reviewed:  LAB RESULTS:      Lab 24  0634 24  0604 24  0603 24  1429 24  1930   WBC 15.90*  --  18.80* 17.18* 27.42*   HEMOGLOBIN 9.3*  --  9.2* 9.0* 9.7*   HEMATOCRIT 32.2*  --  31.9* 30.4* 33.4*   PLATELETS 497*  --  527* 491* 605*   NEUTROS ABS 12.28*  --  15.28* 14.57* 22.86*   IMMATURE GRANS (ABS) 0.13*  --  0.19* 0.14* 0.34*   LYMPHS ABS 1.43  --  1.58 1.15 1.96   MONOS ABS 1.71*  --  1.58* 1.28* 2.20*   EOS ABS 0.32  --  0.14 0.02 0.01   MCV 94.4  --  93.5 92.4 92.5   CRP  --  7.54*  --   --  8.74*   PROCALCITONIN  --   --   --   --  0.09   LACTATE  --   --   --   --  1.0   HSTROP T  --   --   --   --  15*         Lab 24  0634 24  1744 24  0604 24  1214 24  1930   SODIUM 146*  --  147* 143 144   POTASSIUM 4.4 3.6 3.5 3.9 4.3   CHLORIDE 108*  --  108* 104 101   CO2 28.0  --  29.0 28.0 33.0*   ANION GAP 10.0  --  10.0 11.0 10.0   BUN 16  --  26* 32* 41*   CREATININE 0.66  --  0.65 0.81 1.11*   EGFR 90.5  --  90.8 74.9 51.3*   GLUCOSE 90  --  67 95 121*   CALCIUM 9.0  --  8.9 8.9 9.3   MAGNESIUM  --   --   --   --  2.5*   PHOSPHORUS  --   --    --   --  3.3   TSH  --   --   --   --  0.908         Lab 09/29/24  0604 09/28/24  1214 09/27/24 1930   TOTAL PROTEIN 6.5 5.8* 7.4   ALBUMIN 3.1* 3.0* 3.5   GLOBULIN 3.4 2.8 3.9   ALT (SGPT) 47* 46* 49*   AST (SGOT) 56* 67* 93*   BILIRUBIN 0.2 0.2 0.2   ALK PHOS 130* 114 141*   LIPASE  --   --  28         Lab 09/27/24 1930   PROBNP 667.9   HSTROP T 15*                 Brief Urine Lab Results  (Last result in the past 365 days)        Color   Clarity   Blood   Leuk Est   Nitrite   Protein   CREAT   Urine HCG        09/27/24 2031 Yellow   Turbid   Large (3+)   Large (3+)   Negative   >=300 mg/dL (3+)                   Microbiology Results Abnormal       Procedure Component Value - Date/Time    Blood Culture - Blood, Arm, Right [[id number]]  (Normal) Collected: 09/27/24 1954    Lab Status: Preliminary result Specimen: Blood from Arm, Right Updated: 09/29/24 2131     Blood Culture No growth at 2 days    Blood Culture - Blood, Arm, Left [[id number]]  (Normal) Collected: 09/27/24 2107    Lab Status: Preliminary result Specimen: Blood from Arm, Left Updated: 09/29/24 2131     Blood Culture No growth at 2 days            No radiology results from the last 24 hrs        Current medications:  Scheduled Meds:ALPRAZolam, 0.25 mg, Oral, BID  aspirin, 81 mg, Oral, Daily  busPIRone, 10 mg, Oral, BID  carvedilol, 12.5 mg, Oral, BID With Meals  cefTRIAXone, 2,000 mg, Intravenous, Q24H  cetirizine, 10 mg, Oral, Daily  gabapentin, 300 mg, Oral, TID  lansoprazole, 30 mg, Oral, Q AM  levothyroxine, 25 mcg, Oral, Q AM  mirtazapine, 7.5 mg, Oral, Nightly  sertraline, 100 mg, Oral, Daily  sodium chloride, 10 mL, Intravenous, Q12H  vancomycin, 750 mg, Intravenous, Q12H      Continuous Infusions:Pharmacy to dose vancomycin,       PRN Meds:.  acetaminophen **OR** acetaminophen **OR** acetaminophen    baclofen    senna-docusate sodium **AND** polyethylene glycol **AND** bisacodyl **AND** bisacodyl    Calcium Replacement - Follow Nurse / BPA  "Driven Protocol    ipratropium-albuterol    Magnesium Standard Dose Replacement - Follow Nurse / BPA Driven Protocol    Pharmacy to dose vancomycin    Phosphorus Replacement - Follow Nurse / BPA Driven Protocol    Potassium Replacement - Follow Nurse / BPA Driven Protocol    sodium chloride    sodium chloride    sodium chloride    Assessment & Plan   Assessment & Plan     Active Hospital Problems    Diagnosis  POA    **Sepsis [A41.9]  Yes    COVID-19 virus infection [U07.1]  Yes    Metabolic encephalopathy [G93.41]  Yes    Anxiety disorder due to known physiological condition [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        Brief Hospital Course to date:  Beckie Romeo is a 78 y.o. female resident of [facility] with COPD (on 2 liters), HTN, HLD, hypothyroidism, GERD, cognitive deficit (A&Ox1 at baseline per triage note), and anxiety disorder with possible panic attacks, who was seen at [facility] ED 9/13/24 due to low back pain radiating to her urethra, dysuria, and frequency, she was given Rx of PO Macrobid and returned to her facility (per [facility] micro lab, culture resulted 3+ organisms and was considered contaminated); she was sent to [facility] ED via EMS on 9/27/24 for \"red eyes and a possible UTI,\" on arrival she was noted to have agitated delirium with significant leukocytosis; respiratory PCR was positive for COVID-19, UA was grossly abnormal; she was admitted for sepsis.     This patient's problems and plans were partially entered by my partner and updated as appropriate by me 09/30/24. Copied text in this note has been reviewed and is accurate as of today's date.      Sepsis secondary to UTI  Abnormal CT scan  --s/p Macrobid starting 9/14/24, urine culture from [facility] with mixed kalpesh  --No prior hx of MDRO  --Continue Rocephin and Vanc empirically   --Urine culture here with >100,000 Aerococcus urinae, susceptible to beta " lactams and Vanc - anticipate can stop Vanc tomorrow and continue on cephalosporin to complete 7 day course as she presented with sepsis  -CT A/P on admission with severe cystitis and possible 1.8 cm fistulous tract with gas  --Urology consulted (Dr. Galindo), fistulization felt less likely, potential cystoscopy as outpatient once improved     Baseline cognitive deficit with acute metabolic encephalopathy (agitated delirium)  Depression/anxiety with ?hx of panic attacks  --Facility reports patient as A&Ox1 at baseline  --Patient chronically on benzo's. Restarted Xanax.   --Restarted home Buspar, Zoloft, Remeron     COVID-19 infection  COPD with chronic hypoxia, 2L NC   --Unclear symptom onset, first positive 9/27/24  --CXR clear, no significant acute hypoxia  --Did not receive any treatment for COVID     HTN  HLD  PAF  --Continue home ASA, Coreg     GERD - PPI  Hypothyroidism - levothyroxine  Neuropathy - gabapentin    Expected Discharge Location and Transportation: [place]  Expected Discharge 1-2 days  Expected Discharge Date: 10/1/2024; Expected Discharge Time:      VTE Prophylaxis:  Mechanical VTE prophylaxis orders are present.         AM-PAC 6 Clicks Score (PT): 11 (09/30/24 7045)    CODE STATUS:   Code Status and Medical Interventions: CPR (Attempt to Resuscitate); Full Support   Ordered at: 09/28/24 0038     Level Of Support Discussed With:    Patient     Code Status (Patient has no pulse and is not breathing):    CPR (Attempt to Resuscitate)     Medical Interventions (Patient has pulse or is breathing):    Full Support       Rupali Edwards MD  09/30/24

## 2024-09-30 NOTE — PROGRESS NOTES
"Pharmacy Consult-Vancomycin Dosing  Beckie Romeo is a  78 y.o. female receiving vancomycin therapy.     Indication:sepsis/intra-abdominal infection/UTI  Consulting Provider: hospitalist  ID Consult:     Goal Trough: 15-20 mcg/mL    Allergies  Allergies as of 09/27/2024 - Reviewed 09/27/2024   Allergen Reaction Noted    Codeine Unknown - Low Severity 02/02/2024    Zanaflex [tizanidine hcl] Other (See Comments) 02/02/2024       Labs    Results from last 7 days   Lab Units 09/30/24  0634 09/29/24  0604 09/28/24  1214   BUN mg/dL 16 26* 32*   CREATININE mg/dL 0.66 0.65 0.81       Results from last 7 days   Lab Units 09/30/24  0634 09/29/24  0603 09/28/24  1429   WBC 10*3/mm3 15.90* 18.80* 17.18*       Evaluation of Dosing     Last Dose Received in the ED/Outside Facility: 1250mg  Is Patient on Dialysis or Renal Replacement: no    Ht - 165.1 cm (65\")  Wt - 41.1 kg (90 lb 8 oz)    Estimated Creatinine Clearance: 46.3 mL/min (by C-G formula based on SCr of 0.66 mg/dL).    Intake & Output (last 3 days)       None            Microbiology and Radiology  Microbiology Results (last 10 days)       Procedure Component Value - Date/Time    Blood Culture - Blood, Arm, Left [[id number]]  (Normal) Collected: 09/27/24 2107    Lab Status: Preliminary result Specimen: Blood from Arm, Left Updated: 09/29/24 2131     Blood Culture No growth at 2 days    Urine Culture - Urine, Straight Cath [[id number]]  (Abnormal) Collected: 09/27/24 2031    Lab Status: Final result Specimen: Urine from Straight Cath Updated: 09/30/24 1051     Urine Culture >100,000 CFU/mL Aerococcus urinae     Comment:   Aerococcus spp. are usually susceptible to beta-lactams and vancomycin. Resistance has been reported to the fluoroquinolones. Routine susceptibility testing is not recommended. Please call lab to request further workup.       Narrative:      Colonization of the urinary tract without infection is common. Treatment is discouraged unless the patient is " symptomatic, pregnant, or undergoing an invasive urologic procedure.    COVID PRE-OP / PRE-PROCEDURE SCREENING ORDER (NO ISOLATION) - Swab, Nasopharynx [[id number]]  (Abnormal) Collected: 09/27/24 1954    Lab Status: Final result Specimen: Swab from Nasopharynx Updated: 09/27/24 2059    Narrative:      The following orders were created for panel order COVID PRE-OP / PRE-PROCEDURE SCREENING ORDER (NO ISOLATION) - Swab, Nasopharynx.  Procedure                               Abnormality         Status                     ---------                               -----------         ------                     Respiratory Panel PCR w/...[[id number]]  Abnormal            Final result                 Please view results for these tests on the individual orders.    Blood Culture - Blood, Arm, Right [[id number]]  (Normal) Collected: 09/27/24 1954    Lab Status: Preliminary result Specimen: Blood from Arm, Right Updated: 09/29/24 2131     Blood Culture No growth at 2 days    Respiratory Panel PCR w/COVID-19(SARS-CoV-2) [facility] In-House, NP Swab in UTM/VTM, 2 HR TAT - Swab, Nasopharynx [[id number]]  (Abnormal) Collected: 09/27/24 1954    Lab Status: Final result Specimen: Swab from Nasopharynx Updated: 09/27/24 2059     ADENOVIRUS, PCR Not Detected     Coronavirus 229E Not Detected     Coronavirus HKU1 Not Detected     Coronavirus NL63 Not Detected     Coronavirus OC43 Not Detected     COVID19 Detected     Human Metapneumovirus Not Detected     Human Rhinovirus/Enterovirus Not Detected     Influenza A PCR Not Detected     Influenza B PCR Not Detected     Parainfluenza Virus 1 Not Detected     Parainfluenza Virus 2 Not Detected     Parainfluenza Virus 3 Not Detected     Parainfluenza Virus 4 Not Detected     RSV, PCR Not Detected     Bordetella pertussis pcr Not Detected     Bordetella parapertussis PCR Not Detected     Chlamydophila pneumoniae PCR Not Detected     Mycoplasma pneumo by PCR Not Detected    Narrative:       In the setting of a positive respiratory panel with a viral infection PLUS a negative procalcitonin without other underlying concern for bacterial infection, consider observing off antibiotics or discontinuation of antibiotics and continue supportive care. If the respiratory panel is positive for atypical bacterial infection (Bordetella pertussis, Chlamydophila pneumoniae, or Mycoplasma pneumoniae), consider antibiotic de-escalation to target atypical bacterial infection.            Vancomycin Levels:    Results from last 7 days   Lab Units 09/30/24  0634 09/29/24  0604 09/28/24  1214   VANCOMYCIN RM mcg/mL 10.40 6.60 <4.00*             Assessment/Plan:      Pharmacy dosing Vancomycin for IAI. Goal -600 mg/L*hr.   Vancomycin initiated with 1250mg (~20mg/kg) 9/27.  Vancomycin level 9/30 was 10.4mcg/mL, correlating with a therapeutic current AUC of 400mg/L*hr.   Will continue vancomycin IV 750mg Q12H.   Monitor renal function, culture results, clinical status and adjust as necessary.  Pharmacy will continue to follow.     Sylvia Donohue RPH  9/30/2024  12:56 EDT

## 2024-09-30 NOTE — PLAN OF CARE
Oral care performed, q2h turns, call light within reach, pain management addressed.    Problem: Skin Injury Risk Increased  Goal: Skin Health and Integrity  9/30/2024 0229 by Mary Lou Chaidez RN  Outcome: Progressing  9/29/2024 2315 by Mary Lou Chaidez RN  Outcome: Progressing  Intervention: Optimize Skin Protection  Recent Flowsheet Documentation  Taken 9/30/2024 0000 by Mary Lou Chaidez, RN  Pressure Reduction Techniques: frequent weight shift encouraged  Head of Bed (HOB) Positioning: HOB at 30 degrees  Pressure Reduction Devices: positioning supports utilized  Taken 9/29/2024 2000 by Mary Lou Chaidez RN  Pressure Reduction Techniques:   frequent weight shift encouraged   pressure points protected  Head of Bed (HOB) Positioning: HOB elevated  Pressure Reduction Devices:   positioning supports utilized   pressure-redistributing mattress utilized  Skin Protection:   adhesive use limited   transparent dressing maintained     Problem: Fall Injury Risk  Goal: Absence of Fall and Fall-Related Injury  9/30/2024 0229 by Mary Lou Chaidez, RN  Outcome: Progressing  9/29/2024 2315 by Mary Lou Chaidez RN  Outcome: Progressing  Intervention: Identify and Manage Contributors  Recent Flowsheet Documentation  Taken 9/30/2024 0000 by Mary Lou Chaidez RN  Medication Review/Management: medications reviewed  Taken 9/29/2024 2200 by Mary Lou Chaidez RN  Medication Review/Management: medications reviewed  Taken 9/29/2024 2000 by Mary Lou Chaidez RN  Medication Review/Management: medications reviewed  Self-Care Promotion:   BADL personal objects within reach   safe use of adaptive equipment encouraged  Intervention: Promote Injury-Free Environment  Recent Flowsheet Documentation  Taken 9/30/2024 0000 by Mary Lou Chaidez RN  Safety Promotion/Fall Prevention:   clutter free environment maintained   activity supervised   assistive device/personal items within reach   nonskid shoes/slippers when out of  bed   safety round/check completed  Taken 9/29/2024 2200 by Mary Lou Chaidez RN  Safety Promotion/Fall Prevention:   assistive device/personal items within reach   clutter free environment maintained   nonskid shoes/slippers when out of bed   safety round/check completed  Taken 9/29/2024 2000 by Mary Lou Chaidez RN  Safety Promotion/Fall Prevention:   activity supervised   clutter free environment maintained   assistive device/personal items within reach   nonskid shoes/slippers when out of bed   safety round/check completed   room organization consistent     Problem: Adjustment to Illness (Sepsis/Septic Shock)  Goal: Optimal Coping  9/30/2024 0229 by Mary Lou Chaidez RN  Outcome: Progressing  9/29/2024 2315 by Mary Lou Chaidez RN  Outcome: Progressing  Intervention: Optimize Psychosocial Adjustment to Illness  Recent Flowsheet Documentation  Taken 9/30/2024 0000 by Mary Lou Chaidez RN  Supportive Measures: active listening utilized  Family/Support System Care: support provided  Taken 9/29/2024 2000 by Mary Lou Chaidez RN  Supportive Measures:   active listening utilized   positive reinforcement provided  Family/Support System Care: support provided     Problem: Bleeding (Sepsis/Septic Shock)  Goal: Absence of Bleeding  9/30/2024 0229 by Mary Lou Chaidez RN  Outcome: Progressing  9/29/2024 2315 by Mary Lou Chaidez RN  Outcome: Progressing     Problem: Glycemic Control Impaired (Sepsis/Septic Shock)  Goal: Blood Glucose Level Within Desired Range  9/30/2024 0229 by Mary Lou Chaidez RN  Outcome: Progressing  9/29/2024 2315 by Mary Lou Chaidez RN  Outcome: Progressing  Intervention: Optimize Glycemic Control  Recent Flowsheet Documentation  Taken 9/30/2024 0000 by Mary Lou Chaidez RN  Glycemic Management: blood glucose monitored     Problem: Infection Progression (Sepsis/Septic Shock)  Goal: Absence of Infection Signs and Symptoms  9/30/2024 0229 by Mary Lou Chaidez  RN  Outcome: Progressing  9/29/2024 2315 by Mary Lou Chaidez RN  Outcome: Progressing  Intervention: Initiate Sepsis Management  Recent Flowsheet Documentation  Taken 9/30/2024 0000 by Mary Lou Chaidez RN  Isolation Precautions:   airborne   contact  Taken 9/29/2024 2200 by Mary Lou Chaidez RN  Infection Prevention:   equipment surfaces disinfected   rest/sleep promoted   single patient room provided  Isolation Precautions:   airborne   contact  Taken 9/29/2024 2000 by Mary Lou Chaidez RN  Infection Prevention:   environmental surveillance performed   single patient room provided   equipment surfaces disinfected   rest/sleep promoted  Intervention: Promote Recovery  Recent Flowsheet Documentation  Taken 9/30/2024 0000 by Mary Lou Chaidez RN  Airway/Ventilation Support: pulmonary hygiene promoted  Sleep/Rest Enhancement:   awakenings minimized   noise level reduced   relaxation techniques promoted   room darkened  Taken 9/29/2024 2200 by Mary Lou Chaidez RN  Airway/Ventilation Support: pulmonary hygiene promoted  Sleep/Rest Enhancement: therapeutic touch utilized  Taken 9/29/2024 2000 by Mary Lou Chaidez RN  Activity Management: activity encouraged  Airway/Ventilation Support: pulmonary hygiene promoted  Sleep/Rest Enhancement:   relaxation techniques promoted   room darkened   therapeutic touch utilized     Problem: Nutrition Impaired (Sepsis/Septic Shock)  Goal: Optimal Nutrition Intake  9/30/2024 0229 by Mary Lou Chaidez, RN  Outcome: Progressing  9/29/2024 2315 by Mary Lou Chaidez RN  Outcome: Progressing     Problem: Pain Acute  Goal: Acceptable Pain Control and Functional Ability  9/30/2024 0229 by Mary Lou Chaidez RN  Outcome: Progressing  9/29/2024 2315 by Mary Lou Chaidez RN  Outcome: Progressing  Intervention: Prevent or Manage Pain  Recent Flowsheet Documentation  Taken 9/30/2024 0000 by Mary Lou Chaidez RN  Sensory Stimulation Regulation:   care clustered    lighting decreased  Bowel Elimination Promotion: adequate fluid intake promoted  Sleep/Rest Enhancement:   awakenings minimized   noise level reduced   relaxation techniques promoted   room darkened  Medication Review/Management: medications reviewed  Taken 9/29/2024 2200 by Mary Lou Chaidez RN  Sleep/Rest Enhancement: therapeutic touch utilized  Medication Review/Management: medications reviewed  Taken 9/29/2024 2000 by Mary Lou Chaidez RN  Sensory Stimulation Regulation:   care clustered   television on  Bowel Elimination Promotion: adequate fluid intake promoted  Sleep/Rest Enhancement:   relaxation techniques promoted   room darkened   therapeutic touch utilized  Medication Review/Management: medications reviewed  Intervention: Develop Pain Management Plan  Recent Flowsheet Documentation  Taken 9/30/2024 0000 by Mary Lou Chaidez RN  Pain Management Interventions:   care clustered   pillow support provided   position adjusted  Taken 9/29/2024 2200 by Mary Lou Chaidez RN  Pain Management Interventions:   care clustered   pillow support provided  Taken 9/29/2024 2000 by Mary Lou Chaidez RN  Pain Management Interventions:   care clustered   pillow support provided   position adjusted  Intervention: Optimize Psychosocial Wellbeing  Recent Flowsheet Documentation  Taken 9/30/2024 0000 by Mary Lou Chaidez, RN  Supportive Measures: active listening utilized  Diversional Activities: television  Spiritual Activities Assistance: spiritual support provided  Taken 9/29/2024 2000 by Mary Lou Chaidez RN  Supportive Measures:   active listening utilized   positive reinforcement provided  Diversional Activities: television  Spiritual Activities Assistance: spiritual support provided     Problem: Infection  Goal: Absence of Infection Signs and Symptoms  9/30/2024 0229 by Mary Lou Chaidez, RN  Outcome: Progressing  9/29/2024 2315 by Mary Lou Chadiez RN  Outcome: Progressing  Intervention: Prevent  or Manage Infection  Recent Flowsheet Documentation  Taken 9/30/2024 0000 by Mary Lou Chaidez RN  Isolation Precautions:   airborne   contact  Taken 9/29/2024 2200 by Mary Lou Chaidez RN  Isolation Precautions:   airborne   contact     Problem: Hypertension Comorbidity  Goal: Blood Pressure in Desired Range  9/30/2024 0229 by Mary Lou Chaidez RN  Outcome: Progressing  9/29/2024 2315 by Mary Lou Chaidez RN  Outcome: Progressing  Intervention: Maintain Blood Pressure Management  Recent Flowsheet Documentation  Taken 9/30/2024 0000 by Mary Lou Chaidez RN  Syncope Management: position changed slowly  Medication Review/Management: medications reviewed  Taken 9/29/2024 2200 by Mary Lou Chaidez RN  Medication Review/Management: medications reviewed  Taken 9/29/2024 2000 by Mary Lou Chaidez RN  Syncope Management: position changed slowly  Medication Review/Management: medications reviewed     Problem: Pain Chronic (Persistent) (Comorbidity Management)  Goal: Acceptable Pain Control and Functional Ability  9/30/2024 0229 by Mary Lou Chaidez RN  Outcome: Progressing  9/29/2024 2315 by Mary Lou Chaidez RN  Outcome: Progressing  Intervention: Manage Persistent Pain  Recent Flowsheet Documentation  Taken 9/30/2024 0000 by Mary Lou Chaidez RN  Bowel Elimination Promotion: adequate fluid intake promoted  Sleep/Rest Enhancement:   awakenings minimized   noise level reduced   relaxation techniques promoted   room darkened  Medication Review/Management: medications reviewed  Taken 9/29/2024 2200 by Mary Lou Chaidez, RN  Sleep/Rest Enhancement: therapeutic touch utilized  Medication Review/Management: medications reviewed  Taken 9/29/2024 2000 by Mary Lou Chaidez RN  Bowel Elimination Promotion: adequate fluid intake promoted  Sleep/Rest Enhancement:   relaxation techniques promoted   room darkened   therapeutic touch utilized  Medication Review/Management: medications  reviewed  Intervention: Develop Pain Management Plan  Recent Flowsheet Documentation  Taken 9/30/2024 0000 by Mary Lou Chaidez RN  Pain Management Interventions:   care clustered   pillow support provided   position adjusted  Taken 9/29/2024 2200 by Mary Lou Chaidez RN  Pain Management Interventions:   care clustered   pillow support provided  Taken 9/29/2024 2000 by Mary Lou Chaidez RN  Pain Management Interventions:   care clustered   pillow support provided   position adjusted  Intervention: Optimize Psychosocial Wellbeing  Recent Flowsheet Documentation  Taken 9/30/2024 0000 by Mary Lou Chaidez RN  Supportive Measures: active listening utilized  Diversional Activities: television  Spiritual Activities Assistance: spiritual support provided  Family/Support System Care: support provided  Taken 9/29/2024 2000 by Mary Lou Chaidez RN  Supportive Measures:   active listening utilized   positive reinforcement provided  Diversional Activities: television  Spiritual Activities Assistance: spiritual support provided  Family/Support System Care: support provided     Problem: Adult Inpatient Plan of Care  Goal: Plan of Care Review  9/30/2024 0229 by Mary Lou Chaidez RN  Outcome: Progressing  Flowsheets (Taken 9/30/2024 0229)  Progress: no change  Plan of Care Reviewed With: patient  9/29/2024 2315 by Mary Lou Chaidez RN  Outcome: Progressing  Flowsheets (Taken 9/29/2024 2315)  Progress: no change  Plan of Care Reviewed With: patient  Goal: Patient-Specific Goal (Individualized)  9/30/2024 0229 by Mary Lou Chaidez RN  Outcome: Progressing  9/29/2024 2315 by Mary Lou Chaidez RN  Reactivated  Goal: Absence of Hospital-Acquired Illness or Injury  9/30/2024 0229 by Mary Lou Chaidez RN  Outcome: Progressing  9/29/2024 2315 by Mary Lou Chaidez RN  Reactivated  Intervention: Identify and Manage Fall Risk  Recent Flowsheet Documentation  Taken 9/30/2024 0000 by Mary Lou Chaidez  RN  Safety Promotion/Fall Prevention:   clutter free environment maintained   activity supervised   assistive device/personal items within reach   nonskid shoes/slippers when out of bed   safety round/check completed  Taken 9/29/2024 2200 by Mary Lou Chaidez RN  Safety Promotion/Fall Prevention:   assistive device/personal items within reach   clutter free environment maintained   nonskid shoes/slippers when out of bed   safety round/check completed  Taken 9/29/2024 2000 by Mary Lou Chaidez RN  Safety Promotion/Fall Prevention:   activity supervised   clutter free environment maintained   assistive device/personal items within reach   nonskid shoes/slippers when out of bed   safety round/check completed   room organization consistent  Intervention: Prevent Skin Injury  Recent Flowsheet Documentation  Taken 9/30/2024 0000 by Mary Lou Chaidez RN  Body Position:   turned   tilted  Taken 9/29/2024 2000 by Mary Lou Chaidez RN  Body Position: turned  Skin Protection:   adhesive use limited   transparent dressing maintained  Intervention: Prevent and Manage VTE (Venous Thromboembolism) Risk  Recent Flowsheet Documentation  Taken 9/30/2024 0000 by Mary Lou Chaidez RN  Range of Motion: active ROM (range of motion) encouraged  Taken 9/29/2024 2000 by Mary Lou Chaidez RN  Activity Management: activity encouraged  VTE Prevention/Management:   bilateral   sequential compression devices off   patient refused intervention  Range of Motion:   active ROM (range of motion) encouraged   ROM (range of motion) performed  Intervention: Prevent Infection  Recent Flowsheet Documentation  Taken 9/29/2024 2200 by Mary Lou Chaidez RN  Infection Prevention:   equipment surfaces disinfected   rest/sleep promoted   single patient room provided  Taken 9/29/2024 2000 by Mary Lou Chaidez RN  Infection Prevention:   environmental surveillance performed   single patient room provided   equipment surfaces disinfected    rest/sleep promoted  Goal: Optimal Comfort and Wellbeing  9/30/2024 0229 by Mary Lou Chaidez RN  Outcome: Progressing  9/29/2024 2315 by Mary Lou Chaidez RN  Reactivated  Intervention: Monitor Pain and Promote Comfort  Recent Flowsheet Documentation  Taken 9/30/2024 0000 by Mary Lou Chaidez RN  Pain Management Interventions:   care clustered   pillow support provided   position adjusted  Taken 9/29/2024 2200 by Mary Lou Chaidez RN  Pain Management Interventions:   care clustered   pillow support provided  Taken 9/29/2024 2000 by Mary Lou Chaidez RN  Pain Management Interventions:   care clustered   pillow support provided   position adjusted  Intervention: Provide Person-Centered Care  Recent Flowsheet Documentation  Taken 9/30/2024 0000 by Mary Lou Chaidez RN  Trust Relationship/Rapport:   questions answered   emotional support provided   empathic listening provided   care explained   choices provided  Taken 9/29/2024 2000 by Mary Lou Chaidez, DEEPIKA  Trust Relationship/Rapport:   care explained   choices provided   emotional support provided   empathic listening provided   questions answered   questions encouraged   reassurance provided   thoughts/feelings acknowledged  Goal: Readiness for Transition of Care  9/30/2024 0229 by Mary Lou Chaidez, RN  Outcome: Progressing  9/29/2024 2315 by Mary Lou Chaidez RN  Reactivated   Goal Outcome Evaluation:  Plan of Care Reviewed With: patient        Progress: no change

## 2024-10-01 VITALS
BODY MASS INDEX: 15.08 KG/M2 | SYSTOLIC BLOOD PRESSURE: 135 MMHG | DIASTOLIC BLOOD PRESSURE: 70 MMHG | RESPIRATION RATE: 16 BRPM | WEIGHT: 90.5 LBS | OXYGEN SATURATION: 91 % | TEMPERATURE: 97.9 F | HEART RATE: 104 BPM | HEIGHT: 65 IN

## 2024-10-01 PROBLEM — A41.9 SEPSIS: Status: RESOLVED | Noted: 2024-09-28 | Resolved: 2024-10-01

## 2024-10-01 PROBLEM — N39.0 UTI (URINARY TRACT INFECTION), BACTERIAL: Status: ACTIVE | Noted: 2024-10-01

## 2024-10-01 PROBLEM — A49.9 UTI (URINARY TRACT INFECTION), BACTERIAL: Status: ACTIVE | Noted: 2024-10-01

## 2024-10-01 PROBLEM — G93.41 METABOLIC ENCEPHALOPATHY: Status: RESOLVED | Noted: 2024-09-28 | Resolved: 2024-10-01

## 2024-10-01 LAB
ANION GAP SERPL CALCULATED.3IONS-SCNC: 8 MMOL/L (ref 5–15)
BUN SERPL-MCNC: 15 MG/DL (ref 8–23)
BUN/CREAT SERPL: 25.9 (ref 7–25)
CALCIUM SPEC-SCNC: 9 MG/DL (ref 8.6–10.5)
CHLORIDE SERPL-SCNC: 106 MMOL/L (ref 98–107)
CO2 SERPL-SCNC: 30 MMOL/L (ref 22–29)
CREAT SERPL-MCNC: 0.58 MG/DL (ref 0.57–1)
DEPRECATED RDW RBC AUTO: 53 FL (ref 37–54)
EGFRCR SERPLBLD CKD-EPI 2021: 93.3 ML/MIN/1.73
ERYTHROCYTE [DISTWIDTH] IN BLOOD BY AUTOMATED COUNT: 15.2 % (ref 12.3–15.4)
GLUCOSE SERPL-MCNC: 62 MG/DL (ref 65–99)
HCT VFR BLD AUTO: 32.2 % (ref 34–46.6)
HGB BLD-MCNC: 9.4 G/DL (ref 12–15.9)
MCH RBC QN AUTO: 27.6 PG (ref 26.6–33)
MCHC RBC AUTO-ENTMCNC: 29.2 G/DL (ref 31.5–35.7)
MCV RBC AUTO: 94.4 FL (ref 79–97)
PLATELET # BLD AUTO: 492 10*3/MM3 (ref 140–450)
PMV BLD AUTO: 9.2 FL (ref 6–12)
POTASSIUM SERPL-SCNC: 4.5 MMOL/L (ref 3.5–5.2)
RBC # BLD AUTO: 3.41 10*6/MM3 (ref 3.77–5.28)
SODIUM SERPL-SCNC: 144 MMOL/L (ref 136–145)
WBC NRBC COR # BLD AUTO: 16.38 10*3/MM3 (ref 3.4–10.8)

## 2024-10-01 PROCEDURE — 97162 PT EVAL MOD COMPLEX 30 MIN: CPT

## 2024-10-01 PROCEDURE — 97530 THERAPEUTIC ACTIVITIES: CPT

## 2024-10-01 PROCEDURE — 80048 BASIC METABOLIC PNL TOTAL CA: CPT | Performed by: HOSPITALIST

## 2024-10-01 PROCEDURE — 99238 HOSP IP/OBS DSCHRG MGMT 30/<: CPT | Performed by: HOSPITALIST

## 2024-10-01 PROCEDURE — 85027 COMPLETE CBC AUTOMATED: CPT | Performed by: HOSPITALIST

## 2024-10-01 PROCEDURE — 25010000002 CEFTRIAXONE PER 250 MG: Performed by: INTERNAL MEDICINE

## 2024-10-01 PROCEDURE — 92526 ORAL FUNCTION THERAPY: CPT

## 2024-10-01 RX ORDER — CEFUROXIME AXETIL 250 MG/1
250 TABLET ORAL 2 TIMES DAILY
Qty: 6 TABLET | Refills: 0 | Status: SHIPPED | OUTPATIENT
Start: 2024-10-02 | End: 2024-10-05

## 2024-10-01 RX ORDER — TRAMADOL HYDROCHLORIDE 50 MG/1
50 TABLET ORAL EVERY 6 HOURS PRN
Qty: 12 TABLET | Refills: 0 | Status: SHIPPED | OUTPATIENT
Start: 2024-10-01

## 2024-10-01 RX ORDER — GABAPENTIN 300 MG/1
300 CAPSULE ORAL 3 TIMES DAILY
Qty: 9 CAPSULE | Refills: 0 | Status: SHIPPED | OUTPATIENT
Start: 2024-10-01

## 2024-10-01 RX ORDER — ALPRAZOLAM 0.25 MG
0.25 TABLET ORAL 2 TIMES DAILY
Qty: 6 TABLET | Refills: 0 | Status: SHIPPED | OUTPATIENT
Start: 2024-10-01

## 2024-10-01 RX ADMIN — ALPRAZOLAM 0.25 MG: 0.25 TABLET ORAL at 11:14

## 2024-10-01 RX ADMIN — LEVOTHYROXINE SODIUM 25 MCG: 25 TABLET ORAL at 05:33

## 2024-10-01 RX ADMIN — GABAPENTIN 300 MG: 300 CAPSULE ORAL at 15:32

## 2024-10-01 RX ADMIN — SERTRALINE 100 MG: 100 TABLET, FILM COATED ORAL at 11:12

## 2024-10-01 RX ADMIN — CETIRIZINE HYDROCHLORIDE 10 MG: 10 TABLET, FILM COATED ORAL at 11:20

## 2024-10-01 RX ADMIN — SODIUM CHLORIDE 2000 MG: 900 INJECTION INTRAVENOUS at 11:14

## 2024-10-01 RX ADMIN — BUSPIRONE HYDROCHLORIDE 10 MG: 10 TABLET ORAL at 11:13

## 2024-10-01 RX ADMIN — ASPIRIN 81 MG 81 MG: 81 TABLET ORAL at 11:14

## 2024-10-01 RX ADMIN — CARVEDILOL 12.5 MG: 12.5 TABLET, FILM COATED ORAL at 11:14

## 2024-10-01 RX ADMIN — LANSOPRAZOLE 30 MG: 15 TABLET, ORALLY DISINTEGRATING ORAL at 05:33

## 2024-10-01 RX ADMIN — GABAPENTIN 300 MG: 300 CAPSULE ORAL at 11:14

## 2024-10-01 NOTE — PLAN OF CARE
Problem: Skin Injury Risk Increased  Goal: Skin Health and Integrity  Outcome: Progressing  Intervention: Optimize Skin Protection  Recent Flowsheet Documentation  Taken 9/30/2024 2000 by Mary Lou Chaidez RN  Pressure Reduction Techniques:   frequent weight shift encouraged   weight shift assistance provided  Head of Bed (HOB) Positioning: HOB elevated  Pressure Reduction Devices: positioning supports utilized  Skin Protection:   hydrocolloids used   skin-to-device areas padded   skin-to-skin areas padded   transparent dressing maintained   tubing/devices free from skin contact   skin sealant/moisture barrier applied     Problem: Fall Injury Risk  Goal: Absence of Fall and Fall-Related Injury  Outcome: Progressing  Intervention: Identify and Manage Contributors  Recent Flowsheet Documentation  Taken 9/30/2024 2000 by Mary Lou Chaidez RN  Medication Review/Management: medications reviewed  Self-Care Promotion:   independence encouraged   BADL personal objects within reach   BADL personal routines maintained   meal set-up provided  Intervention: Promote Injury-Free Environment  Recent Flowsheet Documentation  Taken 9/30/2024 2000 by Mary Lou Chaidez RN  Safety Promotion/Fall Prevention:   clutter free environment maintained   nonskid shoes/slippers when out of bed   safety round/check completed   room organization consistent     Problem: Adjustment to Illness (Sepsis/Septic Shock)  Goal: Optimal Coping  Outcome: Progressing  Intervention: Optimize Psychosocial Adjustment to Illness  Recent Flowsheet Documentation  Taken 9/30/2024 2000 by Mary Lou Chaidez RN  Supportive Measures:   active listening utilized   relaxation techniques promoted  Family/Support System Care: support provided     Problem: Bleeding (Sepsis/Septic Shock)  Goal: Absence of Bleeding  Outcome: Progressing     Problem: Glycemic Control Impaired (Sepsis/Septic Shock)  Goal: Blood Glucose Level Within Desired Range  Outcome:  Progressing     Problem: Infection Progression (Sepsis/Septic Shock)  Goal: Absence of Infection Signs and Symptoms  Outcome: Progressing  Intervention: Initiate Sepsis Management  Recent Flowsheet Documentation  Taken 9/30/2024 2000 by Mary Lou Chaidez RN  Infection Prevention:   rest/sleep promoted   single patient room provided  Isolation Precautions:   airborne   contact  Intervention: Promote Recovery  Recent Flowsheet Documentation  Taken 9/30/2024 2000 by Mary Lou Chaidez RN  Activity Management: activity encouraged  Airway/Ventilation Support: pulmonary hygiene promoted  Sleep/Rest Enhancement:   relaxation techniques promoted   therapeutic touch utilized     Problem: Nutrition Impaired (Sepsis/Septic Shock)  Goal: Optimal Nutrition Intake  Outcome: Progressing     Problem: Pain Acute  Goal: Acceptable Pain Control and Functional Ability  Outcome: Progressing  Intervention: Prevent or Manage Pain  Recent Flowsheet Documentation  Taken 9/30/2024 2000 by Mary Lou Chaidez RN  Sensory Stimulation Regulation:   care clustered   television on  Bowel Elimination Promotion: adequate fluid intake promoted  Sleep/Rest Enhancement:   relaxation techniques promoted   therapeutic touch utilized  Medication Review/Management: medications reviewed  Intervention: Develop Pain Management Plan  Recent Flowsheet Documentation  Taken 9/30/2024 2000 by Mary Lou Chaidez RN  Pain Management Interventions: care clustered  Intervention: Optimize Psychosocial Wellbeing  Recent Flowsheet Documentation  Taken 9/30/2024 2000 by Mary Lou Chaidez RN  Supportive Measures:   active listening utilized   relaxation techniques promoted  Diversional Activities: television     Problem: Infection  Goal: Absence of Infection Signs and Symptoms  Outcome: Progressing  Intervention: Prevent or Manage Infection  Recent Flowsheet Documentation  Taken 9/30/2024 2000 by Mary Lou Chaidez RN  Isolation Precautions:   airborne    contact     Problem: Hypertension Comorbidity  Goal: Blood Pressure in Desired Range  Outcome: Progressing  Intervention: Maintain Blood Pressure Management  Recent Flowsheet Documentation  Taken 9/30/2024 2000 by Mary Lou Chaidez RN  Syncope Management: position changed slowly  Medication Review/Management: medications reviewed     Problem: Pain Chronic (Persistent) (Comorbidity Management)  Goal: Acceptable Pain Control and Functional Ability  Outcome: Progressing  Intervention: Manage Persistent Pain  Recent Flowsheet Documentation  Taken 9/30/2024 2000 by Mary Lou Chaidez RN  Bowel Elimination Promotion: adequate fluid intake promoted  Sleep/Rest Enhancement:   relaxation techniques promoted   therapeutic touch utilized  Medication Review/Management: medications reviewed  Intervention: Develop Pain Management Plan  Recent Flowsheet Documentation  Taken 9/30/2024 2000 by Mary Lou Chaidez RN  Pain Management Interventions: care clustered  Intervention: Optimize Psychosocial Wellbeing  Recent Flowsheet Documentation  Taken 9/30/2024 2000 by Mary Lou Chaidez RN  Supportive Measures:   active listening utilized   relaxation techniques promoted  Diversional Activities: television  Family/Support System Care: support provided     Problem: Adult Inpatient Plan of Care  Goal: Plan of Care Review  Outcome: Progressing  Flowsheets (Taken 9/30/2024 2347)  Progress: no change  Plan of Care Reviewed With: patient  Goal: Patient-Specific Goal (Individualized)  Outcome: Progressing  Goal: Absence of Hospital-Acquired Illness or Injury  Outcome: Progressing  Intervention: Identify and Manage Fall Risk  Recent Flowsheet Documentation  Taken 9/30/2024 2000 by Mary Lou Chaidez RN  Safety Promotion/Fall Prevention:   clutter free environment maintained   nonskid shoes/slippers when out of bed   safety round/check completed   room organization consistent  Intervention: Prevent Skin Injury  Recent Flowsheet  Documentation  Taken 9/30/2024 2000 by Mary Lou Chaidez, RN  Body Position: left  Skin Protection:   hydrocolloids used   skin-to-device areas padded   skin-to-skin areas padded   transparent dressing maintained   tubing/devices free from skin contact   skin sealant/moisture barrier applied  Intervention: Prevent and Manage VTE (Venous Thromboembolism) Risk  Recent Flowsheet Documentation  Taken 9/30/2024 2000 by Mary Lou Chaidez, RN  Activity Management: activity encouraged  Range of Motion:   active ROM (range of motion) encouraged   ROM (range of motion) performed  Intervention: Prevent Infection  Recent Flowsheet Documentation  Taken 9/30/2024 2000 by Mary Lou Chaidez, RN  Infection Prevention:   rest/sleep promoted   single patient room provided  Goal: Optimal Comfort and Wellbeing  Outcome: Progressing  Intervention: Monitor Pain and Promote Comfort  Recent Flowsheet Documentation  Taken 9/30/2024 2000 by Mary Lou Chaidez, RN  Pain Management Interventions: care clustered  Intervention: Provide Person-Centered Care  Recent Flowsheet Documentation  Taken 9/30/2024 2100 by Mary Lou Chaidez, RN  Trust Relationship/Rapport: empathic listening provided  Taken 9/30/2024 2000 by Mary Lou Chaidez RN  Trust Relationship/Rapport:   emotional support provided   empathic listening provided   questions encouraged   questions answered   reassurance provided   thoughts/feelings acknowledged   care explained   choices provided  Goal: Readiness for Transition of Care  Outcome: Progressing   Goal Outcome Evaluation:  Plan of Care Reviewed With: patient        Progress: no change

## 2024-10-01 NOTE — DISCHARGE SUMMARY
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 female resident of [facility] with COPD (on 2 liters), HTN, HLD, hypothyroidism, GERD, cognitive deficit (A&Ox1 at baseline per triage note), and anxiety disorder with possible panic attacks, who was seen at [facility] ED 24 due to low back pain radiating to her urethra, dysuria, and frequency, she was given Rx of PO Macrobid and returned to her facility (per [facility] micro lab, culture resulted 3+ organisms and was considered contaminated); she was sent to [facility] ED via EMS on 24 for \"red eyes and a possible UTI,\" on arrival she was noted to have agitated delirium with significant leukocytosis; respiratory PCR was positive for COVID-19, UA was grossly abnormal; she was admitted for sepsis.     Sepsis secondary to UTI  Abnormal CT scan - possible colovesical fistula?  --s/p Macrobid starting 24, urine culture from [facility] with mixed kalpesh  --No prior hx of MDRO  --Continued on Rocephin and Vanc empirically   --Urine culture here with >100,000 Aerococcus urinae, susceptible to beta lactams and Vanc - " stop Vanc and continue on cephalosporin to complete 7 day course as she presented with sepsis (PO Ceftin 250 mg BID x 3 more days starting 10/2/24)  --CT A/P on admission with severe cystitis and possible 1.8 cm fistulous tract with gas  --Urology consulted (Dr. Galindo), fistulization felt less likely, tentative cystoscopy as outpatient once improved     Baseline cognitive deficit with acute metabolic encephalopathy (agitated delirium)  Depression/anxiety with ?hx of panic attacks  --Facility reports patient as A&Ox1 at baseline  --Patient chronically on benzo's. Continue Xanax.   --Continue home Buspar, Zoloft, Remeron     COVID-19 infection  COPD with chronic hypoxia, 2L NC   --Unclear symptom onset, first positive 9/27/24  --CXR clear, no significant acute hypoxia  --Did not receive any treatment for COVID  --Can complete 5 days isolation through today, no need for further isolation once back at facility unless they have their own protocol     HTN  HLD  PAF  --Continue home ASA, Coreg     GERD - PPI  Hypothyroidism - levothyroxine  Neuropathy - gabapentin      Discharge Follow Up Recommendations for outpatient labs/diagnostics:  F/U with PCP 1 week  F/U with Urology/Dr. Galindo in 2-4 weeks    Day of Discharge     HPI:   Seen this morning. Worked with PT. No complaints, just wants to get out of the hospital.    Review of Systems  Gen-no fevers, no chills  CV-no chest pain, no palpitations  Resp-no cough, no dyspnea  GI-no N/V/D, no abd pain      Vital Signs:   Temp:  [97.2 °F (36.2 °C)-97.9 °F (36.6 °C)] 97.9 °F (36.6 °C)  Heart Rate:  [] 104  Resp:  [16-17] 16  BP: (114-149)/(50-70) 135/70  Flow (L/min):  [1-2] 1      Physical Exam:  Gen-no acute distress  HENT-NCAT, mucous membranes moist  CV-RRR, S1 S2 normal, no m/r/g  Resp-CTAB, no wheezes or rales  Abd-soft, NT, ND, +BS  Neuro-A&Ox1, no focal deficits  Skin-no rashes  Psych-appropriate mood      Pertinent  and/or Most Recent Results     LAB RESULTS:       Lab 10/01/24  0640 09/30/24  0634 09/29/24  0604 09/29/24  0603 09/28/24  1429 09/27/24  1930   WBC 16.38* 15.90*  --  18.80* 17.18* 27.42*   HEMOGLOBIN 9.4* 9.3*  --  9.2* 9.0* 9.7*   HEMATOCRIT 32.2* 32.2*  --  31.9* 30.4* 33.4*   PLATELETS 492* 497*  --  527* 491* 605*   NEUTROS ABS  --  12.28*  --  15.28* 14.57* 22.86*   IMMATURE GRANS (ABS)  --  0.13*  --  0.19* 0.14* 0.34*   LYMPHS ABS  --  1.43  --  1.58 1.15 1.96   MONOS ABS  --  1.71*  --  1.58* 1.28* 2.20*   EOS ABS  --  0.32  --  0.14 0.02 0.01   MCV 94.4 94.4  --  93.5 92.4 92.5   CRP  --   --  7.54*  --   --  8.74*   PROCALCITONIN  --   --   --   --   --  0.09   LACTATE  --   --   --   --   --  1.0         Lab 10/01/24  0640 09/30/24  0634 09/29/24  1744 09/29/24  0604 09/28/24  1214 09/27/24  1930   SODIUM 144 146*  --  147* 143 144   POTASSIUM 4.5 4.4 3.6 3.5 3.9 4.3   CHLORIDE 106 108*  --  108* 104 101   CO2 30.0* 28.0  --  29.0 28.0 33.0*   ANION GAP 8.0 10.0  --  10.0 11.0 10.0   BUN 15 16  --  26* 32* 41*   CREATININE 0.58 0.66  --  0.65 0.81 1.11*   EGFR 93.3 90.5  --  90.8 74.9 51.3*   GLUCOSE 62* 90  --  67 95 121*   CALCIUM 9.0 9.0  --  8.9 8.9 9.3   MAGNESIUM  --   --   --   --   --  2.5*   PHOSPHORUS  --   --   --   --   --  3.3   TSH  --   --   --   --   --  0.908         Lab 09/29/24  0604 09/28/24  1214 09/27/24 1930   TOTAL PROTEIN 6.5 5.8* 7.4   ALBUMIN 3.1* 3.0* 3.5   GLOBULIN 3.4 2.8 3.9   ALT (SGPT) 47* 46* 49*   AST (SGOT) 56* 67* 93*   BILIRUBIN 0.2 0.2 0.2   ALK PHOS 130* 114 141*   LIPASE  --   --  28         Lab 09/27/24 1930   PROBNP 667.9   HSTROP T 15*                 Brief Urine Lab Results  (Last result in the past 365 days)        Color   Clarity   Blood   Leuk Est   Nitrite   Protein   CREAT   Urine HCG        09/27/24 2031 Yellow   Turbid   Large (3+)   Large (3+)   Negative   >=300 mg/dL (3+)                 Microbiology Results (last 10 days)       Procedure Component Value - Date/Time    Blood Culture - Blood,  Arm, Left [[id number]]  (Normal) Collected: 09/27/24 2107    Lab Status: Preliminary result Specimen: Blood from Arm, Left Updated: 09/30/24 2131     Blood Culture No growth at 3 days    Urine Culture - Urine, Straight Cath [[id number]]  (Abnormal) Collected: 09/27/24 2031    Lab Status: Final result Specimen: Urine from Straight Cath Updated: 09/30/24 1051     Urine Culture >100,000 CFU/mL Aerococcus urinae     Comment:   Aerococcus spp. are usually susceptible to beta-lactams and vancomycin. Resistance has been reported to the fluoroquinolones. Routine susceptibility testing is not recommended. Please call lab to request further workup.       Narrative:      Colonization of the urinary tract without infection is common. Treatment is discouraged unless the patient is symptomatic, pregnant, or undergoing an invasive urologic procedure.    COVID PRE-OP / PRE-PROCEDURE SCREENING ORDER (NO ISOLATION) - Swab, Nasopharynx [[id number]]  (Abnormal) Collected: 09/27/24 1954    Lab Status: Final result Specimen: Swab from Nasopharynx Updated: 09/27/24 2059    Narrative:      The following orders were created for panel order COVID PRE-OP / PRE-PROCEDURE SCREENING ORDER (NO ISOLATION) - Swab, Nasopharynx.  Procedure                               Abnormality         Status                     ---------                               -----------         ------                     Respiratory Panel PCR w/...[[id number]]  Abnormal            Final result                 Please view results for these tests on the individual orders.    Blood Culture - Blood, Arm, Right [[id number]]  (Normal) Collected: 09/27/24 1954    Lab Status: Preliminary result Specimen: Blood from Arm, Right Updated: 09/30/24 2131     Blood Culture No growth at 3 days    Respiratory Panel PCR w/COVID-19(SARS-CoV-2) [facility] In-House, NP Swab in UTM/VTM, 2 HR TAT - Swab, Nasopharynx [[id number]]  (Abnormal) Collected: 09/27/24 1954    Lab Status: Final  result Specimen: Swab from Nasopharynx Updated: 09/27/24 2059     ADENOVIRUS, PCR Not Detected     Coronavirus 229E Not Detected     Coronavirus HKU1 Not Detected     Coronavirus NL63 Not Detected     Coronavirus OC43 Not Detected     COVID19 Detected     Human Metapneumovirus Not Detected     Human Rhinovirus/Enterovirus Not Detected     Influenza A PCR Not Detected     Influenza B PCR Not Detected     Parainfluenza Virus 1 Not Detected     Parainfluenza Virus 2 Not Detected     Parainfluenza Virus 3 Not Detected     Parainfluenza Virus 4 Not Detected     RSV, PCR Not Detected     Bordetella pertussis pcr Not Detected     Bordetella parapertussis PCR Not Detected     Chlamydophila pneumoniae PCR Not Detected     Mycoplasma pneumo by PCR Not Detected    Narrative:      In the setting of a positive respiratory panel with a viral infection PLUS a negative procalcitonin without other underlying concern for bacterial infection, consider observing off antibiotics or discontinuation of antibiotics and continue supportive care. If the respiratory panel is positive for atypical bacterial infection (Bordetella pertussis, Chlamydophila pneumoniae, or Mycoplasma pneumoniae), consider antibiotic de-escalation to target atypical bacterial infection.            CT Abdomen Pelvis With Contrast    Result Date: 9/27/2024  CT ABDOMEN PELVIS W CONTRAST Date of Exam: 9/27/2024 10:06 PM EDT Indication: Sepsis, altered mental status, tenderness, gross pyuria. Comparison: None available. Technique: Axial CT images were obtained of the abdomen and pelvis following the uneventful intravenous administration of Isovue-300, 80 mL IV. Reconstructed coronal and sagittal images were also obtained. Automated exposure control and iterative construction methods were used. Findings: Motion artifact limits diagnostic sensitivity. Lung Bases: Bilateral dependent atelectasis versus scarring is visualized. Peritoneum: No free intraperitoneal air or  fluid. Abdominal wall: No evidence of abdominal hernia. There is suggestion of prior laparotomy. Liver: The liver is within normal limits in size and contour. Scattered punctate granulomas are visualized. The portal vein is patent. Biliary/Gallbladder: The gallbladder is surgically absent. The biliary tree is nondilated. Pancreas: Pancreas is within normal limits. There is no evidence of pancreatic mass or peripancreatic inflammatory changes. Spleen: Spleen is normal in size and contour. Scattered granulomas are visualized. Gastrointestinal/Mesentery: No evidence of bowel obstruction or gross inflammatory changes. The appendix appears within normal limits.  Adrenals: Adrenal glands are unremarkable. Kidneys: The kidneys are in anatomic position. Multiple nonobstructing right-sided renal calculi are visualized measuring up to 1 cm in the right renal pelvis. No evidence of hydronephrosis or significant perinephric fat stranding. Cyst is visualized in the lower  pole of the left kidney measuring 2.8 cm. Bladder: There is marked urinary bladder wall thickening and mucosal hyperenhancement. Air-filled tract is visualized in the anterior wall of the urinary bladder measuring 1.8 cm in length and 0.7 cm in width. Finding may be from prior suprapubic urinary bladder catheter placement. Small volume air is visualized in the urinary bladder. Reproductive organs:  The patient is post hysterectomy. Lymph Nodes: No significant adenopathy is identified. Vasculature: Moderate aortoiliac atheromatous changes are visualized. The abdominal aorta is normal in caliber. Osseous Structures:  No acute fracture or aggressive lesions. Mild to moderate degenerative changes of the lumbar spine are visualized. There is levoscoliosis of the lumbar spine.     Impression: Motion artifact limits evaluation. Findings consistent with severe cystitis. There is a linear air-filled tract in the anterior wall of the urinary bladder measuring 1.8 cm in  length and 0.7 cm in width. Finding may be from prior suprapubic urinary bladder catheter placement. Small volume  air is also visualized in the urinary bladder. If there is no history of recent catheterization or instrumentation, infection with gas-forming organism or occult vesicoenteric fistula is a possibility. Nonobstructing right-sided nephrolithiasis. Ancillary findings as above. Electronically Signed: Quinton Vasquez MD  9/27/2024 10:34 PM EDT  Workstation ID: [id number]    CT Head Without Contrast    Result Date: 9/27/2024  CT HEAD WO CONTRAST Date of Exam: 9/27/2024 8:57 PM EDT Indication: altered mental status, suspect metabolic. Comparison: None available. Technique: Axial CT images were obtained of the head without contrast administration.  Automated exposure control and iterative construction methods were used. Findings: No evidence of acute intracranial hemorrhage or mass effect. No extra-axial collection. The gray white matter differentiation is preserved. Global parenchymal atrophy. Periventricular and subcortical white matter hypodensity, nonspecific, but likely sequela of chronic small vessel ischemic disease. The mastoid air cells and paranasal sinuses are well aerated subtle opacification of the left sphenoid sinus. Globes and extraocular muscles are unremarkable. No acute or suspicious osseous abnormality. Soft tissues within normal limits.     Impression: No evidence of acute intracranial abnormality. Typical chronic/age-related findings including brain atrophy and white matter change suggestive of chronic small vessel ischemia. Electronically Signed: Oziel Muniz MD  9/27/2024 9:20 PM EDT  Workstation ID: [id number]    XR Hand 3+ View Right    Result Date: 9/27/2024  XR HAND 3+ VW RIGHT Date of Exam: 9/27/2024 8:48 PM EDT Indication: pain with out known trauma,but significant dementia Comparison: None available. Findings: No evidence of acute fracture. No joint malalignment. Minimal scattered  degenerative changes predominately involving the interphalangeal joints. Bones are demineralized. Soft tissues appear within normal limits.     Impression: No acute fracture or joint malalignment. Minimal scattered degenerative changes involving the interphalangeal joints. Bones are demineralized. Electronically Signed: Oziel Muniz MD  9/27/2024 8:56 PM EDT  Workstation ID: [id number]    XR Chest 1 View    Result Date: 9/27/2024  XR CHEST 1 VW Date of Exam: 9/27/2024 8:08 PM EDT Indication: Weak/Dizzy/AMS triage protocol Comparison: Noncontrast chest CT performed on March 23, 2024 Findings: No acute infiltrate. Stable calcified granulomas in the left upper lobe. Left basilar scarring is visualized with associated pleural thickening. Lungs are hyper aerated. There is no evidence of pneumothorax.  Linear lucency adjacent to the left heart border was noted on prior chest imaging, likely artifactual secondary to adjacent scarring. The cardiac and mediastinal silhouette appear unremarkable. Atheromatous aortic calcifications are visualized. No acute osseous abnormality identified.     Impression: No acute chest process evident. Changes of COPD Stable left upper lobe granulomas and left basilar scarring. Electronically Signed: Quinton Vasquez MD  9/27/2024 8:14 PM EDT  Workstation ID: [id number]               Pending Labs       Order Current Status    Blood Culture - Blood, Arm, Left Preliminary result    Blood Culture - Blood, Arm, Right Preliminary result          Discharge Details        Discharge Medications        New Medications        Instructions Start Date   cefuroxime 250 MG tablet  Commonly known as: CEFTIN   250 mg, Oral, 2 Times Daily   Start Date: October 2, 2024            Continue These Medications        Instructions Start Date   acetaminophen 325 MG tablet  Commonly known as: TYLENOL   650 mg, Oral, 2 Times Daily      albuterol sulfate  (90 Base) MCG/ACT inhaler  Commonly known as: PROVENTIL  HFA;VENTOLIN HFA;PROAIR HFA   2 puffs, Inhalation, Every 6 Hours PRN      ALPRAZolam 0.25 MG tablet  Commonly known as: XANAX   0.25 mg, Oral, 2 Times Daily      aspirin 81 MG EC tablet   81 mg, Oral, Daily      baclofen 10 MG tablet  Commonly known as: LIORESAL   10 mg, Oral, 3 Times Daily PRN      Breo Ellipta 200-25 MCG/ACT inhaler  Generic drug: Fluticasone Furoate-Vilanterol   1 puff, Inhalation, Daily - RT      busPIRone 5 MG tablet  Commonly known as: BUSPAR   10 mg, Oral, 2 Times Daily      carvedilol 12.5 MG tablet  Commonly known as: COREG   12.5 mg, Oral, 2 Times Daily With Meals, Hold if BP less than 110/60 or HR <60/      cholecalciferol 25 MCG (1000 UT) tablet  Commonly known as: VITAMIN D3   2,000 Units, Oral, Daily      CLARITIN PO   10 mg, Oral, Nightly      Diclofenac Sodium 1 % gel gel  Commonly known as: VOLTAREN   2 g, Topical, 4 Times Daily PRN      fluticasone 50 MCG/ACT nasal spray  Commonly known as: FLONASE   2 sprays, Nasal, Daily      gabapentin 300 MG capsule  Commonly known as: NEURONTIN   300 mg, Oral, 3 Times Daily      GenTeal Tears 0.1-0.2-0.3 % solution   2 drops, Ophthalmic, 3 Times Daily, 1-2 drops in bilateral eyes      GUAIFENESIN ER PO   600 mg, Oral, 2 Times Daily      ipratropium-albuterol 0.5-2.5 mg/3 ml nebulizer  Commonly known as: DUO-NEB   3 mL, Nebulization, 3 Times Daily PRN      levothyroxine 25 MCG tablet  Commonly known as: SYNTHROID, LEVOTHROID   25 mcg, Oral, Every Early Morning      loperamide 2 MG capsule  Commonly known as: IMODIUM   2 mg, Oral, 4 Times Daily PRN, Give 2 tabs after 1st loose stool; give 1 tablet for any loose stools after. Max dose 4 tablets within a 24 hr period.      magnesium oxide 400 MG tablet  Commonly known as: MAG-OX   400 mg, Oral, Daily      melatonin 3 MG tablet   6 mg, Oral, Nightly      mirtazapine 15 MG tablet  Commonly known as: REMERON   7.5 mg, Oral, Nightly      ondansetron 4 MG tablet  Commonly known as: ZOFRAN   4 mg, Oral,  4 Times Daily PRN      pantoprazole 40 MG EC tablet  Commonly known as: PROTONIX   40 mg, Oral, Daily      POTASSIUM & SODIUM PHOSPHATES PO   250 mg, Oral, Daily, 250mg - 45 mg - 298 mg tablet      sertraline 100 MG tablet  Commonly known as: ZOLOFT   100 mg, Oral, Daily      traMADol 50 MG tablet  Commonly known as: ULTRAM   50 mg, Oral, Every 6 Hours PRN      vitamin E 400 UNIT capsule   400 Units, Oral, Daily               Allergies   Allergen Reactions    Codeine Unknown - Low Severity    Egg-Derived Products Other (See Comments)     Unknown reaction/severity; daughter reported possible egg allergy    Zanaflex [Tizanidine Hcl] Other (See Comments)     Low blood pressure         Discharge Disposition:  Skilled Nursing Facility (DC - External)    Diet:  Hospital:  Diet Order   Procedures    Diet: Cardiac; Healthy Heart (2-3 Na+); Texture: Mechanical Ground (NDD 2); Fluid Consistency: Thin (IDDSI 0)       Diet Instructions       Diet: Cardiac Diets; Healthy Heart (2-3 Na+); Mechanical Ground (NDD 2); Thin (IDDSI 0)      Discharge Diet: Cardiac Diets    Cardiac Diet: Healthy Heart (2-3 Na+)    Texture: Mechanical Ground (NDD 2)    Fluid Consistency: Thin (IDDSI 0)             Activity:  Activity Instructions       Activity as Tolerated                 CODE STATUS:    Code Status and Medical Interventions: CPR (Attempt to Resuscitate); Full Support   Ordered at: 09/28/24 0038     Level Of Support Discussed With:    Patient     Code Status (Patient has no pulse and is not breathing):    CPR (Attempt to Resuscitate)     Medical Interventions (Patient has pulse or is breathing):    Full Support       No future appointments.    Additional Instructions for the Follow-ups that You Need to Schedule       Discharge Follow-up with PCP   As directed       Currently Documented PCP:    Emily De León MD    PCP Phone Number:    [phone]     Follow Up Details: 1 week        Discharge Follow-up with Specified  Provider: UrologyDr. Galindo in 2-4 weeks to discuss possible cystoscopy   As directed      To: UrologyDr. Galindo in 2-4 weeks to discuss possible cystoscopy                      Rupali Edwards MD  10/01/24      Time Spent on Discharge:  I spent  25  minutes on this discharge activity which included: face-to-face encounter with the patient, reviewing the data in the system, coordination of the care with the nursing staff as well as consultants, documentation, and entering orders.

## 2024-10-01 NOTE — NURSING NOTE
Patient up in chair, alert to self only, on 1 Liter Nasal Cannula    IV/Tele d/c and box returned to assigned location by RN    Discharge paperwork provided and placed in transfer packet to deliver by EMS to the [facility]    Report called to Margret Ruth RN    [facility] to be met by wheelchair transport van. Patient departed room 324 at 1600    RN called daughter at 1500 to notify of skin tear that occurred overnight.    Patient belongings placed in belonging bag and given to transport tech.

## 2024-10-01 NOTE — THERAPY TREATMENT NOTE
Acute Care - Speech Language Pathology   Swallow Treatment Note [facility]     Patient Name: Beckie Romeo  : 1945  MRN: [id number]  Today's Date: 10/1/2024               Admit Date: 2024    Visit Dx:     ICD-10-CM ICD-9-CM   1. Sepsis without acute organ dysfunction, due to unspecified organism  A41.9 038.9     995.91   2. Acute UTI (urinary tract infection)  N39.0 599.0   3. Chronic obstructive pulmonary disease, unspecified COPD type  J44.9 496   4. Dysphagia, unspecified type  R13.10 787.20     Patient Active Problem List   Diagnosis    COPD (chronic obstructive pulmonary disease)    Hypocalcemia    Anemia    Hypothyroidism    Essential hypertension    GERD without esophagitis    Panic attack    Hematuria    Anxiety disorder due to known physiological condition    Sepsis    COVID-19 virus infection    Metabolic encephalopathy     Past Medical History:   Diagnosis Date    Age-related physical debility     Allergic rhinitis     Anemia     Atherosclerotic heart disease of native coronary artery without angina pectoris     Cognitive communication deficit     Contact with and (suspected) exposure to covid-19     COPD (chronic obstructive pulmonary disease)     Cough     Depression     Diarrhea, unspecified     Disease of thyroid gland     Flatulence     GERD (gastroesophageal reflux disease)     Hyperlipidemia     Hypertension     Insomnia     Major depressive disorder with single episode     Nausea with vomiting     Other specified anxiety disorders     Paroxysmal A-fib     Polyneuropathy     Sciatica of left side     Sciatica of right side     Unspecified protein-calorie malnutrition      History reviewed. No pertinent surgical history.    SLP Recommendation and Plan     SLP Diet Recommendation: mechanical ground textures, thin liquids (10/01/24 0903)  Recommended Precautions and Strategies: upright posture during/after eating, small bites of food and sips of liquid, check mouth frequently for  "oral residue/pocketing, general aspiration precautions, 1:1 supervision, assist with feeding (10/01/24 0903)  SLP Rec. for Method of Medication Administration: meds crushed, with puree (10/01/24 0903)     Monitor for Signs of Aspiration: yes, notify SLP if any concerns (10/01/24 0903)        Anticipated Discharge Disposition (SLP): skilled nursing facility (10/01/24 0903)     Therapy Frequency (Swallow): 5 days per week (10/01/24 0903)  Predicted Duration Therapy Intervention (Days): 1 week (10/01/24 0903)  Oral Care Recommendations: Toothbrush, Oral Care before breakfast, after meals and PRN (10/01/24 0903)        Daily Summary of Progress (SLP): progress toward functional goals is good (10/01/24 0903)               Treatment Assessment (SLP): continued, mild-moderate, oral dysphagia (10/01/24 0903)  Treatment Assessment Comments (SLP): Pt self fed thin via straw with anterior loss of bolus and when asked if she spit it out the pt stated \"my mouth doesn't work good.\" The pt drank thin liquids via cup with appropriate lip closure and no anterior loss or  overt s/sx aspiration. Pt accepted trials of puree and mechanical ground textures with adequate bolus manipulation and no oral residue. Pt deferred soft to chew trials at this time. (10/01/24 0903)  Plan for Continued Treatment (SLP): continue treatment per plan of care (10/01/24 0903)         Plan of Care Reviewed With: patient  Progress: improving      SWALLOW EVALUATION (Last 72 Hours)       SLP Adult Swallow Evaluation       Row Name 10/01/24 0903 09/28/24 1126                Rehab Evaluation    Document Type therapy note (daily note)  -MM evaluation  -KH       Subjective Information no complaints  -MM no complaints  -KH       Patient Observations alert;cooperative;agree to therapy  -MM alert;agree to therapy  -KH       Patient/Family/Caregiver Comments/Observations No family present  -MM none  -KH       Patient Effort good  -MM good  -KH       Comment Pt's O2 " at 82% upon SLP arrival with NC displaced. Pt allowed SLP to replace NC and O2 increased. RN aware.  -MM --       Symptoms Noted During/After Treatment none  -MM none  -KH       Oral Care teeth brushed - regular toothbrush  -MM --          General Information    Patient Profile Reviewed yes  -MM yes  -KH       Pertinent History Of Current Problem Please see initial evaluation  -MM Pt with unknown PMH. Pt is a resident at [facility]. Pt adm with AMS, sepsis secondary to UTI, and Covid.  -       Current Method of Nutrition pureed;thin liquids  -MM NPO  -       Precautions/Limitations, Vision -- WFL;for purposes of eval  -KH       Precautions/Limitations, Hearing -- WFL;for purposes of eval  -       Prior Level of Function-Communication -- unknown  -       Prior Level of Function-Swallowing -- unknown  -       Plans/Goals Discussed with -- patient;agreed upon  -       Barriers to Rehab -- previous functional deficit;cognitive status  -          Pain    Additional Documentation -- Pain Scale: FACES Pre/Post-Treatment (Group)  -          Pain Scale: Numbers Pre/Post-Treatment    Pretreatment Pain Rating 0/10 - no pain  -MM --       Posttreatment Pain Rating 0/10 - no pain  -MM --          Pain Scale: FACES Pre/Post-Treatment    Pain: FACES Scale, Pretreatment -- 0-->no hurt  -KH       Posttreatment Pain Rating -- 0-->no hurt  -KH          Oral Motor Structure and Function    Dentition Assessment -- poor oral hygiene;missing teeth;teeth are in poor condition  -       Secretion Management -- problems swallowing secretions  -       Mucosal Quality -- dry  -KH       Volitional Swallow -- WFL  -       Volitional Cough -- WFL  -          Oral Musculature and Cranial Nerve Assessment    Oral Motor General Assessment -- generalized oral motor weakness  -          General Eating/Swallowing Observations    Respiratory Support Currently in Use -- nasal cannula  -       O2 Liters -- 2L  -KH        Eating/Swallowing Skills -- fed by SLP  -       Positioning During Eating -- upright 90 degree;upright in bed  -       Utensils Used -- spoon;cup;straw  -       Consistencies Trialed -- ice chips;thin liquids;pureed;soft to chew textures  -          Respiratory    Respiratory Status -- WFL  -          Clinical Swallow Eval    Oral Prep Phase -- impaired  -       Oral Transit -- impaired  -       Oral Residue -- impaired  -       Pharyngeal Phase -- no overt signs/symptoms of pharyngeal impairment  -       Esophageal Phase -- unremarkable  -          Oral Prep Concerns    Oral Prep Concerns -- prolonged mastication;inefficient mastication;bolus removed from mouth manually  -       Prolonged Mastication -- other (see comments)  soft solid  -       Inefficient Mastication -- other (see comments)  soft solid  -       Bolus Removed from Mouth Manually -- other (see comments)  soft solid  -          Oral Transit Concerns    Oral Transit Concerns -- increased oral transit time  -       Increased Oral Transit Time -- other (see comments)  soft solid  -          Oral Residue Concerns    Oral Residue Concerns -- lateral sulcus residue, left  -       Lateral Sulcus Residue, Left -- other (see comments)  soft solid; unable to be cleared with liquid wash  -          Swallowing Quality of Life Assessment    Education and counseling provided -- Signs of aspiration;Risks of aspiration;Safest diet options;Oral care recommendations and rationale  -          SLP Evaluation Clinical Impression    SLP Swallowing Diagnosis -- mod-severe;oral dysphagia;functional pharyngeal phase  -       Functional Impact -- risk of aspiration/pneumonia;risk of malnutrition;risk of dehydration  -       Rehab Potential/Prognosis, Swallowing -- good, to achieve stated therapy goals  -       Swallow Criteria for Skilled Therapeutic Interventions Met -- demonstrates skilled criteria  -          SLP Treatment  "Clinical Impressions    Treatment Assessment (SLP) continued;mild-moderate;oral dysphagia  -MM --       Treatment Assessment Comments (SLP) Pt self fed thin via straw with anterior loss of bolus and when asked if she spit it out the pt stated \"my mouth doesn't work good.\" The pt drank thin liquids via cup with appropriate lip closure and no anterior loss or  overt s/sx aspiration. Pt accepted trials of puree and mechanical ground textures with adequate bolus manipulation and no oral residue. Pt deferred soft to chew trials at this time.  -MM --       Daily Summary of Progress (SLP) progress toward functional goals is good  -MM --       Plan for Continued Treatment (SLP) continue treatment per plan of care  -MM --       Care Plan Review care plan/treatment goals reviewed;risks/benefits reviewed;current/potential barriers reviewed;patient/other agree to care plan  -MM --          Recommendations    Therapy Frequency (Swallow) 5 days per week  -MM 5 days per week  -KH       Predicted Duration Therapy Intervention (Days) 1 week  -MM 1 week  -KH       SLP Diet Recommendation mechanical ground textures;thin liquids  -MM puree;thin liquids  -KH       Recommended Precautions and Strategies upright posture during/after eating;small bites of food and sips of liquid;check mouth frequently for oral residue/pocketing;general aspiration precautions;1:1 supervision;assist with feeding  -MM upright posture during/after eating;small bites of food and sips of liquid;check mouth frequently for oral residue/pocketing;general aspiration precautions;1:1 supervision;assist with feeding  -KH       Oral Care Recommendations Toothbrush;Oral Care before breakfast, after meals and PRN  -MM Toothbrush;Oral Care before breakfast, after meals and PRN  -KH       SLP Rec. for Method of Medication Administration meds crushed;with puree  -MM meds crushed;with puree  -KH       Monitor for Signs of Aspiration yes;notify SLP if any concerns  -MM " yes;notify SLP if any concerns  -KH       Anticipated Discharge Disposition (SLP) skilled nursing facility  -MM skilled nursing facility  -                 User Key  (r) = Recorded By, (t) = Taken By, (c) = Cosigned By      Initials Name Effective Dates    KH Gloria Will, MS CCC-SLP 01/15/24 -     MM Graham Salgadon, MS CCC-SLP 08/30/24 -                     EDUCATION  The patient has been educated in the following areas:   Dysphagia (Swallowing Impairment).        SLP GOALS       Row Name 10/01/24 0903 09/28/24 1126          (LTG) Patient will demonstrate functional swallow for    Diet Texture (Demonstrate functional swallow) mechanical ground textures  -MM mechanical ground textures  -KH     Liquid viscosity (Demonstrate functional swallow) thin liquids  -MM thin liquids  -KH     Phillips (Demonstrate functional swallow) independently (over 90% accuracy)  -MM independently (over 90% accuracy)  -KH     Time Frame (Demonstrate functional swallow) 1 week  -MM 1 week  -KH     Progress/Outcomes (Demonstrate functional swallow) good progress toward goal  -MM --     Comment (Demonstrate functional swallow) Pt tolerated trials of mechanical ground texture with adequate mastication and bolus manipulation. Diet upgraded to mechanical ground.  -MM --        (STG) Patient will tolerate trials of    Consistencies Trialed (Tolerate trials) thin liquids;mechanical ground textures  -MM pureed textures;thin liquids  -KH     Desired Outcome (Tolerate trials) without signs/symptoms of aspiration;without signs of distress;with adequate oral prep/transit/clearance  -MM without signs/symptoms of aspiration;without signs of distress;with adequate oral prep/transit/clearance  -KH     Phillips (Tolerate trials) independently (over 90% accuracy)  -MM independently (over 90% accuracy)  -KH     Time Frame (Tolerate trials) 1 week  -MM 1 week  -KH     Progress/Outcomes (Tolerate trials) goal revised this date  -MM --     Comment  (Tolerate trials) Previous goal of puree and thin liquids was met as pt has been tolerating diet without difficulty. Pt's diet and goal upgraded to mechanical ground texture on this date.  -MM --               User Key  (r) = Recorded By, (t) = Taken By, (c) = Cosigned By      Initials Name Provider Type    Gloria Doyle MS CCC-SLP Speech and Language Pathologist    Kendra Magdaleno MS CCC-SLP Speech and Language Pathologist                         Time Calculation:    Time Calculation- SLP       Row Name 10/01/24 1021             Time Calculation- SLP    SLP Start Time 0903  -MM      SLP Received On 10/01/24  -MM         Untimed Charges    SLP Treatment ST Treatment Swallow Minutes  - 16259  -MM      [id number]-MT Treatment Swallow Minutes 57  -MM         Total Minutes    Untimed Charges Total Minutes 57  -MM       Total Minutes 57  -MM                User Key  (r) = Recorded By, (t) = Taken By, (c) = Cosigned By      Initials Name Provider Type    Kendra Magdaleno MS CCC-SLP Speech and Language Pathologist                    Therapy Charges for Today       Code Description Service Date Service Provider Modifiers Qty    [id number]  ST TREATMENT SWALLOW 4 10/1/2024 Kendra Salgado MS CCC-SLP GN 1                 Kendra Salgado MS CCC-TUCKER  10/1/2024

## 2024-10-01 NOTE — PLAN OF CARE
Goal Outcome Evaluation:  Plan of Care Reviewed With: patient           Outcome Evaluation: PT eval complete. Pt presents with generalized weakness, cognitive deficits, balance impairments, poor safety awareness, and decreased functional endurance warranting skilled IP PT. Pt amb 15' (w/ B UE HHA)+ 12' (w/ FWW), MinAx2. PT rec SNF at d/c, anticipate return to LTC.      Anticipated Discharge Disposition (PT): skilled nursing facility

## 2024-10-01 NOTE — THERAPY EVALUATION
Patient Name: Beckie Romeo  : 1945    MRN: [id number]                              Today's Date: 10/1/2024       Admit Date: 2024    Visit Dx:     ICD-10-CM ICD-9-CM   1. Sepsis without acute organ dysfunction, due to unspecified organism  A41.9 038.9     995.91   2. Acute UTI (urinary tract infection)  N39.0 599.0   3. Chronic obstructive pulmonary disease, unspecified COPD type  J44.9 496   4. Dysphagia, unspecified type  R13.10 787.20     Patient Active Problem List   Diagnosis    COPD (chronic obstructive pulmonary disease)    Hypocalcemia    Anemia    Hypothyroidism    Essential hypertension    GERD without esophagitis    Panic attack    Hematuria    Anxiety disorder due to known physiological condition    Sepsis    COVID-19 virus infection    Metabolic encephalopathy     Past Medical History:   Diagnosis Date    Age-related physical debility     Allergic rhinitis     Anemia     Atherosclerotic heart disease of native coronary artery without angina pectoris     Cognitive communication deficit     Contact with and (suspected) exposure to covid-19     COPD (chronic obstructive pulmonary disease)     Cough     Depression     Diarrhea, unspecified     Disease of thyroid gland     Flatulence     GERD (gastroesophageal reflux disease)     Hyperlipidemia     Hypertension     Insomnia     Major depressive disorder with single episode     Nausea with vomiting     Other specified anxiety disorders     Paroxysmal A-fib     Polyneuropathy     Sciatica of left side     Sciatica of right side     Unspecified protein-calorie malnutrition      History reviewed. No pertinent surgical history.   General Information       Row Name 10/01/24 1126          Physical Therapy Time and Intention    Document Type evaluation  -KE     Mode of Treatment physical therapy  -KE       Row Name 10/01/24 1126          General Information    Patient Profile Reviewed yes  -KE     Prior Level of Function dependent:;ADL's  pt  limited historian; per chart review pt is resident at [facility] and self propels w/c at baseline.  -KE     Existing Precautions/Restrictions fall;oxygen therapy device and L/min;other (see comments)  COVID, confusion  -KE     Barriers to Rehab medically complex;cognitive status;previous functional deficit  -KE       Row Name 10/01/24 1126          Living Environment    People in Home facility resident  per chart review: [facility]  -KE       Row Name 10/01/24 1126          Home Main Entrance    Number of Stairs, Main Entrance none  -KE       Row Name 10/01/24 1126          Stairs Within Home, Primary    Number of Stairs, Within Home, Primary none  -KE       Row Name 10/01/24 1126          Cognition    Orientation Status (Cognition) oriented to;person;disoriented to;place;situation;time  -KE       Row Name 10/01/24 1126          Safety Issues, Functional Mobility    Safety Issues Affecting Function (Mobility) ability to follow commands;awareness of need for assistance;insight into deficits/self-awareness;positioning of assistive device;judgment;problem-solving;safety precaution awareness;safety precautions follow-through/compliance;sequencing abilities  -KE     Impairments Affecting Function (Mobility) balance;cognition;endurance/activity tolerance;postural/trunk control;strength  -KE     Cognitive Impairments, Mobility Safety/Performance attention;awareness, need for assistance;impulsivity;insight into deficits/self-awareness;safety precaution awareness;safety precaution follow-through;sequencing abilities  -KE     Comment, Safety Issues/Impairments (Mobility) poor safety awareness, Follows commands however requires frequent redirect for attention to task  -KE               User Key  (r) = Recorded By, (t) = Taken By, (c) = Cosigned By      Initials Name Provider Type    Ruth Guzman, PT Physical Therapist                   Mobility       Row Name 10/01/24 1132          Bed Mobility    Bed Mobility supine-sit  -KE      Supine-Sit [place] (Bed Mobility) moderate assist (50% patient effort);1 person assist  -KE     Assistive Device (Bed Mobility) head of bed elevated;bed rails  -KE     Comment, (Bed Mobility) VCs for sequencing, required increased time and effort to complete.  -KE       Row Name 10/01/24 1135          Sit-Stand Transfer    Sit-Stand [place] (Transfers) minimum assist (75% patient effort);1 person assist  -KE     Assistive Device (Sit-Stand Transfers) other (see comments);walker, front-wheeled  B HHA  -KE     Comment, (Sit-Stand Transfer) pt initially declining use of AD, initially standing x1 from EOB w/ HHA, x1 STS from toilet w/ FWW  -KE       Row Name 10/01/24 1132          Gait/Stairs (Locomotion)    [place] Level (Gait) minimum assist (75% patient effort);2 person assist  -KE     Assistive Device (Gait) walker, front-wheeled;other (see comments)  VS HHA  -KE     Patient was able to Ambulate yes  -KE     Distance in Feet (Gait) 16  +12  -KE     Deviations/Abnormal Patterns (Gait) bilateral deviations;gait speed decreased;stride length decreased;base of support, narrow  -KE     Bilateral Gait Deviations forward flexed posture  -KE     Comment, (Gait/Stairs) Pt demo short shuffled steps with narrow AMNA, fwd flexed posture, decreased heel strike, and slowed gait speed. Pt initially declining use of FWW despite education and encouragement for improved safety. Pt initially using B UE support with first bout of mobility, secondary bout pt using FWW. Further mobility limited by fatigue. SpO2 noted to be 94% following first bout of mobility on 2L O2. SpO2 noted to be 83% with poor pleth following second bout, RN present and replacing sensor at end of session.  -KE               User Key  (r) = Recorded By, (t) = Taken By, (c) = Cosigned By      Initials Name Provider Type    Ruth Guzman PT Physical Therapist                   Obj/Interventions       Row Name 10/01/24 0940          Range of  Motion Comprehensive    General Range of Motion bilateral lower extremity ROM WFL  -KE       Row Name 10/01/24 1138          Strength Comprehensive (MMT)    General Manual Muscle Testing (MMT) Assessment lower extremity strength deficits identified  -     Comment, General Manual Muscle Testing (MMT) Assessment B LEs grossly 4-/5  -KE       Row Name 10/01/24 1138          Balance    Balance Assessment sitting static balance;sitting dynamic balance;sit to stand dynamic balance;standing static balance;standing dynamic balance  -     Static Sitting Balance contact guard  -KE     Dynamic Sitting Balance contact guard  -KE     Position, Sitting Balance sitting edge of bed  -KE     Sit to Stand Dynamic Balance minimal assist;1-person assist  -KE     Static Standing Balance minimal assist;1-person assist  -KE     Dynamic Standing Balance minimal assist;2-person assist  -KE     Position/Device Used, Standing Balance unsupported;walker, front-wheeled  vs B UE support  -     Balance Interventions sitting;standing;sit to stand;supported;dynamic;static  -KE     Comment, Balance unsteady in w/ stance, declines use of AD despite encouragement  -       Row Name 10/01/24 1138          Sensory Assessment (Somatosensory)    Sensory Assessment (Somatosensory) LE sensation intact  -               User Key  (r) = Recorded By, (t) = Taken By, (c) = Cosigned By      Initials Name Provider Type    Ruth Guzman, PT Physical Therapist                   Goals/Plan       Row Name 10/01/24 1149          Bed Mobility Goal 1 (PT)    Activity/Assistive Device (Bed Mobility Goal 1, PT) sit to supine/supine to sit  -KE     [place]ys Level/Cues Needed (Bed Mobility Goal 1, PT) contact guard required  -     Time Frame (Bed Mobility Goal 1, PT) short term goal (STG);5 days  -     Progress/Outcomes (Bed Mobility Goal 1, PT) new goal  -       Row Name 10/01/24 1149          Transfer Goal 1 (PT)    Activity/Assistive Device  (Transfer Goal 1, PT) sit-to-stand/stand-to-sit;bed-to-chair/chair-to-bed  -KE     [place] Level/Cues Needed (Transfer Goal 1, PT) contact guard required  -KE     Time Frame (Transfer Goal 1, PT) long term goal (LTG);10 days  -KE     Progress/Outcome (Transfer Goal 1, PT) new goal  -KE       Row Name 10/01/24 1149          Gait Training Goal 1 (PT)    Activity/Assistive Device (Gait Training Goal 1, PT) gait (walking locomotion);assistive device use;improve balance and speed;increase endurance/gait distance;decrease fall risk  -KE     [place] Level (Gait Training Goal 1, PT) standby assist  -KE     Distance (Gait Training Goal 1, PT) 100  -KE     Time Frame (Gait Training Goal 1, PT) long term goal (LTG);10 days  -KE     Progress/Outcome (Gait Training Goal 1, PT) new goal  -KE       Row Name 10/01/24 1149          Therapy Assessment/Plan (PT)    Planned Therapy Interventions (PT) balance training;bed mobility training;gait training;home exercise program;neuromuscular re-education;orthotic fitting/training;postural re-education;transfer training;stretching;strengthening;stair training;ROM (range of motion);wheelchair management/propulsion training  -KE               User Key  (r) = Recorded By, (t) = Taken By, (c) = Cosigned By      Initials Name Provider Type    Ruth Guzman, PT Physical Therapist                   Clinical Impression       Row Name 10/01/24 1142          Pain    Pain Intervention(s) Ambulation/increased activity;Repositioned  -       Row Name 10/01/24 1144          Pain Scale: FACES Pre/Post-Treatment    Pain: FACES Scale, Pretreatment 2-->hurts little bit  -KE     Posttreatment Pain Rating 2-->hurts little bit  -KE       Row Name 10/01/24 1148          Plan of Care Review    Plan of Care Reviewed With patient  -KE     Outcome Evaluation PT arlenal complete. Pt presents with generalized weakness, cognitive deficits, balance impairments, poor safety awareness, and decreased  functional endurance warranting skilled IP PT. Pt amb 15' (w/ B UE HHA)+ 12' (w/ FWW), MinAx2. PT rec SNF at d/c, anticipate return to LTC.  -TANA       Row Name 10/01/24 1142          Therapy Assessment/Plan (PT)    Patient/Family Therapy Goals Statement (PT) does not state  -KE     Rehab Potential (PT) fair, will monitor progress closely  -KE     Criteria for Skilled Interventions Met (PT) yes;meets criteria;skilled treatment is necessary  -KE     Therapy Frequency (PT) daily  -KE     Predicted Duration of Therapy Intervention (PT) 10 days  -KE       Row Name 10/01/24 1142          Vital Signs    Pre Systolic BP Rehab 134  -KE     Pre Treatment Diastolic BP 68  -KE     Post Systolic BP Rehab 135  -KE     Post Treatment Diastolic BP 70  -KE     Intratreatment Heart Rate (beats/min) 126  -KE     Posttreatment Heart Rate (beats/min) 106  -KE     Pre SpO2 (%) 96  -KE     O2 Delivery Pre Treatment supplemental O2  -KE     Intra SpO2 (%) 83   -KE     O2 Delivery Intra Treatment supplemental O2  -KE     Post SpO2 (%) 87  RN present and replacing sensor  -KE     O2 Delivery Post Treatment supplemental O2  -KE     Pre Patient Position Supine  -KE     Intra Patient Position Standing  -KE     Post Patient Position Sitting  -KE       Row Name 10/01/24 1142          Positioning and Restraints    Pre-Treatment Position in bed  -KE     Post Treatment Position chair  -KE     In Chair notified nsg;reclined;call light within reach;encouraged to call for assist;exit alarm on;waffle cushion;legs elevated;with nsg  -KE               User Key  (r) = Recorded By, (t) = Taken By, (c) = Cosigned By      Initials Name Provider Type    Ruth Guzman, PT Physical Therapist                   Outcome Measures       Row Name 10/01/24 1151          How much help from another person do you currently need...    Turning from your back to your side while in flat bed without using bedrails? 3  -KE     Moving from lying on back to sitting on the  side of a flat bed without bedrails? 3  -KE     Moving to and from a bed to a chair (including a wheelchair)? 3  -KE     Standing up from a chair using your arms (e.g., wheelchair, bedside chair)? 3  -KE     Climbing 3-5 steps with a railing? 2  -KE     To walk in hospital room? 3  -KE     AM-PAC 6 Clicks Score (PT) 17  -KE     Highest Level of Mobility Goal 5 --> Static standing  -KE       Row Name 10/01/24 1151          Functional Assessment    Outcome Measure Options AM-PAC 6 Clicks Basic Mobility (PT)  -KE               User Key  (r) = Recorded By, (t) = Taken By, (c) = Cosigned By      Initials Name Provider Type    Ruth Guzman, PT Physical Therapist                                 Physical Therapy Education       Title: PT OT SLP Therapies (In Progress)       Topic: Physical Therapy (In Progress)       Point: Mobility training (In Progress)       Learning Progress Summary             Patient Acceptance, E, NR by TANA at 10/1/2024 1151    Acceptance, TB,E,D, NR,NL by AW at 9/30/2024 2346    Acceptance, E,TB, VU by AW at 9/29/2024 2311                         Point: Home exercise program (In Progress)       Learning Progress Summary             Patient Acceptance, E, NR by KE at 10/1/2024 1151    Acceptance, TB,E,D, NR,NL by AW at 9/30/2024 2346    Acceptance, E,TB, VU by AW at 9/29/2024 2311                         Point: Body mechanics (In Progress)       Learning Progress Summary             Patient Acceptance, E, NR by KE at 10/1/2024 1151    Acceptance, TB,E,D, NR,NL by AW at 9/30/2024 2346    Acceptance, E,TB, VU by AW at 9/29/2024 2311                         Point: Precautions (In Progress)       Learning Progress Summary             Patient Acceptance, E, NR by KE at 10/1/2024 1151    Acceptance, TB,E,D, NR,NL by AW at 9/30/2024 2346    Acceptance, E,TB, VU by AW at 9/29/2024 2311                                         User Key       Initials Effective Dates Name Provider Type [facility]    AW  09/19/24 -  [place], April NGUYEN RN Registered Nurse Nurse     11/16/23 -  Ruth Yang, PT Physical Therapist PT                  PT Recommendation and Plan  Planned Therapy Interventions (PT): balance training, bed mobility training, gait training, home exercise program, neuromuscular re-education, orthotic fitting/training, postural re-education, transfer training, stretching, strengthening, stair training, ROM (range of motion), wheelchair management/propulsion training  Plan of Care Reviewed With: patient  Outcome Evaluation: PT eval complete. Pt presents with generalized weakness, cognitive deficits, balance impairments, poor safety awareness, and decreased functional endurance warranting skilled IP PT. Pt amb 15' (w/ B UE HHA)+ 12' (w/ FWW), MinAx2. PT rec SNF at d/c, anticipate return to LTC.     Time Calculation:   PT Evaluation Complexity  History, PT Evaluation Complexity: 3 or more personal factors and/or comorbidities  Examination of Body Systems (PT Eval Complexity): total of 3 or more elements  Clinical Presentation (PT Evaluation Complexity): evolving  Clinical Decision Making (PT Evaluation Complexity): moderate complexity  Overall Complexity (PT Evaluation Complexity): moderate complexity     PT Charges       Row Name 10/01/24 1152             Time Calculation    Start Time 1041  -KE      PT Received On 10/01/24  -KE      PT Goal Re-Cert Due Date 10/11/24  -KE         Timed Charges    87123 - PT Therapeutic Activity Minutes 12  -KE         Untimed Charges    PT Eval/Re-eval Minutes 49  -KE         Total Minutes    Timed Charges Total Minutes 12  -KE      Untimed Charges Total Minutes 49  -KE       Total Minutes 61  -KE                User Key  (r) = Recorded By, (t) = Taken By, (c) = Cosigned By      Initials Name Provider Type    Ruth Guzman, PT Physical Therapist                  Therapy Charges for Today       Code Description Service Date Service Provider Modifiers Qty     68117402750 HC PT THERAPEUTIC ACT EA 15 MIN 10/1/2024 Ruth Yang, PT GP 1    57081234484 HC PT EVAL MOD COMPLEXITY 4 10/1/2024 Ruth Yang, PT GP 1            PT G-Codes  Outcome Measure Options: AM-PAC 6 Clicks Basic Mobility (PT)  AM-PAC 6 Clicks Score (PT): 17  AM-PAC 6 Clicks Score (OT): 13  PT Discharge Summary  Anticipated Discharge Disposition (PT): skilled nursing facility    Ruth Yang PT  10/1/2024

## 2024-10-01 NOTE — PLAN OF CARE
Problem: Skin Injury Risk Increased  Goal: Skin Health and Integrity  10/1/2024 0502 by Mary Lou Chaidez RN  Outcome: Progressing  9/30/2024 2347 by Mary Lou Chaidez RN  Outcome: Progressing  Intervention: Optimize Skin Protection  Recent Flowsheet Documentation  Taken 10/1/2024 0000 by Mary Lou Chaidez RN  Pressure Reduction Techniques:   frequent weight shift encouraged   positioned off wounds   pressure points protected   weight shift assistance provided  Pressure Reduction Devices: positioning supports utilized  Skin Protection:   hydrocolloids used   transparent dressing maintained   tubing/devices free from skin contact  Taken 9/30/2024 2000 by Mary Lou Chaidez RN  Pressure Reduction Techniques:   frequent weight shift encouraged   weight shift assistance provided  Head of Bed (HOB) Positioning: HOB elevated  Pressure Reduction Devices: positioning supports utilized  Skin Protection:   hydrocolloids used   skin-to-device areas padded   skin-to-skin areas padded   transparent dressing maintained   tubing/devices free from skin contact   skin sealant/moisture barrier applied     Problem: Fall Injury Risk  Goal: Absence of Fall and Fall-Related Injury  10/1/2024 0502 by Mary Lou Chaidez RN  Outcome: Progressing  9/30/2024 2347 by Mary Lou Chaidez RN  Outcome: Progressing  Intervention: Identify and Manage Contributors  Recent Flowsheet Documentation  Taken 10/1/2024 0400 by Mary Lou Chaidez, RN  Medication Review/Management: medications reviewed  Taken 10/1/2024 0200 by Mary Lou Chaidez RN  Medication Review/Management: medications reviewed  Taken 10/1/2024 0000 by Mary Lou Chaidez RN  Medication Review/Management: medications reviewed  Taken 9/30/2024 2200 by Mary Lou Chaidez, RN  Medication Review/Management: medications reviewed  Taken 9/30/2024 2000 by Mary Lou Chaidez RN  Medication Review/Management: medications reviewed  Self-Care Promotion:   independence  encouraged   BADL personal objects within reach   BADL personal routines maintained   meal set-up provided  Intervention: Promote Injury-Free Environment  Recent Flowsheet Documentation  Taken 10/1/2024 0400 by Mary Lou Chaidez RN  Safety Promotion/Fall Prevention:   clutter free environment maintained   nonskid shoes/slippers when out of bed   safety round/check completed  Taken 10/1/2024 0200 by Mary Lou Chaidez RN  Safety Promotion/Fall Prevention:   clutter free environment maintained   fall prevention program maintained   room organization consistent   safety round/check completed  Taken 10/1/2024 0000 by Mary Lou Chaidez RN  Safety Promotion/Fall Prevention:   activity supervised   assistive device/personal items within reach   clutter free environment maintained   safety round/check completed   room organization consistent  Taken 9/30/2024 2200 by Mary Lou Chaidez RN  Safety Promotion/Fall Prevention:   clutter free environment maintained   nonskid shoes/slippers when out of bed   room organization consistent   safety round/check completed  Taken 9/30/2024 2000 by Mary Lou Chaidez RN  Safety Promotion/Fall Prevention:   clutter free environment maintained   nonskid shoes/slippers when out of bed   safety round/check completed   room organization consistent     Problem: Adjustment to Illness (Sepsis/Septic Shock)  Goal: Optimal Coping  10/1/2024 0502 by Mary Lou Chaidez, RN  Outcome: Progressing  9/30/2024 2347 by Mary Lou Chaidez RN  Outcome: Progressing  Intervention: Optimize Psychosocial Adjustment to Illness  Recent Flowsheet Documentation  Taken 9/30/2024 2000 by Mary Lou Chaidez, RN  Supportive Measures:   active listening utilized   relaxation techniques promoted  Family/Support System Care: support provided     Problem: Bleeding (Sepsis/Septic Shock)  Goal: Absence of Bleeding  10/1/2024 0502 by Mary Lou Chaidez, RN  Outcome: Progressing  9/30/2024 2347 by [place],  Mary Lou CEDILLO RN  Outcome: Progressing     Problem: Glycemic Control Impaired (Sepsis/Septic Shock)  Goal: Blood Glucose Level Within Desired Range  10/1/2024 0502 by Mary Lou Chaidez RN  Outcome: Progressing  9/30/2024 2347 by Mary Lou Chaidez RN  Outcome: Progressing     Problem: Infection Progression (Sepsis/Septic Shock)  Goal: Absence of Infection Signs and Symptoms  10/1/2024 0502 by Mary Lou Chaidez RN  Outcome: Progressing  9/30/2024 2347 by Mary Lou Chaidez RN  Outcome: Progressing  Intervention: Initiate Sepsis Management  Recent Flowsheet Documentation  Taken 10/1/2024 0400 by Mary Lou Chaidez RN  Isolation Precautions:   airborne   contact  Taken 10/1/2024 0200 by Mary Lou Chaidez RN  Isolation Precautions:   airborne   contact  Taken 9/30/2024 2000 by Mary Lou Chaidez RN  Infection Prevention:   rest/sleep promoted   single patient room provided  Isolation Precautions:   airborne   contact  Intervention: Promote Recovery  Recent Flowsheet Documentation  Taken 9/30/2024 2200 by Mary Lou Chaidez RN  Sleep/Rest Enhancement:   therapeutic touch utilized   relaxation techniques promoted  Taken 9/30/2024 2000 by Mary Lou Chaidez RN  Activity Management: activity encouraged  Airway/Ventilation Support: pulmonary hygiene promoted  Sleep/Rest Enhancement:   relaxation techniques promoted   therapeutic touch utilized     Problem: Nutrition Impaired (Sepsis/Septic Shock)  Goal: Optimal Nutrition Intake  10/1/2024 0502 by Mary Lou Chaidez RN  Outcome: Progressing  9/30/2024 2347 by Mary Lou Chaidez RN  Outcome: Progressing     Problem: Pain Acute  Goal: Acceptable Pain Control and Functional Ability  10/1/2024 0502 by Mary Lou Chaidez RN  Outcome: Progressing  9/30/2024 2347 by Mary Lou Chaidez RN  Outcome: Progressing  Intervention: Prevent or Manage Pain  Recent Flowsheet Documentation  Taken 10/1/2024 0400 by Mary Lou Chaidez RN  Medication Review/Management:  medications reviewed  Taken 10/1/2024 0200 by Mary Lou Chaidez RN  Medication Review/Management: medications reviewed  Taken 10/1/2024 0000 by Mary Lou Chaidez RN  Bowel Elimination Promotion: adequate fluid intake promoted  Medication Review/Management: medications reviewed  Taken 9/30/2024 2200 by Mary Lou Chaidez, RN  Sleep/Rest Enhancement:   therapeutic touch utilized   relaxation techniques promoted  Medication Review/Management: medications reviewed  Taken 9/30/2024 2000 by Mary Lou Chaidez, RN  Sensory Stimulation Regulation:   care clustered   television on  Bowel Elimination Promotion: adequate fluid intake promoted  Sleep/Rest Enhancement:   relaxation techniques promoted   therapeutic touch utilized  Medication Review/Management: medications reviewed  Intervention: Develop Pain Management Plan  Recent Flowsheet Documentation  Taken 9/30/2024 2200 by Mary Lou Chaidez RN  Pain Management Interventions:   care clustered   pillow support provided   position adjusted   see MAR  Taken 9/30/2024 2000 by Mary Lou Chaidez RN  Pain Management Interventions: care clustered  Intervention: Optimize Psychosocial Wellbeing  Recent Flowsheet Documentation  Taken 9/30/2024 2000 by Mary Lou Chaidez, RN  Supportive Measures:   active listening utilized   relaxation techniques promoted  Diversional Activities: television     Problem: Infection  Goal: Absence of Infection Signs and Symptoms  10/1/2024 0502 by Mary Lou Chaidez, RN  Outcome: Progressing  9/30/2024 2347 by Mary Lou Chaidez RN  Outcome: Progressing  Intervention: Prevent or Manage Infection  Recent Flowsheet Documentation  Taken 10/1/2024 0400 by Mary Lou Chaidez, RN  Isolation Precautions:   airborne   contact  Taken 10/1/2024 0200 by Mary Lou Chaidez, RN  Isolation Precautions:   airborne   contact  Taken 9/30/2024 2000 by Mary Lou Chaidez, RN  Isolation Precautions:   airborne   contact     Problem: Hypertension  Comorbidity  Goal: Blood Pressure in Desired Range  10/1/2024 0502 by Mary Lou Chaidez RN  Outcome: Progressing  9/30/2024 2347 by Mary Lou Chaidez RN  Outcome: Progressing  Intervention: Maintain Blood Pressure Management  Recent Flowsheet Documentation  Taken 10/1/2024 0400 by Mary Lou Chaidez RN  Medication Review/Management: medications reviewed  Taken 10/1/2024 0200 by Mary Lou Chaidez RN  Medication Review/Management: medications reviewed  Taken 10/1/2024 0000 by Mary Lou Chaidez RN  Syncope Management: position changed slowly  Medication Review/Management: medications reviewed  Taken 9/30/2024 2200 by Mary Lou Chaidez RN  Syncope Management: position changed slowly  Medication Review/Management: medications reviewed  Taken 9/30/2024 2000 by Mary Lou Chaidez RN  Syncope Management: position changed slowly  Medication Review/Management: medications reviewed     Problem: Pain Chronic (Persistent) (Comorbidity Management)  Goal: Acceptable Pain Control and Functional Ability  10/1/2024 0502 by Mary Lou Chaidez RN  Outcome: Progressing  9/30/2024 2347 by Mary Lou Chaidez RN  Outcome: Progressing  Intervention: Manage Persistent Pain  Recent Flowsheet Documentation  Taken 10/1/2024 0400 by Mary Lou Chaidez RN  Medication Review/Management: medications reviewed  Taken 10/1/2024 0200 by Mary Lou Chaidez RN  Medication Review/Management: medications reviewed  Taken 10/1/2024 0000 by Mary Lou Chaidez RN  Bowel Elimination Promotion: adequate fluid intake promoted  Medication Review/Management: medications reviewed  Taken 9/30/2024 2200 by Mary Lou Chaidez, RN  Sleep/Rest Enhancement:   therapeutic touch utilized   relaxation techniques promoted  Medication Review/Management: medications reviewed  Taken 9/30/2024 2000 by Mary Lou Chaidez RN  Bowel Elimination Promotion: adequate fluid intake promoted  Sleep/Rest Enhancement:   relaxation techniques promoted    therapeutic touch utilized  Medication Review/Management: medications reviewed  Intervention: Develop Pain Management Plan  Recent Flowsheet Documentation  Taken 9/30/2024 2200 by Mary Lou Chaidez, RN  Pain Management Interventions:   care clustered   pillow support provided   position adjusted   see MAR  Taken 9/30/2024 2000 by Mary Lou Chaidez, RN  Pain Management Interventions: care clustered  Intervention: Optimize Psychosocial Wellbeing  Recent Flowsheet Documentation  Taken 9/30/2024 2000 by Mary Lou Chaidez, RN  Supportive Measures:   active listening utilized   relaxation techniques promoted  Diversional Activities: television  Family/Support System Care: support provided     Problem: Adult Inpatient Plan of Care  Goal: Plan of Care Review  10/1/2024 0502 by Mary Lou Chaidez, RN  Outcome: Progressing  Flowsheets (Taken 10/1/2024 0502)  Progress: no change  Plan of Care Reviewed With: patient  9/30/2024 2347 by Mary Lou Chaidez RN  Outcome: Progressing  Flowsheets (Taken 9/30/2024 2347)  Progress: no change  Plan of Care Reviewed With: patient  Goal: Patient-Specific Goal (Individualized)  10/1/2024 0502 by Mary Lou Chaidez RN  Outcome: Progressing  9/30/2024 2347 by Mary Lou Chaidez RN  Outcome: Progressing  Goal: Absence of Hospital-Acquired Illness or Injury  10/1/2024 0502 by Mary Lou Chaidez, RN  Outcome: Progressing  9/30/2024 2347 by Mary Lou Chaidez RN  Outcome: Progressing  Intervention: Identify and Manage Fall Risk  Recent Flowsheet Documentation  Taken 10/1/2024 0400 by Mary Lou Chaidez, RN  Safety Promotion/Fall Prevention:   clutter free environment maintained   nonskid shoes/slippers when out of bed   safety round/check completed  Taken 10/1/2024 0200 by Mary Lou Chaidez, RN  Safety Promotion/Fall Prevention:   clutter free environment maintained   fall prevention program maintained   room organization consistent   safety round/check completed  Taken  10/1/2024 0000 by Mary Lou Chaidez RN  Safety Promotion/Fall Prevention:   activity supervised   assistive device/personal items within reach   clutter free environment maintained   safety round/check completed   room organization consistent  Taken 9/30/2024 2200 by Mary Lou Chaidez RN  Safety Promotion/Fall Prevention:   clutter free environment maintained   nonskid shoes/slippers when out of bed   room organization consistent   safety round/check completed  Taken 9/30/2024 2000 by Mary Lou Chaidez RN  Safety Promotion/Fall Prevention:   clutter free environment maintained   nonskid shoes/slippers when out of bed   safety round/check completed   room organization consistent  Intervention: Prevent Skin Injury  Recent Flowsheet Documentation  Taken 10/1/2024 0000 by Mary Lou Chaidez RN  Body Position:   turned   left  Skin Protection:   hydrocolloids used   transparent dressing maintained   tubing/devices free from skin contact  Taken 9/30/2024 2000 by Mary Lou Chaidez RN  Body Position: left  Skin Protection:   hydrocolloids used   skin-to-device areas padded   skin-to-skin areas padded   transparent dressing maintained   tubing/devices free from skin contact   skin sealant/moisture barrier applied  Intervention: Prevent and Manage VTE (Venous Thromboembolism) Risk  Recent Flowsheet Documentation  Taken 9/30/2024 2000 by Mary Lou Chaidez, RN  Activity Management: activity encouraged  Range of Motion:   active ROM (range of motion) encouraged   ROM (range of motion) performed  Intervention: Prevent Infection  Recent Flowsheet Documentation  Taken 9/30/2024 2000 by Mary Lou Chaidez, DEEPIKA  Infection Prevention:   rest/sleep promoted   single patient room provided  Goal: Optimal Comfort and Wellbeing  10/1/2024 0502 by Mary Lou Chaidez, RN  Outcome: Progressing  9/30/2024 2347 by Mary Lou Chaidez RN  Outcome: Progressing  Intervention: Monitor Pain and Promote Comfort  Recent Flowsheet  Documentation  Taken 9/30/2024 2200 by Mary Lou Chaidez, RN  Pain Management Interventions:   care clustered   pillow support provided   position adjusted   see MAR  Taken 9/30/2024 2000 by Mary Lou Chaidez RN  Pain Management Interventions: care clustered  Intervention: Provide Person-Centered Care  Recent Flowsheet Documentation  Taken 9/30/2024 2100 by Mary Lou Chaidez, RN  Trust Relationship/Rapport: empathic listening provided  Taken 9/30/2024 2000 by Mary Lou Chaidez RN  Trust Relationship/Rapport:   emotional support provided   empathic listening provided   questions encouraged   questions answered   reassurance provided   thoughts/feelings acknowledged   care explained   choices provided  Goal: Readiness for Transition of Care  10/1/2024 0502 by Mary Lou Chaidez, RN  Outcome: Progressing  9/30/2024 2347 by Mary Lou Chaidez, RN  Outcome: Progressing   Goal Outcome Evaluation:  Plan of Care Reviewed With: patient        Progress: no change

## 2024-10-01 NOTE — CASE MANAGEMENT/SOCIAL WORK
Case Management Discharge Note    Final Note:  is being discharged to the [facility] today if medically ready. Confirmed with Cecilia with facility. Updated Ms. Romeo' Daughter, Jessica and she is agreeable with discharge plan. She will be transported by [facility] at 4:00pm. Please have her at the [address] [facility] 10 minutes early.Pharmacy used by facility is [facility]. Please call report to [phone]. No need to fax discharge summary.           Selected Continued Care - Admitted Since 9/27/2024       Destination Coordination complete.      Service Provider Selected Services Address Phone Fax Patient Preferred    [facility] [address] [place] [address] [phone] --                          Transportation Services  W/C Van:  ([facility])    Final Discharge Disposition Code: 03 - skilled nursing facility (SNF)

## 2024-10-01 NOTE — NURSING NOTE
"Reason for Wound, Ostomy and Continence (WOC) Nursing Consultation: \"right forearm skin tear\"    Patient appears confused but cooperative.  Family/support person not present.     Wounds noted by WOC:    Wound Assessment  Wound Type: skin tear  Location: right forearm  Measurements: 6x5cm overall  Wound Bed: bleeding, clean, and moist  Wound Edges: Irregular and Open  Periwound Skin: ecchymotic   Drainage Characteristics/Odor: bleeding controlled  Drainage Amount: scant  Pain: Yes when cleansing  Care provided: cleansed, hydrogel multilayer xeroform, rolled gauze  Notes: q3 dressing changes by nursing  Wound Image:         Recommendation(s) for management of wound:   -Refer to wound care orders for specific instructions on how to treat/manage wound.  -Practice pressure injury prevention protocol.    Most recent Adriel Scale score:  Sensory Perception: 3-->slightly limited  Moisture: 3-->occasionally moist  Activity: 2-->chairfast  Mobility: 2-->very limited  Nutrition: 2-->probably inadequate  Friction and Shear: 2-->potential problem  Adriel Score: 14 (09/30/24 2000)     Specialty support surface: Foam Mattress with Waffle Overlay       Pressure Injury Prevention Protocol (initiate for Adriel Score of 18 or less):   *Turn q 2 hr, keep heels elevated and offloaded with offloading heel boots.  *Allevn dressings to heels, sacrum/coccyx  *Follow C.A.R.E protocol if medical devices (Bipap, pehlan, Ng tube, etc) are being used.  *Reduce layers under patient (one sheet as drawsheet and two incontinence pads) to allow waffle or JADA to improve microclimate   *Raise knee-gatch before elevating HOB to reduce shearing      WOC Team will continue to follow.  Please re-consult if the wound(s) worsens.   "
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
## 2024-10-02 LAB
BACTERIA SPEC AEROBE CULT: NORMAL
BACTERIA SPEC AEROBE CULT: NORMAL